# Patient Record
Sex: MALE | Race: WHITE | NOT HISPANIC OR LATINO | Employment: OTHER | URBAN - METROPOLITAN AREA
[De-identification: names, ages, dates, MRNs, and addresses within clinical notes are randomized per-mention and may not be internally consistent; named-entity substitution may affect disease eponyms.]

---

## 2022-11-07 ENCOUNTER — APPOINTMENT (OUTPATIENT)
Dept: GENERAL RADIOLOGY | Facility: HOSPITAL | Age: 82
End: 2022-11-07

## 2022-11-07 ENCOUNTER — HOSPITAL ENCOUNTER (EMERGENCY)
Facility: HOSPITAL | Age: 82
Discharge: HOME | End: 2022-11-07
Attending: STUDENT IN AN ORGANIZED HEALTH CARE EDUCATION/TRAINING PROGRAM | Admitting: STUDENT IN AN ORGANIZED HEALTH CARE EDUCATION/TRAINING PROGRAM

## 2022-11-07 ENCOUNTER — APPOINTMENT (OUTPATIENT)
Dept: CT IMAGING | Facility: HOSPITAL | Age: 82
End: 2022-11-07

## 2022-11-07 VITALS
DIASTOLIC BLOOD PRESSURE: 73 MMHG | HEART RATE: 74 BPM | RESPIRATION RATE: 18 BRPM | WEIGHT: 192.02 LBS | SYSTOLIC BLOOD PRESSURE: 146 MMHG | BODY MASS INDEX: 27.49 KG/M2 | TEMPERATURE: 98 F | OXYGEN SATURATION: 96 % | HEIGHT: 70 IN

## 2022-11-07 DIAGNOSIS — S62.605A CLOSED NONDISPLACED FRACTURE OF PHALANX OF LEFT RING FINGER, UNSPECIFIED PHALANX, INITIAL ENCOUNTER: ICD-10-CM

## 2022-11-07 DIAGNOSIS — S09.90XA INJURY OF HEAD, INITIAL ENCOUNTER: ICD-10-CM

## 2022-11-07 DIAGNOSIS — S63.259A DISLOCATION OF FINGER, INITIAL ENCOUNTER: ICD-10-CM
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
## 2022-11-07 PROCEDURE — 0 LIDOCAINE 1 % SOLUTION: Performed by: STUDENT IN AN ORGANIZED HEALTH CARE EDUCATION/TRAINING PROGRAM

## 2022-11-07 PROCEDURE — 70450 CT HEAD/BRAIN W/O DYE: CPT

## 2022-11-07 PROCEDURE — 73140 X-RAY EXAM OF FINGER(S): CPT

## 2022-11-07 PROCEDURE — 99282 EMERGENCY DEPT VISIT SF MDM: CPT

## 2022-11-07 RX ORDER — LIDOCAINE HYDROCHLORIDE 10 MG/ML
5 INJECTION, SOLUTION INFILTRATION; PERINEURAL ONCE
Status: COMPLETED | OUTPATIENT
Start: 2022-11-07 | End: 2022-11-07

## 2022-11-07 RX ADMIN — LIDOCAINE HYDROCHLORIDE 5 ML: 10 INJECTION, SOLUTION INFILTRATION; PERINEURAL at 17:12

## 2022-11-07 NOTE — ED PROVIDER NOTES
"Time: 4:12 PM EST    Chief Complaint: fall, head injury  History provided by: pt  History is limited by: N/A     History of Present Illness:  Patient is a 81 y.o. year old male who presents to the emergency department with fall and head injury. Pt states that about 4 hours ago he was going up concrete steps when he fell forward and hit his head in steps. Pt denies LOC. Pt endorses taking Plavix and is unsure about his last tetanus shot.      History provided by:  Patient   used: No        Similar Symptoms Previously: no  Recently seen: no      Patient Care Team  Primary Care Provider: Gonzalo Nugent MD    Past Medical History:     No Known Allergies  No past medical history on file.  No past surgical history on file.  No family history on file.    Home Medications:  Prior to Admission medications    Not on File        Social History:          Review of Systems:  Review of Systems   Constitutional: Negative for chills and fever.   HENT: Negative for congestion, rhinorrhea and sore throat.    Eyes: Negative for pain and visual disturbance.   Respiratory: Negative for apnea, cough, chest tightness and shortness of breath.    Cardiovascular: Negative for chest pain and palpitations.   Gastrointestinal: Negative for abdominal pain, diarrhea, nausea and vomiting.   Genitourinary: Negative for difficulty urinating and dysuria.   Musculoskeletal: Positive for arthralgias. Negative for joint swelling and myalgias.   Skin: Negative for color change.   Neurological: Negative for seizures, syncope and headaches.   Psychiatric/Behavioral: Negative.    All other systems reviewed and are negative.       Physical Exam:  /73   Pulse 74   Temp 98 °F (36.7 °C)   Resp 18   Ht 177.8 cm (70\")   Wt 87.1 kg (192 lb 0.3 oz)   SpO2 96%   BMI 27.55 kg/m²     Physical Exam  Vitals and nursing note reviewed.   Constitutional:       General: He is not in acute distress.     Appearance: Normal appearance. He is " not toxic-appearing.   HENT:      Head: Normocephalic. Abrasion (left forehead and no active bleeding) present.      Jaw: There is normal jaw occlusion.   Eyes:      General: Lids are normal.      Extraocular Movements: Extraocular movements intact.      Conjunctiva/sclera: Conjunctivae normal.      Pupils: Pupils are equal, round, and reactive to light.   Cardiovascular:      Rate and Rhythm: Normal rate and regular rhythm.      Pulses: Normal pulses.      Heart sounds: Normal heart sounds.   Pulmonary:      Effort: Pulmonary effort is normal. No respiratory distress.      Breath sounds: Normal breath sounds. No wheezing or rhonchi.   Abdominal:      General: Abdomen is flat.      Palpations: Abdomen is soft.      Tenderness: There is no abdominal tenderness. There is no guarding or rebound.   Musculoskeletal:         General: Normal range of motion.      Cervical back: Normal range of motion and neck supple.      Right lower leg: No edema.      Left lower leg: No edema.      Comments: Left finger is deformed and bruised and still warm  No pain in any extremities    Skin:     General: Skin is warm and dry.   Neurological:      Mental Status: He is alert and oriented to person, place, and time. Mental status is at baseline.   Psychiatric:         Mood and Affect: Mood normal.                Medications in the Emergency Department:  Medications   lidocaine (XYLOCAINE) 1 % injection 5 mL (5 mL Injection Given by Other 11/7/22 1712)        Labs  Lab Results (last 24 hours)     ** No results found for the last 24 hours. **           Imaging:  CT Head Without Contrast    Result Date: 11/7/2022  PROCEDURE: CT HEAD WO CONTRAST  COMPARISON:  None INDICATIONS: Facial trauma, blunt,fall,frontal bone swelling  PROTOCOL:   Standard imaging protocol performed    RADIATION:   DLP: 1082.2mGy*cm   MA and/or KV was adjusted to minimize radiation dose.     TECHNIQUE: After obtaining the patient's consent, CT images were obtained  without non-ionic intravenous contrast material.  FINDINGS:  No focal brain lesion, mass or intracranial hemorrhage is seen.  The ventricles are normal in size and configuration given the diffuse age-appropriate cerebral and cerebellar atrophy.  There is prominent left forehead soft tissue swelling.  Gas is noted in the soft tissues consistent with a laceration.  No fractures identified.        1. Left forehead soft tissue swelling/laceration 2. No calvarial fracture or intracranial hemorrhage     Enio Herron M.D.       Electronically Signed and Approved By: Enio Herron M.D. on 11/07/2022 at 15:48             XR Finger 2+ View Left    Result Date: 11/7/2022  PROCEDURE: XR FINGER 2+ VW LEFT  COMPARISON: [facility], , XR FINGER 2+ VW LEFT, 11/07/2022, 15:46.  INDICATIONS: dislocation 4th digit reduced  FINDINGS:  The 4th PIP joint has been successfully reduced.  There is a small chip fracture measuring 0.2 cm x 0.1 cm off the ulnar aspect of the distal para-articular surface of the 4th proximal phalanx.  Fourth digit soft tissue swelling is noted.  No other fracture is identified.        1. Successful reduction of the 4th PIP joint. 2. 0.2 cm x 0.1 cm chip fracture off the distal/ulnar para-articular surface of the 4th proximal phalanx      Enio Herron M.D.       Electronically Signed and Approved By: Enio Herron M.D. on 11/07/2022 at 18:37             XR Finger 2+ View Left    Result Date: 11/7/2022  PROCEDURE: XR FINGER 2+ VW LEFT  COMPARISON: None  INDICATIONS: LEFT 4TH DIGIT PAIN AFTER FALL TODAY  FINDINGS:  There is dorsal dislocation of the 4th PIP joint.  There is moderate apex radial angulation.  No definite fracture is seen.  Osseous structures otherwise appear unremarkable.  Severe atherosclerotic calcification is noted in the distal forearm/wrist.  Moderate osteoarthritic changes of the 1st carpal/metacarpal joint are noted.        1. Dorsal dislocation of the 4th PIP joint       Enio Herron M.D.       Electronically Signed and Approved By: Enio Herron M.D. on 11/07/2022 at 16:17               Procedures:  FX Dislocation    Date/Time: 11/7/2022 6:47 PM  Performed by: Tana Cazares MD  Authorized by: Tana Cazares MD     Consent:     Consent obtained:  Verbal    Consent given by:  Patient    Risks discussed:  Pain  Universal protocol:     Patient identity confirmed:  Verbally with patient  Injury:     Injury location:  Finger    Finger injury location:  L ring finger    MCP joint involved: no      IP joint involved: yes    Pre-procedure details:     Distal perfusion: distal pulses strong    Procedure details:     Immobilization:  Splint  Post-procedure details:     Distal neurologic exam:  Normal    Distal perfusion: distal pulses strong      Procedure completion:  Tolerated    Fracture management: I provided definitive fracture management    Comments:      The fourth digit of the left hand was reduced.  5 cc of 2% lidocaine was inserted at the base of the fourth digit using chlorhexidine.  This created an appropriate block and the finger was reduced.        Progress                            Medical Decision Making:  MDM  Number of Diagnoses or Management Options  Closed nondisplaced fracture of phalanx of left ring finger, unspecified phalanx, initial encounter  Dislocation of finger, initial encounter  Fall, initial encounter  Injury of head, initial encounter  Diagnosis management comments: CT of the head was normal with no intracranial event.  The fourth digit of the left hand was dislocated.  I did a finger block and reduced it.  There seems to be a chip fracture in the proximal phalanx.  Patient has improved range of that finger.  He was placed in a splint and given follow-up with orthopedics and return precautions.       Amount and/or Complexity of Data Reviewed  Clinical lab tests: reviewed  Tests in the radiology section of CPT®: reviewed  Tests in the medicine section of  CPT®: reviewed  Independent visualization of images, tracings, or specimens: yes         Final diagnoses:   Fall, initial encounter   Injury of head, initial encounter   Dislocation of finger, initial encounter   Closed nondisplaced fracture of phalanx of left ring finger, unspecified phalanx, initial encounter        Disposition:  ED Disposition     ED Disposition   Discharge    Condition   Stable    Comment   --             This medical record created using voice recognition software.        Documentation assistance provided by Rosa Crouch acting as scribe for Tana Cazares MD. Information recorded by the scribe was done at my direction and has been verified and validated by me.          Rosa Crouch  11/07/22 7530       Tana Cazares MD  11/07/22 6220

## 2022-11-07 NOTE — DISCHARGE INSTRUCTIONS
If you have confusion, vomiting, or other concerning symptoms, return to the emergency department.  Please follow-up with the orthopedic doctors.  You have a small break in that joint space after it was dislocated.

## 2023-04-19 ENCOUNTER — APPOINTMENT (OUTPATIENT)
Dept: CARDIOLOGY | Facility: HOSPITAL | Age: 83
End: 2023-04-19
Payer: MEDICARE

## 2023-04-19 ENCOUNTER — HOSPITAL ENCOUNTER (INPATIENT)
Facility: HOSPITAL | Age: 83
LOS: 7 days | Discharge: HOME | End: 2023-04-26
Attending: EMERGENCY MEDICINE | Admitting: FAMILY MEDICINE
Payer: MEDICARE

## 2023-04-19 ENCOUNTER — APPOINTMENT (OUTPATIENT)
Dept: CT IMAGING | Facility: HOSPITAL | Age: 83
End: 2023-04-19
Payer: MEDICARE

## 2023-04-19 ENCOUNTER — APPOINTMENT (OUTPATIENT)
Dept: GENERAL RADIOLOGY | Facility: HOSPITAL | Age: 83
End: 2023-04-19
Payer: MEDICARE

## 2023-04-19 DIAGNOSIS — Z78.9 DECREASED ACTIVITIES OF DAILY LIVING (ADL): ICD-10-CM

## 2023-04-19 DIAGNOSIS — A41.9 SEPTIC SHOCK: ICD-10-CM

## 2023-04-19 DIAGNOSIS — R26.2 DIFFICULTY IN WALKING: ICD-10-CM

## 2023-04-19 DIAGNOSIS — J18.9 PNEUMONIA OF BOTH LOWER LOBES DUE TO INFECTIOUS ORGANISM: ICD-10-CM

## 2023-04-19 DIAGNOSIS — R09.02 HYPOXIA: ICD-10-CM

## 2023-04-19 DIAGNOSIS — R65.21 SEPTIC SHOCK: ICD-10-CM

## 2023-04-19 DIAGNOSIS — E13.10 DIABETIC KETOACIDOSIS WITHOUT COMA ASSOCIATED WITH OTHER SPECIFIED DIABETES MELLITUS: ICD-10-CM

## 2023-04-19 DIAGNOSIS — J96.01 ACUTE RESPIRATORY FAILURE WITH HYPOXIA: ICD-10-CM

## 2023-04-19 DIAGNOSIS — I48.91 ATRIAL FIBRILLATION WITH RAPID VENTRICULAR RESPONSE: Primary | ICD-10-CM

## 2023-04-19 DIAGNOSIS — R13.12 DYSPHAGIA, OROPHARYNGEAL: ICD-10-CM

## 2023-04-19 LAB
ACETONE BLD QL: ABNORMAL
ALBUMIN SERPL-MCNC: 3.3 G/DL (ref 3.5–5.2)
ALBUMIN/GLOB SERPL: 0.9 G/DL
ALP SERPL-CCNC: 68 U/L (ref 39–117)
ALT SERPL W P-5'-P-CCNC: 11 U/L (ref 1–41)
ANION GAP SERPL CALCULATED.3IONS-SCNC: 10.2 MMOL/L (ref 5–15)
ANION GAP SERPL CALCULATED.3IONS-SCNC: 15.1 MMOL/L (ref 5–15)
ANION GAP SERPL CALCULATED.3IONS-SCNC: 16.6 MMOL/L (ref 5–15)
ANION GAP SERPL CALCULATED.3IONS-SCNC: 23.6 MMOL/L (ref 5–15)
ARTERIAL PATENCY WRIST A: POSITIVE
AST SERPL-CCNC: 10 U/L (ref 1–40)
BACTERIA UR QL AUTO: ABNORMAL /HPF
BACTERIA UR QL AUTO: ABNORMAL /HPF
BASE EXCESS BLDA CALC-SCNC: -11.5 MMOL/L (ref -2–2)
BASOPHILS # BLD AUTO: 0.03 10*3/MM3 (ref 0–0.2)
BASOPHILS # BLD AUTO: 0.04 10*3/MM3 (ref 0–0.2)
BASOPHILS # BLD AUTO: 0.05 10*3/MM3 (ref 0–0.2)
BASOPHILS NFR BLD AUTO: 0.2 % (ref 0–1.5)
BASOPHILS NFR BLD AUTO: 0.2 % (ref 0–1.5)
BASOPHILS NFR BLD AUTO: 0.3 % (ref 0–1.5)
BDY SITE: ABNORMAL
BILIRUB SERPL-MCNC: 1.3 MG/DL (ref 0–1.2)
BILIRUB UR QL STRIP: NEGATIVE
BILIRUB UR QL STRIP: NEGATIVE
BUN SERPL-MCNC: 30 MG/DL (ref 8–23)
BUN SERPL-MCNC: 34 MG/DL (ref 8–23)
BUN SERPL-MCNC: 35 MG/DL (ref 8–23)
BUN SERPL-MCNC: 39 MG/DL (ref 8–23)
BUN/CREAT SERPL: 26.9 (ref 7–25)
BUN/CREAT SERPL: 28.9 (ref 7–25)
BUN/CREAT SERPL: 30.6 (ref 7–25)
BUN/CREAT SERPL: 31.9 (ref 7–25)
CA-I BLDA-SCNC: 1.34 MMOL/L (ref 1.13–1.32)
CALCIUM SPEC-SCNC: 8.7 MG/DL (ref 8.6–10.5)
CALCIUM SPEC-SCNC: 8.9 MG/DL (ref 8.6–10.5)
CALCIUM SPEC-SCNC: 9 MG/DL (ref 8.6–10.5)
CALCIUM SPEC-SCNC: 9.7 MG/DL (ref 8.6–10.5)
CHLORIDE BLDA-SCNC: 105 MMOL/L (ref 98–106)
CHLORIDE SERPL-SCNC: 100 MMOL/L (ref 98–107)
CHLORIDE SERPL-SCNC: 111 MMOL/L (ref 98–107)
CHLORIDE SERPL-SCNC: 113 MMOL/L (ref 98–107)
CHLORIDE SERPL-SCNC: 117 MMOL/L (ref 98–107)
CLARITY UR: ABNORMAL
CLARITY UR: CLEAR
CO2 SERPL-SCNC: 13.4 MMOL/L (ref 22–29)
CO2 SERPL-SCNC: 14.9 MMOL/L (ref 22–29)
CO2 SERPL-SCNC: 15.4 MMOL/L (ref 22–29)
CO2 SERPL-SCNC: 16.8 MMOL/L (ref 22–29)
COHGB MFR BLD: 1.2 % (ref 0–1.5)
COLOR UR: YELLOW
COLOR UR: YELLOW
CREAT SERPL-MCNC: 0.94 MG/DL (ref 0.76–1.27)
CREAT SERPL-MCNC: 1.11 MG/DL (ref 0.76–1.27)
CREAT SERPL-MCNC: 1.21 MG/DL (ref 0.76–1.27)
CREAT SERPL-MCNC: 1.45 MG/DL (ref 0.76–1.27)
D-LACTATE SERPL-SCNC: 2 MMOL/L (ref 0.5–2)
D-LACTATE SERPL-SCNC: 3.1 MMOL/L (ref 0.5–2)
D-LACTATE SERPL-SCNC: 3.3 MMOL/L (ref 0.5–2)
D-LACTATE SERPL-SCNC: 5.1 MMOL/L (ref 0.5–2)
DEPRECATED RDW RBC AUTO: 47.9 FL (ref 37–54)
DEPRECATED RDW RBC AUTO: 48.5 FL (ref 37–54)
DEPRECATED RDW RBC AUTO: 48.9 FL (ref 37–54)
EGFRCR SERPLBLD CKD-EPI 2021: 48.4 ML/MIN/1.73
EGFRCR SERPLBLD CKD-EPI 2021: 60.2 ML/MIN/1.73
EGFRCR SERPLBLD CKD-EPI 2021: 66.7 ML/MIN/1.73
EGFRCR SERPLBLD CKD-EPI 2021: 81.4 ML/MIN/1.73
EOSINOPHIL # BLD AUTO: 0.01 10*3/MM3 (ref 0–0.4)
EOSINOPHIL # BLD AUTO: 0.12 10*3/MM3 (ref 0–0.4)
EOSINOPHIL # BLD AUTO: 0.14 10*3/MM3 (ref 0–0.4)
EOSINOPHIL NFR BLD AUTO: 0.1 % (ref 0.3–6.2)
EOSINOPHIL NFR BLD AUTO: 0.7 % (ref 0.3–6.2)
EOSINOPHIL NFR BLD AUTO: 0.8 % (ref 0.3–6.2)
ERYTHROCYTE [DISTWIDTH] IN BLOOD BY AUTOMATED COUNT: 13.8 % (ref 12.3–15.4)
ERYTHROCYTE [DISTWIDTH] IN BLOOD BY AUTOMATED COUNT: 13.8 % (ref 12.3–15.4)
ERYTHROCYTE [DISTWIDTH] IN BLOOD BY AUTOMATED COUNT: 14.1 % (ref 12.3–15.4)
FHHB: 7.4 % (ref 0–5)
GAS FLOW AIRWAY: 15 LPM
GEN 5 2HR TROPONIN T REFLEX: 91 NG/L
GLOBULIN UR ELPH-MCNC: 3.6 GM/DL
GLUCOSE BLDA-MCNC: 594 MG/DL (ref 70–99)
GLUCOSE BLDC GLUCOMTR-MCNC: 129 MG/DL (ref 70–99)
GLUCOSE BLDC GLUCOMTR-MCNC: 133 MG/DL (ref 70–99)
GLUCOSE BLDC GLUCOMTR-MCNC: 134 MG/DL (ref 70–99)
GLUCOSE BLDC GLUCOMTR-MCNC: 140 MG/DL (ref 70–99)
GLUCOSE BLDC GLUCOMTR-MCNC: 169 MG/DL (ref 70–99)
GLUCOSE BLDC GLUCOMTR-MCNC: 188 MG/DL (ref 70–99)
GLUCOSE BLDC GLUCOMTR-MCNC: 199 MG/DL (ref 70–99)
GLUCOSE BLDC GLUCOMTR-MCNC: 200 MG/DL (ref 70–99)
GLUCOSE BLDC GLUCOMTR-MCNC: 221 MG/DL (ref 70–99)
GLUCOSE BLDC GLUCOMTR-MCNC: 256 MG/DL (ref 70–99)
GLUCOSE BLDC GLUCOMTR-MCNC: 282 MG/DL (ref 70–99)
GLUCOSE BLDC GLUCOMTR-MCNC: 308 MG/DL (ref 70–99)
GLUCOSE BLDC GLUCOMTR-MCNC: 390 MG/DL (ref 70–99)
GLUCOSE BLDC GLUCOMTR-MCNC: 464 MG/DL (ref 70–99)
GLUCOSE BLDC GLUCOMTR-MCNC: 547 MG/DL (ref 70–99)
GLUCOSE SERPL-MCNC: 144 MG/DL (ref 65–99)
GLUCOSE SERPL-MCNC: 280 MG/DL (ref 65–99)
GLUCOSE SERPL-MCNC: 351 MG/DL (ref 65–99)
GLUCOSE SERPL-MCNC: 606 MG/DL (ref 65–99)
GLUCOSE UR STRIP-MCNC: ABNORMAL MG/DL
GLUCOSE UR STRIP-MCNC: ABNORMAL MG/DL
HBA1C MFR BLD: 8.9 % (ref 4.8–5.6)
HCO3 BLDA-SCNC: 13.1 MMOL/L (ref 22–26)
HCT VFR BLD AUTO: 30.4 % (ref 37.5–51)
HCT VFR BLD AUTO: 38.4 % (ref 37.5–51)
HCT VFR BLD AUTO: 42.7 % (ref 37.5–51)
HGB BLD-MCNC: 10.6 G/DL (ref 13–17.7)
HGB BLD-MCNC: 12.9 G/DL (ref 13–17.7)
HGB BLD-MCNC: 14.2 G/DL (ref 13–17.7)
HGB BLDA-MCNC: 14.7 G/DL (ref 13.8–16.4)
HGB UR QL STRIP.AUTO: ABNORMAL
HGB UR QL STRIP.AUTO: ABNORMAL
HOLD SPECIMEN: NORMAL
HOLD SPECIMEN: NORMAL
HYALINE CASTS UR QL AUTO: ABNORMAL /LPF
HYALINE CASTS UR QL AUTO: ABNORMAL /LPF
IMM GRANULOCYTES # BLD AUTO: 0.04 10*3/MM3 (ref 0–0.05)
IMM GRANULOCYTES # BLD AUTO: 0.04 10*3/MM3 (ref 0–0.05)
IMM GRANULOCYTES # BLD AUTO: 0.07 10*3/MM3 (ref 0–0.05)
IMM GRANULOCYTES NFR BLD AUTO: 0.2 % (ref 0–0.5)
IMM GRANULOCYTES NFR BLD AUTO: 0.2 % (ref 0–0.5)
IMM GRANULOCYTES NFR BLD AUTO: 0.4 % (ref 0–0.5)
INHALED O2 CONCENTRATION: 100 %
INR PPP: 1.19 (ref 0.86–1.15)
KETONES UR QL STRIP: ABNORMAL
KETONES UR QL STRIP: NEGATIVE
L PNEUMO1 AG UR QL IA: NEGATIVE
LACTATE BLDA-SCNC: 5.25 MMOL/L (ref 0.5–2)
LEUKOCYTE ESTERASE UR QL STRIP.AUTO: NEGATIVE
LEUKOCYTE ESTERASE UR QL STRIP.AUTO: NEGATIVE
LYMPHOCYTES # BLD AUTO: 0.41 10*3/MM3 (ref 0.7–3.1)
LYMPHOCYTES # BLD AUTO: 0.56 10*3/MM3 (ref 0.7–3.1)
LYMPHOCYTES # BLD AUTO: 1.01 10*3/MM3 (ref 0.7–3.1)
LYMPHOCYTES NFR BLD AUTO: 2.3 % (ref 19.6–45.3)
LYMPHOCYTES NFR BLD AUTO: 3.5 % (ref 19.6–45.3)
LYMPHOCYTES NFR BLD AUTO: 6 % (ref 19.6–45.3)
MAGNESIUM SERPL-MCNC: 1.9 MG/DL (ref 1.6–2.4)
MAGNESIUM SERPL-MCNC: 2.1 MG/DL (ref 1.6–2.4)
MAGNESIUM SERPL-MCNC: 2.4 MG/DL (ref 1.6–2.4)
MCH RBC QN AUTO: 31.7 PG (ref 26.6–33)
MCH RBC QN AUTO: 31.9 PG (ref 26.6–33)
MCH RBC QN AUTO: 32.2 PG (ref 26.6–33)
MCHC RBC AUTO-ENTMCNC: 33.3 G/DL (ref 31.5–35.7)
MCHC RBC AUTO-ENTMCNC: 33.6 G/DL (ref 31.5–35.7)
MCHC RBC AUTO-ENTMCNC: 34.9 G/DL (ref 31.5–35.7)
MCV RBC AUTO: 92.4 FL (ref 79–97)
MCV RBC AUTO: 95 FL (ref 79–97)
MCV RBC AUTO: 95.3 FL (ref 79–97)
METHGB BLD QL: 0 % (ref 0–1.5)
MODALITY: ABNORMAL
MONOCYTES # BLD AUTO: 0.5 10*3/MM3 (ref 0.1–0.9)
MONOCYTES # BLD AUTO: 0.56 10*3/MM3 (ref 0.1–0.9)
MONOCYTES # BLD AUTO: 0.99 10*3/MM3 (ref 0.1–0.9)
MONOCYTES NFR BLD AUTO: 3.1 % (ref 5–12)
MONOCYTES NFR BLD AUTO: 3.3 % (ref 5–12)
MONOCYTES NFR BLD AUTO: 5.5 % (ref 5–12)
MRSA DNA SPEC QL NAA+PROBE: NORMAL
NEUTROPHILS NFR BLD AUTO: 14.91 10*3/MM3 (ref 1.7–7)
NEUTROPHILS NFR BLD AUTO: 14.94 10*3/MM3 (ref 1.7–7)
NEUTROPHILS NFR BLD AUTO: 16.24 10*3/MM3 (ref 1.7–7)
NEUTROPHILS NFR BLD AUTO: 89.2 % (ref 42.7–76)
NEUTROPHILS NFR BLD AUTO: 91.1 % (ref 42.7–76)
NEUTROPHILS NFR BLD AUTO: 92.9 % (ref 42.7–76)
NITRITE UR QL STRIP: NEGATIVE
NITRITE UR QL STRIP: NEGATIVE
NRBC BLD AUTO-RTO: 0 /100 WBC (ref 0–0.2)
NRBC BLD AUTO-RTO: 0 /100 WBC (ref 0–0.2)
NRBC BLD AUTO-RTO: 0.1 /100 WBC (ref 0–0.2)
NT-PROBNP SERPL-MCNC: 2283 PG/ML (ref 0–1800)
OSMOLALITY SERPL: 328 MOSM/KG (ref 280–301)
OXYHGB MFR BLDV: 91.4 % (ref 94–99)
PCO2 BLDA: 26.7 MM HG (ref 35–45)
PH BLDA: 7.31 PH UNITS (ref 7.35–7.45)
PH UR STRIP.AUTO: <=5 [PH] (ref 5–8)
PH UR STRIP.AUTO: <=5 [PH] (ref 5–8)
PHOSPHATE SERPL-MCNC: 0.9 MG/DL (ref 2.5–4.5)
PHOSPHATE SERPL-MCNC: 1.4 MG/DL (ref 2.5–4.5)
PHOSPHATE SERPL-MCNC: 1.5 MG/DL (ref 2.5–4.5)
PHOSPHATE SERPL-MCNC: 2 MG/DL (ref 2.5–4.5)
PHOSPHATE SERPL-MCNC: 2.1 MG/DL (ref 2.5–4.5)
PLATELET # BLD AUTO: 215 10*3/MM3 (ref 140–450)
PLATELET # BLD AUTO: 299 10*3/MM3 (ref 140–450)
PLATELET # BLD AUTO: 330 10*3/MM3 (ref 140–450)
PMV BLD AUTO: 10.2 FL (ref 6–12)
PMV BLD AUTO: 10.2 FL (ref 6–12)
PMV BLD AUTO: 10.6 FL (ref 6–12)
PO2 BLD: 73 MM[HG] (ref 0–500)
PO2 BLDA: 72.9 MM HG (ref 80–100)
POTASSIUM BLDA-SCNC: 3.29 MMOL/L (ref 3.5–5)
POTASSIUM SERPL-SCNC: 2.9 MMOL/L (ref 3.5–5.2)
POTASSIUM SERPL-SCNC: 3 MMOL/L (ref 3.5–5.2)
POTASSIUM SERPL-SCNC: 3.2 MMOL/L (ref 3.5–5.2)
POTASSIUM SERPL-SCNC: 4.5 MMOL/L (ref 3.5–5.2)
PROCALCITONIN SERPL-MCNC: 10.64 NG/ML (ref 0–0.25)
PROT SERPL-MCNC: 6.9 G/DL (ref 6–8.5)
PROT UR QL STRIP: ABNORMAL
PROT UR QL STRIP: ABNORMAL
PROTHROMBIN TIME: 15.1 SECONDS (ref 11.8–14.9)
RBC # BLD AUTO: 3.29 10*6/MM3 (ref 4.14–5.8)
RBC # BLD AUTO: 4.04 10*6/MM3 (ref 4.14–5.8)
RBC # BLD AUTO: 4.48 10*6/MM3 (ref 4.14–5.8)
RBC # UR STRIP: ABNORMAL /HPF
RBC # UR STRIP: ABNORMAL /HPF
REF LAB TEST METHOD: ABNORMAL
REF LAB TEST METHOD: ABNORMAL
S PNEUM AG SPEC QL LA: POSITIVE
SAO2 % BLDCOA: 92.5 % (ref 95–99)
SODIUM BLDA-SCNC: 139.3 MMOL/L (ref 136–146)
SODIUM SERPL-SCNC: 137 MMOL/L (ref 136–145)
SODIUM SERPL-SCNC: 143 MMOL/L (ref 136–145)
SODIUM SERPL-SCNC: 143 MMOL/L (ref 136–145)
SODIUM SERPL-SCNC: 144 MMOL/L (ref 136–145)
SP GR UR STRIP: >1.03 (ref 1–1.03)
SP GR UR STRIP: >=1.03 (ref 1–1.03)
SQUAMOUS #/AREA URNS HPF: ABNORMAL /HPF
SQUAMOUS #/AREA URNS HPF: ABNORMAL /HPF
T3FREE SERPL-MCNC: 1.91 PG/ML (ref 2–4.4)
T4 FREE SERPL-MCNC: 2.15 NG/DL (ref 0.93–1.7)
T4 FREE SERPL-MCNC: 2.38 NG/DL (ref 0.93–1.7)
TRANS CELLS #/AREA URNS HPF: ABNORMAL /HPF
TROPONIN T DELTA: 2 NG/L
TROPONIN T SERPL HS-MCNC: 89 NG/L
TROPONIN T SERPL HS-MCNC: 99 NG/L
TSH SERPL DL<=0.05 MIU/L-ACNC: 0.04 UIU/ML (ref 0.27–4.2)
URATE CRY URNS QL MICRO: ABNORMAL /HPF
UROBILINOGEN UR QL STRIP: ABNORMAL
UROBILINOGEN UR QL STRIP: ABNORMAL
VANCOMYCIN SERPL-MCNC: 11.8 MCG/ML (ref 5–40)
WBC # UR STRIP: ABNORMAL /HPF
WBC # UR STRIP: ABNORMAL /HPF
WBC NRBC COR # BLD: 16.05 10*3/MM3 (ref 3.4–10.8)
WBC NRBC COR # BLD: 16.77 10*3/MM3 (ref 3.4–10.8)
WBC NRBC COR # BLD: 17.84 10*3/MM3 (ref 3.4–10.8)
WHOLE BLOOD HOLD COAG: NORMAL
WHOLE BLOOD HOLD SPECIMEN: NORMAL

## 2023-04-19 PROCEDURE — 94799 UNLISTED PULMONARY SVC/PX: CPT

## 2023-04-19 PROCEDURE — 84484 ASSAY OF TROPONIN QUANT: CPT | Performed by: INTERNAL MEDICINE

## 2023-04-19 PROCEDURE — 84100 ASSAY OF PHOSPHORUS: CPT | Performed by: EMERGENCY MEDICINE

## 2023-04-19 PROCEDURE — 87641 MR-STAPH DNA AMP PROBE: CPT | Performed by: INTERNAL MEDICINE

## 2023-04-19 PROCEDURE — 25010000002 CEFEPIME PER 500 MG: Performed by: INTERNAL MEDICINE

## 2023-04-19 PROCEDURE — 83735 ASSAY OF MAGNESIUM: CPT | Performed by: INTERNAL MEDICINE

## 2023-04-19 PROCEDURE — 83930 ASSAY OF BLOOD OSMOLALITY: CPT | Performed by: EMERGENCY MEDICINE

## 2023-04-19 PROCEDURE — 5A2204Z RESTORATION OF CARDIAC RHYTHM, SINGLE: ICD-10-PCS | Performed by: EMERGENCY MEDICINE

## 2023-04-19 PROCEDURE — 02HV33Z INSERTION OF INFUSION DEVICE INTO SUPERIOR VENA CAVA, PERCUTANEOUS APPROACH: ICD-10-PCS | Performed by: EMERGENCY MEDICINE

## 2023-04-19 PROCEDURE — 25010000002 FENTANYL CITRATE (PF) 50 MCG/ML SOLUTION

## 2023-04-19 PROCEDURE — 85025 COMPLETE CBC W/AUTO DIFF WBC: CPT | Performed by: INTERNAL MEDICINE

## 2023-04-19 PROCEDURE — 25010000002 VANCOMYCIN 5 G RECONSTITUTED SOLUTION: Performed by: INTERNAL MEDICINE

## 2023-04-19 PROCEDURE — 87899 AGENT NOS ASSAY W/OPTIC: CPT | Performed by: INTERNAL MEDICINE

## 2023-04-19 PROCEDURE — 25010000002 LORAZEPAM PER 2 MG: Performed by: NURSE PRACTITIONER

## 2023-04-19 PROCEDURE — 82805 BLOOD GASES W/O2 SATURATION: CPT | Performed by: EMERGENCY MEDICINE

## 2023-04-19 PROCEDURE — 84100 ASSAY OF PHOSPHORUS: CPT | Performed by: INTERNAL MEDICINE

## 2023-04-19 PROCEDURE — C1751 CATH, INF, PER/CENT/MIDLINE: HCPCS

## 2023-04-19 PROCEDURE — 87449 NOS EACH ORGANISM AG IA: CPT | Performed by: INTERNAL MEDICINE

## 2023-04-19 PROCEDURE — 25010000002 AMIODARONE IN DEXTROSE 5% 150-4.21 MG/100ML-% SOLUTION: Performed by: INTERNAL MEDICINE

## 2023-04-19 PROCEDURE — 94660 CPAP INITIATION&MGMT: CPT

## 2023-04-19 PROCEDURE — 36600 WITHDRAWAL OF ARTERIAL BLOOD: CPT | Performed by: EMERGENCY MEDICINE

## 2023-04-19 PROCEDURE — 83050 HGB METHEMOGLOBIN QUAN: CPT | Performed by: EMERGENCY MEDICINE

## 2023-04-19 PROCEDURE — 99291 CRITICAL CARE FIRST HOUR: CPT | Performed by: INTERNAL MEDICINE

## 2023-04-19 PROCEDURE — 81001 URINALYSIS AUTO W/SCOPE: CPT | Performed by: INTERNAL MEDICINE

## 2023-04-19 PROCEDURE — 83036 HEMOGLOBIN GLYCOSYLATED A1C: CPT | Performed by: EMERGENCY MEDICINE

## 2023-04-19 PROCEDURE — 82962 GLUCOSE BLOOD TEST: CPT

## 2023-04-19 PROCEDURE — 99291 CRITICAL CARE FIRST HOUR: CPT

## 2023-04-19 PROCEDURE — 83735 ASSAY OF MAGNESIUM: CPT | Performed by: EMERGENCY MEDICINE

## 2023-04-19 PROCEDURE — 84443 ASSAY THYROID STIM HORMONE: CPT | Performed by: INTERNAL MEDICINE

## 2023-04-19 PROCEDURE — 94761 N-INVAS EAR/PLS OXIMETRY MLT: CPT

## 2023-04-19 PROCEDURE — 82009 KETONE BODYS QUAL: CPT | Performed by: EMERGENCY MEDICINE

## 2023-04-19 PROCEDURE — 80202 ASSAY OF VANCOMYCIN: CPT | Performed by: INTERNAL MEDICINE

## 2023-04-19 PROCEDURE — 25010000002 AMIODARONE IN DEXTROSE 5% 150-4.21 MG/100ML-% SOLUTION: Performed by: EMERGENCY MEDICINE

## 2023-04-19 PROCEDURE — 85025 COMPLETE CBC W/AUTO DIFF WBC: CPT | Performed by: EMERGENCY MEDICINE

## 2023-04-19 PROCEDURE — 84145 PROCALCITONIN (PCT): CPT | Performed by: INTERNAL MEDICINE

## 2023-04-19 PROCEDURE — 92960 CARDIOVERSION ELECTRIC EXT: CPT

## 2023-04-19 PROCEDURE — 80053 COMPREHEN METABOLIC PANEL: CPT | Performed by: EMERGENCY MEDICINE

## 2023-04-19 PROCEDURE — 93306 TTE W/DOPPLER COMPLETE: CPT

## 2023-04-19 PROCEDURE — 81001 URINALYSIS AUTO W/SCOPE: CPT | Performed by: EMERGENCY MEDICINE

## 2023-04-19 PROCEDURE — 84484 ASSAY OF TROPONIN QUANT: CPT | Performed by: EMERGENCY MEDICINE

## 2023-04-19 PROCEDURE — 99292 CRITICAL CARE ADDL 30 MIN: CPT

## 2023-04-19 PROCEDURE — 84481 FREE ASSAY (FT-3): CPT | Performed by: INTERNAL MEDICINE

## 2023-04-19 PROCEDURE — 94640 AIRWAY INHALATION TREATMENT: CPT

## 2023-04-19 PROCEDURE — 87040 BLOOD CULTURE FOR BACTERIA: CPT | Performed by: EMERGENCY MEDICINE

## 2023-04-19 PROCEDURE — 25010000002 ADENOSINE PER 6 MG

## 2023-04-19 PROCEDURE — 71045 X-RAY EXAM CHEST 1 VIEW: CPT

## 2023-04-19 PROCEDURE — 93306 TTE W/DOPPLER COMPLETE: CPT | Performed by: INTERNAL MEDICINE

## 2023-04-19 PROCEDURE — 99222 1ST HOSP IP/OBS MODERATE 55: CPT | Performed by: INTERNAL MEDICINE

## 2023-04-19 PROCEDURE — 25010000002 PIPERACILLIN SOD-TAZOBACTAM PER 1 G: Performed by: EMERGENCY MEDICINE

## 2023-04-19 PROCEDURE — 83880 ASSAY OF NATRIURETIC PEPTIDE: CPT | Performed by: EMERGENCY MEDICINE

## 2023-04-19 PROCEDURE — 0 POTASSIUM CHLORIDE 10 MEQ/100ML SOLUTION: Performed by: EMERGENCY MEDICINE

## 2023-04-19 PROCEDURE — 84439 ASSAY OF FREE THYROXINE: CPT | Performed by: INTERNAL MEDICINE

## 2023-04-19 PROCEDURE — 93005 ELECTROCARDIOGRAM TRACING: CPT | Performed by: EMERGENCY MEDICINE

## 2023-04-19 PROCEDURE — 25010000002 AMIODARONE IN DEXTROSE 5% 360-4.14 MG/200ML-% SOLUTION: Performed by: INTERNAL MEDICINE

## 2023-04-19 PROCEDURE — 36415 COLL VENOUS BLD VENIPUNCTURE: CPT

## 2023-04-19 PROCEDURE — 82375 ASSAY CARBOXYHB QUANT: CPT | Performed by: EMERGENCY MEDICINE

## 2023-04-19 PROCEDURE — 71260 CT THORAX DX C+: CPT

## 2023-04-19 PROCEDURE — 83605 ASSAY OF LACTIC ACID: CPT | Performed by: EMERGENCY MEDICINE

## 2023-04-19 PROCEDURE — 25510000001 IOPAMIDOL PER 1 ML: Performed by: EMERGENCY MEDICINE

## 2023-04-19 PROCEDURE — 85610 PROTHROMBIN TIME: CPT | Performed by: INTERNAL MEDICINE

## 2023-04-19 PROCEDURE — 0 POTASSIUM CHLORIDE 10 MEQ/100ML SOLUTION: Performed by: INTERNAL MEDICINE

## 2023-04-19 RX ORDER — MONTELUKAST SODIUM 10 MG/1
10 TABLET ORAL DAILY
COMMUNITY
Start: 2023-03-21

## 2023-04-19 RX ORDER — ONDANSETRON 4 MG/1
4 TABLET, FILM COATED ORAL EVERY 6 HOURS PRN
Status: DISCONTINUED | OUTPATIENT
Start: 2023-04-19 | End: 2023-04-26 | Stop reason: HOSPADM

## 2023-04-19 RX ORDER — ATENOLOL 25 MG/1
25 TABLET ORAL DAILY
COMMUNITY
Start: 2023-04-07 | End: 2023-04-26 | Stop reason: HOSPADM

## 2023-04-19 RX ORDER — SODIUM CHLORIDE AND POTASSIUM CHLORIDE 150; 450 MG/100ML; MG/100ML
250 INJECTION, SOLUTION INTRAVENOUS CONTINUOUS PRN
Status: DISCONTINUED | OUTPATIENT
Start: 2023-04-19 | End: 2023-04-21

## 2023-04-19 RX ORDER — TIZANIDINE 2 MG/1
2 TABLET ORAL EVERY 6 HOURS PRN
COMMUNITY
Start: 2023-03-29

## 2023-04-19 RX ORDER — FENTANYL CITRATE 50 UG/ML
100 INJECTION, SOLUTION INTRAMUSCULAR; INTRAVENOUS ONCE
Status: COMPLETED | OUTPATIENT
Start: 2023-04-19 | End: 2023-04-19

## 2023-04-19 RX ORDER — SODIUM CHLORIDE 450 MG/100ML
250 INJECTION, SOLUTION INTRAVENOUS CONTINUOUS PRN
Status: DISCONTINUED | OUTPATIENT
Start: 2023-04-19 | End: 2023-04-21

## 2023-04-19 RX ORDER — FENTANYL/ROPIVACAINE/NS/PF 2-625MCG/1
15 PLASTIC BAG, INJECTION (ML) EPIDURAL EVERY 4 HOURS
Status: COMPLETED | OUTPATIENT
Start: 2023-04-19 | End: 2023-04-19

## 2023-04-19 RX ORDER — SODIUM CHLORIDE 9 MG/ML
250 INJECTION, SOLUTION INTRAVENOUS CONTINUOUS PRN
Status: DISCONTINUED | OUTPATIENT
Start: 2023-04-19 | End: 2023-04-21

## 2023-04-19 RX ORDER — NICOTINE POLACRILEX 4 MG
15 LOZENGE BUCCAL
Status: DISCONTINUED | OUTPATIENT
Start: 2023-04-19 | End: 2023-04-21

## 2023-04-19 RX ORDER — ADENOSINE 3 MG/ML
12 INJECTION, SOLUTION INTRAVENOUS ONCE
Status: COMPLETED | OUTPATIENT
Start: 2023-04-19 | End: 2023-04-19

## 2023-04-19 RX ORDER — DILTIAZEM HCL IN NACL,ISO-OSM 125 MG/125
5-20 PLASTIC BAG, INJECTION (ML) INTRAVENOUS
Status: DISCONTINUED | OUTPATIENT
Start: 2023-04-19 | End: 2023-04-21

## 2023-04-19 RX ORDER — ACETAMINOPHEN 10 MG/ML
1000 INJECTION, SOLUTION INTRAVENOUS ONCE
Status: COMPLETED | OUTPATIENT
Start: 2023-04-20 | End: 2023-04-20

## 2023-04-19 RX ORDER — MULTIPLE VITAMINS W/ MINERALS TAB 9MG-400MCG
1 TAB ORAL DAILY
COMMUNITY

## 2023-04-19 RX ORDER — ADENOSINE 3 MG/ML
INJECTION, SOLUTION INTRAVENOUS
Status: COMPLETED
Start: 2023-04-19 | End: 2023-04-19

## 2023-04-19 RX ORDER — DEXTROSE, SODIUM CHLORIDE, AND POTASSIUM CHLORIDE 5; .45; .3 G/100ML; G/100ML; G/100ML
150 INJECTION INTRAVENOUS CONTINUOUS PRN
Status: DISCONTINUED | OUTPATIENT
Start: 2023-04-19 | End: 2023-04-21

## 2023-04-19 RX ORDER — POTASSIUM CHLORIDE 7.45 MG/ML
10 INJECTION INTRAVENOUS CONTINUOUS PRN
Status: DISCONTINUED | OUTPATIENT
Start: 2023-04-19 | End: 2023-04-21

## 2023-04-19 RX ORDER — LORAZEPAM 2 MG/ML
1 INJECTION INTRAMUSCULAR ONCE
Status: COMPLETED | OUTPATIENT
Start: 2023-04-19 | End: 2023-04-19

## 2023-04-19 RX ORDER — CEPHALEXIN 500 MG/1
500 CAPSULE ORAL 2 TIMES DAILY
COMMUNITY
End: 2023-04-19

## 2023-04-19 RX ORDER — DEXTROSE MONOHYDRATE 25 G/50ML
10-50 INJECTION, SOLUTION INTRAVENOUS
Status: DISCONTINUED | OUTPATIENT
Start: 2023-04-19 | End: 2023-04-21

## 2023-04-19 RX ORDER — SODIUM CHLORIDE AND POTASSIUM CHLORIDE 300; 900 MG/100ML; MG/100ML
250 INJECTION, SOLUTION INTRAVENOUS CONTINUOUS PRN
Status: DISCONTINUED | OUTPATIENT
Start: 2023-04-19 | End: 2023-04-21

## 2023-04-19 RX ORDER — SODIUM CHLORIDE 0.9 % (FLUSH) 0.9 %
10 SYRINGE (ML) INJECTION EVERY 12 HOURS SCHEDULED
Status: DISCONTINUED | OUTPATIENT
Start: 2023-04-19 | End: 2023-04-26 | Stop reason: HOSPADM

## 2023-04-19 RX ORDER — ADENOSINE 3 MG/ML
6 INJECTION, SOLUTION INTRAVENOUS ONCE
Status: COMPLETED | OUTPATIENT
Start: 2023-04-19 | End: 2023-04-19

## 2023-04-19 RX ORDER — DILTIAZEM HYDROCHLORIDE 5 MG/ML
20 INJECTION INTRAVENOUS ONCE
Status: COMPLETED | OUTPATIENT
Start: 2023-04-19 | End: 2023-04-19

## 2023-04-19 RX ORDER — PIOGLITAZONEHYDROCHLORIDE 30 MG/1
30 TABLET ORAL DAILY
COMMUNITY
Start: 2023-03-29

## 2023-04-19 RX ORDER — SODIUM CHLORIDE 9 MG/ML
40 INJECTION, SOLUTION INTRAVENOUS AS NEEDED
Status: DISCONTINUED | OUTPATIENT
Start: 2023-04-19 | End: 2023-04-26 | Stop reason: HOSPADM

## 2023-04-19 RX ORDER — CLOPIDOGREL BISULFATE 75 MG/1
75 TABLET ORAL DAILY
COMMUNITY
Start: 2023-03-21

## 2023-04-19 RX ORDER — TAMSULOSIN HYDROCHLORIDE 0.4 MG/1
0.4 CAPSULE ORAL DAILY
COMMUNITY
Start: 2023-03-21

## 2023-04-19 RX ORDER — NALOXONE HYDROCHLORIDE 1 MG/ML
INJECTION INTRAMUSCULAR; INTRAVENOUS; SUBCUTANEOUS
Status: DISPENSED
Start: 2023-04-19 | End: 2023-04-19

## 2023-04-19 RX ORDER — SODIUM CHLORIDE 0.9 % (FLUSH) 0.9 %
10 SYRINGE (ML) INJECTION AS NEEDED
Status: DISCONTINUED | OUTPATIENT
Start: 2023-04-19 | End: 2023-04-26 | Stop reason: HOSPADM

## 2023-04-19 RX ORDER — DEXTROSE, SODIUM CHLORIDE, AND POTASSIUM CHLORIDE 5; .45; .15 G/100ML; G/100ML; G/100ML
150 INJECTION INTRAVENOUS CONTINUOUS PRN
Status: DISCONTINUED | OUTPATIENT
Start: 2023-04-19 | End: 2023-04-21

## 2023-04-19 RX ORDER — IPRATROPIUM BROMIDE AND ALBUTEROL SULFATE 2.5; .5 MG/3ML; MG/3ML
3 SOLUTION RESPIRATORY (INHALATION)
Status: DISCONTINUED | OUTPATIENT
Start: 2023-04-19 | End: 2023-04-20

## 2023-04-19 RX ORDER — ASCORBIC ACID 250 MG
250 TABLET ORAL DAILY
COMMUNITY

## 2023-04-19 RX ORDER — OXYCODONE HYDROCHLORIDE AND ACETAMINOPHEN 5; 325 MG/1; MG/1
1 TABLET ORAL EVERY 4 HOURS PRN
COMMUNITY
Start: 2023-04-03

## 2023-04-19 RX ORDER — ONDANSETRON 4 MG/1
4 TABLET, FILM COATED ORAL EVERY 4 HOURS PRN
COMMUNITY
Start: 2023-04-10

## 2023-04-19 RX ORDER — FENTANYL CITRATE 50 UG/ML
INJECTION, SOLUTION INTRAMUSCULAR; INTRAVENOUS
Status: COMPLETED
Start: 2023-04-19 | End: 2023-04-19

## 2023-04-19 RX ORDER — POTASSIUM CHLORIDE, DEXTROSE MONOHYDRATE AND SODIUM CHLORIDE 300; 5; 900 MG/100ML; G/100ML; MG/100ML
150 INJECTION, SOLUTION INTRAVENOUS CONTINUOUS PRN
Status: DISCONTINUED | OUTPATIENT
Start: 2023-04-19 | End: 2023-04-21

## 2023-04-19 RX ORDER — DEXTROSE, SODIUM CHLORIDE, AND POTASSIUM CHLORIDE 5; .9; .15 G/100ML; G/100ML; G/100ML
150 INJECTION INTRAVENOUS CONTINUOUS PRN
Status: DISCONTINUED | OUTPATIENT
Start: 2023-04-19 | End: 2023-04-21

## 2023-04-19 RX ORDER — DEXTROSE AND SODIUM CHLORIDE 5; .45 G/100ML; G/100ML
150 INJECTION, SOLUTION INTRAVENOUS CONTINUOUS PRN
Status: DISCONTINUED | OUTPATIENT
Start: 2023-04-19 | End: 2023-04-21

## 2023-04-19 RX ORDER — IBUPROFEN 600 MG/1
1 TABLET ORAL
Status: DISCONTINUED | OUTPATIENT
Start: 2023-04-19 | End: 2023-04-21

## 2023-04-19 RX ORDER — DEXTROSE AND SODIUM CHLORIDE 5; .9 G/100ML; G/100ML
150 INJECTION, SOLUTION INTRAVENOUS CONTINUOUS PRN
Status: DISCONTINUED | OUTPATIENT
Start: 2023-04-19 | End: 2023-04-21

## 2023-04-19 RX ORDER — ONDANSETRON 2 MG/ML
4 INJECTION INTRAMUSCULAR; INTRAVENOUS EVERY 6 HOURS PRN
Status: DISCONTINUED | OUTPATIENT
Start: 2023-04-19 | End: 2023-04-26 | Stop reason: HOSPADM

## 2023-04-19 RX ORDER — GLIPIZIDE 10 MG/1
10 TABLET ORAL 2 TIMES DAILY
COMMUNITY
Start: 2023-03-29

## 2023-04-19 RX ORDER — AMOXICILLIN 250 MG
2 CAPSULE ORAL DAILY
COMMUNITY
End: 2023-04-19

## 2023-04-19 RX ORDER — SODIUM CHLORIDE AND POTASSIUM CHLORIDE 150; 900 MG/100ML; MG/100ML
250 INJECTION, SOLUTION INTRAVENOUS CONTINUOUS PRN
Status: DISCONTINUED | OUTPATIENT
Start: 2023-04-19 | End: 2023-04-21

## 2023-04-19 RX ADMIN — POTASSIUM CHLORIDE, DEXTROSE MONOHYDRATE AND SODIUM CHLORIDE 150 ML/HR: 300; 5; 450 INJECTION, SOLUTION INTRAVENOUS at 16:59

## 2023-04-19 RX ADMIN — FENTANYL CITRATE 100 MCG: 50 INJECTION, SOLUTION INTRAMUSCULAR; INTRAVENOUS at 08:46

## 2023-04-19 RX ADMIN — POTASSIUM CHLORIDE 10 MEQ: 7.46 INJECTION, SOLUTION INTRAVENOUS at 14:40

## 2023-04-19 RX ADMIN — INSULIN HUMAN 4 UNITS/HR: 1 INJECTION, SOLUTION INTRAVENOUS at 09:38

## 2023-04-19 RX ADMIN — ADENOSINE 6 MG: 3 INJECTION INTRAVENOUS at 08:13

## 2023-04-19 RX ADMIN — DILTIAZEM HYDROCHLORIDE 5 MG/HR: 5 INJECTION INTRAVENOUS at 15:26

## 2023-04-19 RX ADMIN — DILTIAZEM HYDROCHLORIDE 20 MG: 5 INJECTION INTRAVENOUS at 10:41

## 2023-04-19 RX ADMIN — SODIUM CHLORIDE 1000 ML/HR: 9 INJECTION, SOLUTION INTRAVENOUS at 08:37

## 2023-04-19 RX ADMIN — POTASSIUM CHLORIDE 10 MEQ: 7.46 INJECTION, SOLUTION INTRAVENOUS at 12:15

## 2023-04-19 RX ADMIN — AMIODARONE HYDROCHLORIDE 150 MG: 1.5 INJECTION, SOLUTION INTRAVENOUS at 10:08

## 2023-04-19 RX ADMIN — Medication 10 ML: at 11:15

## 2023-04-19 RX ADMIN — CEFEPIME HYDROCHLORIDE 2 G: 2 INJECTION, POWDER, FOR SOLUTION INTRAVENOUS at 15:25

## 2023-04-19 RX ADMIN — POTASSIUM PHOSPHATE, MONOBASIC AND POTASSIUM PHOSPHATE, DIBASIC 15 MMOL: 224; 236 INJECTION, SOLUTION, CONCENTRATE INTRAVENOUS at 16:10

## 2023-04-19 RX ADMIN — VANCOMYCIN HYDROCHLORIDE 1500 MG: 5 INJECTION, POWDER, LYOPHILIZED, FOR SOLUTION INTRAVENOUS at 16:10

## 2023-04-19 RX ADMIN — PIPERACILLIN SODIUM AND TAZOBACTAM SODIUM 3.38 G: 3; .375 INJECTION, POWDER, LYOPHILIZED, FOR SOLUTION INTRAVENOUS at 07:00

## 2023-04-19 RX ADMIN — AMIODARONE HYDROCHLORIDE 1 MG/MIN: 1.8 INJECTION, SOLUTION INTRAVENOUS at 10:34

## 2023-04-19 RX ADMIN — SODIUM CHLORIDE 1000 ML: 9 INJECTION, SOLUTION INTRAVENOUS at 07:46

## 2023-04-19 RX ADMIN — Medication 10 ML: at 09:00

## 2023-04-19 RX ADMIN — AMIODARONE HYDROCHLORIDE 150 MG: 1.5 INJECTION, SOLUTION INTRAVENOUS at 08:56

## 2023-04-19 RX ADMIN — CEFEPIME 2 G: 2 INJECTION, POWDER, FOR SOLUTION INTRAVENOUS at 22:52

## 2023-04-19 RX ADMIN — LORAZEPAM 1 MG: 2 INJECTION INTRAMUSCULAR; INTRAVENOUS at 08:27

## 2023-04-19 RX ADMIN — IPRATROPIUM BROMIDE AND ALBUTEROL SULFATE 3 ML: 2.5; .5 SOLUTION RESPIRATORY (INHALATION) at 18:56

## 2023-04-19 RX ADMIN — Medication 10 ML: at 20:33

## 2023-04-19 RX ADMIN — IOPAMIDOL 100 ML: 755 INJECTION, SOLUTION INTRAVENOUS at 07:35

## 2023-04-19 RX ADMIN — SODIUM CHLORIDE 1000 ML: 9 INJECTION, SOLUTION INTRAVENOUS at 10:11

## 2023-04-19 RX ADMIN — POTASSIUM PHOSPHATE, MONOBASIC AND POTASSIUM PHOSPHATE, DIBASIC 15 MMOL: 224; 236 INJECTION, SOLUTION, CONCENTRATE INTRAVENOUS at 20:33

## 2023-04-19 RX ADMIN — POTASSIUM CHLORIDE 10 MEQ: 7.46 INJECTION, SOLUTION INTRAVENOUS at 12:16

## 2023-04-19 RX ADMIN — ADENOSINE 12 MG: 3 INJECTION, SOLUTION INTRAVENOUS at 08:22

## 2023-04-19 RX ADMIN — ADENOSINE 12 MG: 3 INJECTION INTRAVENOUS at 08:22

## 2023-04-19 RX ADMIN — POTASSIUM CHLORIDE 10 MEQ: 7.46 INJECTION, SOLUTION INTRAVENOUS at 09:17

## 2023-04-19 RX ADMIN — Medication 10 ML: at 20:31

## 2023-04-19 RX ADMIN — POTASSIUM PHOSPHATE, MONOBASIC AND POTASSIUM PHOSPHATE, DIBASIC 15 MMOL: 224; 236 INJECTION, SOLUTION, CONCENTRATE INTRAVENOUS at 13:01

## 2023-04-19 RX ADMIN — POTASSIUM CHLORIDE 10 MEQ: 7.46 INJECTION, SOLUTION INTRAVENOUS at 14:35

## 2023-04-19 RX ADMIN — POTASSIUM CHLORIDE, DEXTROSE MONOHYDRATE AND SODIUM CHLORIDE 150 ML/HR: 150; 5; 450 INJECTION, SOLUTION INTRAVENOUS at 20:31

## 2023-04-19 RX ADMIN — SODIUM CHLORIDE 500 ML: 9 INJECTION, SOLUTION INTRAVENOUS at 06:36

## 2023-04-19 RX ADMIN — ADENOSINE 6 MG: 3 INJECTION, SOLUTION INTRAVENOUS at 08:13

## 2023-04-19 NOTE — PROGRESS NOTES
"  [facility]   Cardiology Consult Note    Patient Name: Cristi Maloney  : 1940  MRN: [id number]  Primary Care Physician:  Gonzalo Nugent MD  Referring Physician: No ref. provider found  Date of admission: 2023    Subjective   Subjective     Reason for Consult/ Chief Complaint: Weakness, unresponsiveness.    HPI:  Cristi Maloney is a 82 y.o. male with history of coronary artery disease status post RCA stent back in  and then apparently another stent approximately 2 years ago.  He also has diabetes, hypertension and hyperlipidemia.  He has a long smoking history before quitting 4 years ago.  He had a surgery on his left shoulder back on April 10.  They states since Saturday he has had a \"stomach virus.\"  They do note some nausea and vomiting.  No diarrhea but some constipation and was put on a stool softener.  Today he needed help getting up to the restroom as he was very weak.  They were helping him also get back to the couch and when he laid down he became less responsive.  They states that he would only look up at the adwoa and would not respond to their questions.  Some of the other family members came over and called EMS.    Review of Systems   All systems were reviewed and negative except for: Decreased responsiveness, nausea and vomiting.    Personal History     No past medical history on file.     Past medical history significant for coronary artery disease status post stent to the PL branch of the RCA back in  and apparent another stent 2 years ago, diabetes, chronic low back pain, hypertension, hyperlipidemia      Family History: family history is not on file. Otherwise pertinent FHx was reviewed and not pertinent to current issue.    Social History:      Home Medications:  ascorbic acid, atenolol, clopidogrel, glipizide, metFORMIN, montelukast, multivitamin with minerals, ondansetron, oxyCODONE-acetaminophen, pioglitazone, tamsulosin, and tiZANidine    Allergies:  No Known " Allergies    Objective    Objective     Vitals:   Temp:  [100 °F (37.8 °C)] 100 °F (37.8 °C)  Heart Rate:  [126-192] 169  Resp:  [22-34] 22  BP: (107-161)/() 130/60  Flow (L/min):  [15-60] 60      Physical Exam:   Constitutional: Awake, alert, No acute distress    Eyes: PERRLA, sclerae anicteric, no conjunctival injection   HENT: NCAT, mucous membranes moist   Neck: Supple, no thyromegaly, no lymphadenopathy, trachea midline   Respiratory: Clear to auscultation bilaterally, nonlabored respirations    Cardiovascular: RRR, no murmurs, rubs, or gallops, palpable pedal pulses bilaterally   Gastrointestinal: Positive bowel sounds, soft, nontender, nondistended   Musculoskeletal: No bilateral ankle edema, no clubbing or cyanosis to extremities   Psychiatric: Appropriate affect, cooperative   Neurologic: Oriented x 3, strength symmetric in all extremities, Cranial Nerves grossly intact to confrontation, speech clear   Skin: No rashes     Result Review    Result Review:  I have personally reviewed the results from the time of this admission to 4/19/2023 10:30 EDT and agree with these findings:  [x]  Laboratory  []  Microbiology  [x]  Radiology  [x]  EKG/Telemetry   [x]  Cardiology/Vascular   []  Pathology  [x]  Old records  []  Other:  Most notable findings include:     CMP        6/23/2022    10:32 4/19/2023    06:30 4/19/2023    06:33   CMP   Glucose  606   594     Glucose 162          BUN 16      39      Creatinine 0.92      1.45      EGFR  48.4      Sodium 138      137   139.3     Potassium 4.2      3.2      Chloride 101      100      Calcium 9.8      9.7      Total Protein 7.4      6.9      Albumin 4.5      3.3      Globulin 2.9      3.6      Total Bilirubin 0.9      1.3      Alkaline Phosphatase 52      68      AST (SGOT) 27      10      ALT (SGPT) 21      11      Albumin/Globulin Ratio  0.9      BUN/Creatinine Ratio 17.4      26.9      Anion Gap  23.6          This result is from an external source.      CBC         11/10/2022    13:34 3/22/2023    11:12 4/19/2023    06:30 4/19/2023    10:10   CBC   WBC 7.32      6.77      17.84   16.05     RBC 4.39      4.41      4.48   4.04     Hemoglobin 13.8      13.7      14.2   12.9     Hematocrit 43.2      43.8      42.7   38.4     MCV 98.4      99.3      95.3   95.0     MCH 31.4      31.1      31.7   31.9     MCHC 31.9      31.3      33.3   33.6     RDW 13.3      13.9      13.8   13.8     Platelets 208      192      330   299         This result is from an external source.      Lab Results   Component Value Date    TROPONINT 99 (C) 04/19/2023         Assessment & Plan   Assessment / Plan     Brief Patient Summary:  Cristi Maloney is a 82 y.o. male who has a history of coronary artery disease status post stent to the PL branch of the RCA back in 2014 and another stent approximately 2 years ago to unknown vessel.  He also has a history of diabetes, hypertension and hyperlipidemia.  He smoked all the way up until about 4 years ago.  They state he has had a stomach type of virus since Saturday with nausea and vomiting.  When EMS picked him up he was very tachycardic.  Apparently they shocked him with 50 and 100 J with no change.  Once he got to the ED he was shocked with up to 200 J multiple times with no change.  He was also given adenosine 6 and 12 mg with no change.    Active Hospital Problems:  Active Hospital Problems    Diagnosis    • **Sepsis        Assessment:  1.  Multifocal atrial tachycardia  2.  Possible DKA  3.  Chest x-ray and CAT scan with possible aspiration pneumonia.    Plan:   1.  Will rebolus Cardizem.  He is currently on an amiodarone drip.  If the Cardizem helps slow him down then we are going to use the short acting p.o. Cardizem and titrate up.  It can be very difficult to control this heart rate with other problems going on at this time and the patient.  2.  Patient does not require anticoagulation at this time from a cardiac standpoint.  3.  Chest x-ray with  bibasilar infiltrates also seen on CT of the chest concerning for aspiration pneumonia.  There is no obvious pulmonary embolism.  Emphysematous changes were noted along with the left hilar lymphadenopathy.  4.  Patient had Cardiolite stress test and echo back in 2020 that showed normal ejection fraction and no ischemia.    Electronically signed by Sachin Garcia MD, 04/19/23, 10:30 AM EDT.

## 2023-04-19 NOTE — ED PROVIDER NOTES
Time: 6:23 AM EDT  Date of encounter:  2023  Independent Historian/Clinical History and Information was obtained by:   EMS  Chief Complaint: rapid heart rate, hypotension, hyperglycemia    History is limited by: N/A, Confusion    History of Present Illness:  Patient is a 82 y.o. year old male who presents to the emergency department for evaluation of rapid heart rate, hypotension, hyperglycemia. EMS states, upon arrival patient was extremely confused with . EMS states patient went unresponsive and was given synchronized cardioversion with minimal affect. EMS states gave patient Cardizem IV push with minimal affect on heart rate. Patient has hx of HTN, CAD, CHF. unresponsive off and on with blood sugar of 550.         History provided by:  EMS personnel and patient   used: No        Patient Care Team  Primary Care Provider: Gonzalo Nugent MD    Past Medical History:     No Known Allergies  Past Medical History:   Diagnosis Date   • CHF (congestive heart failure)    • Elevated cholesterol    • Hypertension      Past Surgical History:   Procedure Laterality Date   • JOINT REPLACEMENT       History reviewed. No pertinent family history.    Home Medications:  Prior to Admission medications    Not on File        Social History:   Social History     Tobacco Use   • Smoking status: Former     Packs/day: 1.00     Years: 15.00     Pack years: 15.00     Types: Cigarettes     Quit date: 4/10/2019     Years since quittin.0     Passive exposure: Current   • Smokeless tobacco: Never   Vaping Use   • Vaping Use: Former   • Devices: Disposable   Substance Use Topics   • Alcohol use: Never   • Drug use: Never         Review of Systems:  Review of Systems   Constitutional: Negative for chills and fever.   HENT: Negative for congestion, rhinorrhea and sore throat.    Eyes: Negative for photophobia.   Respiratory: Negative for apnea, cough, chest tightness and shortness of breath.   "  Cardiovascular: Positive for palpitations. Negative for chest pain.   Gastrointestinal: Negative for abdominal pain, diarrhea, nausea and vomiting.   Endocrine: Negative.    Genitourinary: Negative for difficulty urinating and dysuria.   Musculoskeletal: Negative for back pain, joint swelling and myalgias.   Skin: Negative for color change and wound.   Allergic/Immunologic: Negative.    Neurological: Negative for seizures and headaches.   Psychiatric/Behavioral: Negative.    All other systems reviewed and are negative.       Physical Exam:  /65   Pulse 113   Temp 97.8 °F (36.6 °C) (Axillary)   Resp 22   Ht 177.8 cm (70\")   Wt 80.4 kg (177 lb 4 oz)   SpO2 95%   BMI 25.43 kg/m²     Physical Exam  Vitals and nursing note reviewed.   Constitutional:       General: He is awake.      Appearance: Normal appearance.   HENT:      Head: Normocephalic and atraumatic.      Nose: Nose normal.      Mouth/Throat:      Mouth: Mucous membranes are moist.   Eyes:      Extraocular Movements: Extraocular movements intact.      Pupils: Pupils are equal, round, and reactive to light.   Cardiovascular:      Rate and Rhythm: Regular rhythm. Tachycardia present.      Heart sounds: Normal heart sounds.   Pulmonary:      Effort: Pulmonary effort is normal. Tachypnea present. No respiratory distress.      Breath sounds: Examination of the right-upper field reveals rhonchi. Examination of the left-upper field reveals rhonchi. Examination of the right-middle field reveals rhonchi. Examination of the left-middle field reveals rhonchi. Examination of the right-lower field reveals rhonchi. Examination of the left-lower field reveals rhonchi. Rhonchi present. No wheezing or rales.   Abdominal:      General: Bowel sounds are normal.      Palpations: Abdomen is soft.      Tenderness: There is no abdominal tenderness. There is no guarding or rebound.      Comments: No rigidity   Musculoskeletal:         General: No tenderness. Normal " range of motion.      Cervical back: Normal range of motion and neck supple.   Skin:     General: Skin is warm and dry.      Coloration: Skin is not jaundiced.      Comments: Healing surgical incision left shoulder with no surrounding sing of infection.   Neurological:      General: No focal deficit present.      Mental Status: He is alert and oriented to person, place, and time. Mental status is at baseline.      Sensory: Sensation is intact.      Motor: Motor function is intact.      Coordination: Coordination is intact.   Psychiatric:         Attention and Perception: Attention and perception normal.         Mood and Affect: Mood and affect normal.         Speech: Speech normal.         Behavior: Behavior normal.         Judgment: Judgment normal.                  Procedures:  Chemical Cardioversion    Date/Time: 4/19/2023 8:25 AM  Performed by: Lázaro Hugo MD  Authorized by: Lázaro Hugo MD     Consent:     Consent given by:  Patient  Pre-procedure details:     Cardioversion basis:  Emergent    Rhythm:  Atrial fibrillation  Attempt one:     Cardioversion medication:  Adenosine 6mg (6mg and then 12 mg)      Medication outcome:  No change in rhythm  Attempt two:     Cardioversion medication:  Adenosine 12mg      Shock outcome:  No change in rhythm (Change to slow rhythm for approximately 10 seconds only)   Post-procedure details:     Patient status:  Awake    Patient tolerance of procedure:  Tolerated well, no immediate complications  Electrical Cardioversion    Date/Time: 4/19/2023 8:34 AM  Performed by: Lázaro Hugo MD  Authorized by: Lázaro Hugo MD     Consent:     Consent obtained:  Emergent situation    Risks, benefits, and alternatives were discussed: yes    Pre-procedure details:     Cardioversion basis:  Emergent    Rhythm:  Atrial fibrillation  Attempt one:     Cardioversion mode:  Synchronous    Waveform:  Biphasic    Shock (Joules):  200    Shock outcome:  No change in rhythm  Attempt two:      Cardioversion mode:  Synchronous    Waveform:  Biphasic    Shock (Joules):  200    Shock outcome:  No change in rhythm  Post-procedure details:     Patient status:  Awake    Procedure completion:  Tolerated well, no immediate complications  Central Line At Bedside    Date/Time: 4/19/2023 9:00 AM  Performed by: Lázaro Hugo MD  Authorized by: Lázaro Hugo MD     Consent:     Consent obtained:  Emergent situation    Consent given by:  Patient  Pre-procedure details:     Indication(s): central venous access and insufficient peripheral access      Hand hygiene: Hand hygiene performed prior to insertion      Sterile barrier technique: All elements of maximal sterile technique followed      Skin preparation:  Povidone-iodine    Skin preparation agent: Skin preparation agent completely dried prior to procedure    Sedation:     Sedation type:  None  Anesthesia:     Anesthesia method:  None  Procedure details:     Location:  R femoral    Patient position:  Supine    Procedural supplies:  Triple lumen    Catheter size:  7 Fr    Ultrasound guidance: yes      Ultrasound guidance timing: real time      Sterile ultrasound techniques: Sterile gel and sterile probe covers were used      Number of attempts:  1    Successful placement: yes    Post-procedure details:     Post-procedure:  Dressing applied and line sutured    Assessment:  Blood return through all ports    Procedure completion:  Tolerated well, no immediate complications          Medical Decision Making:      Comorbidities that affect care:    Diabetes    External Notes reviewed:    Previous Clinic Note: Orthopedic surgery office visit for left total shoulder arthroplasty      The following orders were placed and all results were independently analyzed by me:  Orders Placed This Encounter   Procedures   • Chemical Cardioversion   • Electrical Cardioversion   • Insert Central Line At Bedside   • Blood Culture - Blood,   • Blood Culture - Blood,   • S. Pneumo Ag  Urine or CSF - Urine, Urine, Clean Catch   • Legionella Antigen, Urine - Urine, Urine, Clean Catch   • MRSA Screen, PCR (Inpatient) - Swab, Nares   • XR Chest 1 View   • CT Chest With Contrast Diagnostic   • [place] Draw   • Comprehensive Metabolic Panel   • Magnesium   • Single High Sensitivity Troponin T   • CBC Auto Differential   • Lactic Acid, Plasma   • ABG with Co-Ox and Electrolytes   • Acetone   • BNP   • Urinalysis With Culture If Indicated - Urine, Clean Catch   • STAT Lactic Acid, Reflex   • Phosphorus   • Magnesium   • Osmolality, Serum   • Hemoglobin A1c   • Basic Metabolic Panel   • Magnesium   • Phosphorus   • CBC Auto Differential   • Comprehensive Metabolic Panel   • Procalcitonin   • Urinalysis With Culture If Indicated - Indwelling Urethral Catheter   • TSH Rfx On Abnormal To Free T4   • Protime-INR   • High Sensitivity Troponin T   • Urinalysis, Microscopic Only - Urine, Clean Catch   • STAT Lactic Acid, Reflex   • T4, Free   • High Sensitivity Troponin T 2Hr   • Urinalysis, Microscopic Only - Urine, Clean Catch   • STAT Lactic Acid, Reflex   • Vancomycin, Random   • NPO Diet NPO Type: Strict NPO   • Undress & Gown   • Straight Cath   • Vital Signs   • Strict Intake & Output   • Daily Weights   • Continuous Pulse Oximetry   • Formula for Corrected Serum Sodium: Corrected Na= (measured Sodium + [1.6 x ((Glucose - 100)/100)]   • Prior to initiating the Glucommander™, ensure all prior insulin orders are discontinued.   • PRIOR to START of Intravenous Insulin Infusion, Notify Provider if K+ is Less Than 3.3, for Extra Potassium Orders, if Indicated. Do Not Start Insulin Drip if K+ Less Than 3.3.   • Use a Dedicated Line for Insulin Infusion (If Possible).  May Use a Carrier Fluid of NS at KVO Rate if Insulin Rate is Insufficient to Maintain IV Patency.  Prime IV Line With Insulin Infusion   • Once DKA meets resolution criteria, call provider for transition orders from intravenous to SQ insulin and  IV fluids.   • Do not discontinue insulin infusion for 2 hours after first dose of long-acting subcutaneous insulin.   • If insulin infusion is discontinued, Glucommander must also be discontinued. If the insulin infusion is re-ordered, you must discontinue previous settings in Glucommander and start new.   • If patient is being fed a diet or bolus tube feeds, utilize the start meal feature / meal bolus feature in Glucommander.   • Notify Provider   • RN to Release PRN POC Glucose orders as per Glucommander   • RN to Order STAT Glucose for BG less than 10 mg/dl or greater than 600 mg/dl   • If Insulin Infusion is Paused - Follow Glucommander Instructions   • Vital Signs   • Intake & Output   • Weigh Patient   • Oral Care   • Saline Lock & Maintain IV Access   • Place Sequential Compression Device   • Maintain Sequential Compression Device   • Place Venous Foot Pump   • Maintain Venous Foot Pump   • Cardiac Monitoring   • Activity - Strict Bed Rest   • Insert Indwelling Urinary Catheter   • Assess Need for Indwelling Urinary Catheter - Follow Removal Protocol   • Urinary Catheter Care   • Inpatient Diabetes Educator Consult   • Cardiology (on-call MD unless specified)   • Pulmonology (on-call MD unless specified)   • Inpatient Hospitalist Consult   • Oxygen Therapy- Nasal Cannula; Titrate for SPO2: 90% - 95%   • NIPPV (CPAP or BIPAP)   • POC Glucose Once   • POC Glucose PRN   • POC Glucose Once   • POC Glucose Once   • POC Glucose Once   • POC Glucose Once   • ECG 12 Lead ED Triage Standing Order; Dysrhythmia   • Adult Transthoracic Echo Complete W/ Cont if Necessary Per Protocol   • Insert Peripheral IV   • Insert Peripheral IV x2   • Insert Peripheral IV   • Inpatient Admission   • CBC & Differential   • Green Top (Gel)   • Lavender Top   • Gold Top - SST   • Light Blue Top   • CBC & Differential   • CBC & Differential   • CBC & Differential       Medications Given in the Emergency Department:  Medications   sodium  chloride 0.9 % flush 10 mL (has no administration in time range)   sodium chloride 0.9 % flush 10 mL (has no administration in time range)   sodium chloride 0.9 % flush 10 mL (10 mL Intravenous Given 4/19/23 0900)   sodium chloride 0.9 % infusion 40 mL (has no administration in time range)   dextrose (GLUTOSE) oral gel 15 g (has no administration in time range)   dextrose (D50W) (25 g/50 mL) IV injection 10-50 mL (has no administration in time range)   Glucagon (GLUCAGEN) injection 1 mg (has no administration in time range)   sodium chloride 0.9 % bolus (1,000 mL/hr Intravenous Not Given 4/19/23 1112)   sodium chloride 0.9 % infusion (has no administration in time range)   sodium chloride 0.9 % with KCl 20 mEq/L infusion (has no administration in time range)   sodium chloride 0.9 % with KCl 40 mEq/L infusion (has no administration in time range)   dextrose 5 % and sodium chloride 0.9 % infusion (has no administration in time range)   dextrose 5 % and sodium chloride 0.9 % with KCl 20 mEq/L infusion (has no administration in time range)   dextrose 5 % and sodium chloride 0.9 % with KCl 40 mEq/L infusion (has no administration in time range)   sodium chloride 0.45 % infusion (has no administration in time range)   sodium chloride 0.45 % with KCl 20 mEq/L infusion (has no administration in time range)   sodium chloride 0.45 % 1,000 mL with potassium chloride 40 mEq infusion (has no administration in time range)   dextrose 5 % and sodium chloride 0.45 % infusion (has no administration in time range)   dextrose 5 % and sodium chloride 0.45 % with KCl 20 mEq/L infusion (has no administration in time range)   dextrose 5 % and sodium chloride 0.45 % with KCl 40 mEq/L infusion (has no administration in time range)   insulin regular 1 unit/mL in 0.9% sodium chloride (Glucommander) (3.3 Units/hr Intravenous Currently Infusing 4/19/23 1112)   potassium chloride 10 mEq in 100 mL IVPB (10 mEq Intravenous New Bag 4/19/23 1216)    Naloxone HCl (NARCAN) 2 MG/2ML injection  - ADS Override Pull (has no administration in time range)   sodium chloride 0.9 % flush 10 mL (has no administration in time range)   sodium chloride 0.9 % flush 10 mL (10 mL Intravenous Given 4/19/23 1115)   sodium chloride 0.9 % infusion 40 mL (has no administration in time range)   ondansetron (ZOFRAN) tablet 4 mg (has no administration in time range)     Or   ondansetron (ZOFRAN) injection 4 mg (has no administration in time range)   amiodarone in dextrose 5% (NEXTERONE) loading dose 150mg/100mL (150 mg Intravenous New Bag 4/19/23 1008)     Followed by   amiodarone 360 mg in 200 mL D5W infusion (1 mg/min Intravenous New Bag 4/19/23 1034)     Followed by   amiodarone 360 mg in 200 mL D5W infusion (has no administration in time range)   potassium phosphate 15 mmol in 0.9% normal saline 250 mL IV (15 mmol Intravenous Given 4/19/23 1301)   dilTIAZem (CARDIZEM) tablet 15 mg (has no administration in time range)   Pharmacy to dose vancomycin (has no administration in time range)   Pharmacy to Dose Cefepime (has no administration in time range)   cefepime (MAXIPIME) 2 g/100 mL 0.9% NS (mbp) (has no administration in time range)   cefepime (MAXIPIME) 2 g/100 mL 0.9% NS (mbp) (has no administration in time range)   vancomycin 1500 mg/250 mL 0.9% NS IVPB ([facility]) (has no administration in time range)   vancomycin 1250 mg/250 mL 0.9% NS IVPB ([facility]) (has no administration in time range)   sodium chloride 0.9 % bolus 500 mL (0 mL Intravenous Stopped 4/19/23 0653)   piperacillin-tazobactam (ZOSYN) 3.375 g/100 mL 0.9% NS IVPB (mbp) (0 g Intravenous Stopped 4/19/23 0858)   sodium chloride 0.9 % bolus 1,000 mL (0 mL Intravenous Stopped 4/19/23 0858)   iopamidol (ISOVUE-370) 76 % injection 100 mL (100 mL Intravenous Given 4/19/23 0735)   adenosine (ADENOCARD) injection 6 mg (6 mg Intravenous Given 4/19/23 5351)   LORazepam (ATIVAN) injection 1 mg (1 mg Intravenous Given 4/19/23 4602)    adenosine (ADENOCARD) injection 12 mg (12 mg Intravenous Given 4/19/23 0822)   amiodarone in dextrose 5% (NEXTERONE) loading dose 150mg/100mL (0 mg Intravenous Stopped 4/19/23 0941)   fentaNYL citrate (PF) (SUBLIMAZE) injection 100 mcg (100 mcg Intravenous Given 4/19/23 0846)   sodium chloride 0.9 % bolus 1,000 mL (1,000 mL Intravenous New Bag 4/19/23 1011)   dilTIAZem (CARDIZEM) injection 20 mg (20 mg Intravenous Given 4/19/23 1041)        ED Course:         Labs:    Lab Results (last 24 hours)     Procedure Component Value Units Date/Time    POC Glucose Once [[id number]]  (Abnormal) Collected: 04/19/23 0625    Specimen: Blood Updated: 04/19/23 0712     Glucose 547 mg/dL      Comment: Serial Number: [id number]:  [id number]       CBC & Differential [[id number]]  (Abnormal) Collected: 04/19/23 0630    Specimen: Blood Updated: 04/19/23 0638    Narrative:      The following orders were created for panel order CBC & Differential.  Procedure                               Abnormality         Status                     ---------                               -----------         ------                     CBC Auto Differential[[id number]]        Abnormal            Final result                 Please view results for these tests on the individual orders.    Comprehensive Metabolic Panel [[id number]]  (Abnormal) Collected: 04/19/23 0630    Specimen: Blood Updated: 04/19/23 0708     Glucose 606 mg/dL      BUN 39 mg/dL      Creatinine 1.45 mg/dL      Sodium 137 mmol/L      Potassium 3.2 mmol/L      Chloride 100 mmol/L      CO2 13.4 mmol/L      Calcium 9.7 mg/dL      Total Protein 6.9 g/dL      Albumin 3.3 g/dL      ALT (SGPT) 11 U/L      AST (SGOT) 10 U/L      Alkaline Phosphatase 68 U/L      Total Bilirubin 1.3 mg/dL      Globulin 3.6 gm/dL      A/G Ratio 0.9 g/dL      BUN/Creatinine Ratio 26.9     Anion Gap 23.6 mmol/L      eGFR 48.4 mL/min/1.73     Narrative:      GFR Normal >60  Chronic Kidney Disease <60  Kidney  Failure <15    The GFR formula is only valid for adults with stable renal function between ages 18 and 70.    Magnesium [[id number]]  (Normal) Collected: 04/19/23 0630    Specimen: Blood Updated: 04/19/23 0708     Magnesium 2.4 mg/dL     Single High Sensitivity Troponin T [[id number]]  (Abnormal) Collected: 04/19/23 0630    Specimen: Blood Updated: 04/19/23 0708     HS Troponin T 99 ng/L     Narrative:      High Sensitive Troponin T Reference Range:  <10.0 ng/L- Negative Female for AMI  <15.0 ng/L- Negative Male for AMI  >=10 - Abnormal Female indicating possible myocardial injury.  >=15 - Abnormal Male indicating possible myocardial injury.   Clinicians would have to utilize clinical acumen, EKG, Troponin, and serial changes to determine if it is an Acute Myocardial Infarction or myocardial injury due to an underlying chronic condition.         CBC Auto Differential [[id number]]  (Abnormal) Collected: 04/19/23 0630    Specimen: Blood Updated: 04/19/23 0638     WBC 17.84 10*3/mm3      RBC 4.48 10*6/mm3      Hemoglobin 14.2 g/dL      Hematocrit 42.7 %      MCV 95.3 fL      MCH 31.7 pg      MCHC 33.3 g/dL      RDW 13.8 %      RDW-SD 48.5 fl      MPV 10.2 fL      Platelets 330 10*3/mm3      Neutrophil % 91.1 %      Lymphocyte % 2.3 %      Monocyte % 5.5 %      Eosinophil % 0.7 %      Basophil % 0.2 %      Immature Grans % 0.2 %      Neutrophils, Absolute 16.24 10*3/mm3      Lymphocytes, Absolute 0.41 10*3/mm3      Monocytes, Absolute 0.99 10*3/mm3      Eosinophils, Absolute 0.12 10*3/mm3      Basophils, Absolute 0.04 10*3/mm3      Immature Grans, Absolute 0.04 10*3/mm3      nRBC 0.1 /100 WBC     Blood Culture - Blood, Arm, Right [[id number]] Collected: 04/19/23 0630    Specimen: Blood from Arm, Right Updated: 04/19/23 0634    Blood Culture - Blood, Arm, Left [[id number]] Collected: 04/19/23 0630    Specimen: Blood from Arm, Left Updated: 04/19/23 0633    Acetone [[id number]]  (Abnormal) Collected: 04/19/23 9030    Specimen:  Blood Updated: 04/19/23 0648     Acetone Small    BNP [[id number]]  (Abnormal) Collected: 04/19/23 0630    Specimen: Blood Updated: 04/19/23 0706     proBNP 2,283.0 pg/mL     Narrative:      Among patients with dyspnea, NT-proBNP is highly sensitive for the detection of acute congestive heart failure. In addition NT-proBNP of <300 pg/ml effectively rules out acute congestive heart failure with 99% negative predictive value.    Results may be falsely decreased if patient taking Biotin.      Phosphorus [[id number]]  (Abnormal) Collected: 04/19/23 0630    Specimen: Blood Updated: 04/19/23 0800     Phosphorus 1.5 mg/dL     Osmolality, Serum [[id number]]  (Abnormal) Collected: 04/19/23 0630    Specimen: Blood Updated: 04/19/23 0757     Osmolality 328 mOsm/kg     Hemoglobin A1c [[id number]] Collected: 04/19/23 0630    Specimen: Blood Updated: 04/19/23 0735    Protime-INR [[id number]]  (Abnormal) Collected: 04/19/23 0630    Specimen: Blood Updated: 04/19/23 1128     Protime 15.1 Seconds      INR 1.19    Narrative:      Suggested Therapeutic Ranges For Oral Anticoagulant Therapy:  Level of Therapy                      INR Target Range  Standard Dose                            2.0-3.0  High Dose                                2.5-3.5  Patients not receiving anticoagulant  Therapy Normal Range                     0.86-1.15    ABG with Co-Ox and Electrolytes [[id number]]  (Abnormal) Collected: 04/19/23 0633    Specimen: Arterial Blood from Arm, Right Updated: 04/19/23 0637     pH, Arterial 7.307 pH units      pCO2, Arterial 26.7 mm Hg      pO2, Arterial 72.9 mm Hg      HCO3, Arterial 13.1 mmol/L      Base Excess, Arterial -11.5 mmol/L      O2 Saturation, Arterial 92.5 %      Hemoglobin, Blood Gas 14.7 g/dL      Carboxyhemoglobin 1.2 %      Methemoglobin 0.00 %      Oxyhemoglobin 91.4 %      FHHB 7.4 %      Mario's Test Positive     Site Arterial: right radial     Modality Mask - Nonbreather     FIO2 100 %      Flow Rate 15 lpm       Sodium, Arterial 139.3 mmol/L      Potassium, Arterial 3.29 mmol/L      Ionized Calcium, Arterial 1.34 mmol/L      Chloride, Arterial 105 mmol/L      Glucose, Arterial 594 mg/dL      Lactate, Arterial 5.25 mmol/L      PO2/FIO2 73    Lactic Acid, Plasma [[id number]]  (Abnormal) Collected: 04/19/23 0652    Specimen: Blood Updated: 04/19/23 0724     Lactate 5.1 mmol/L     POC Glucose Once [[id number]]  (Abnormal) Collected: 04/19/23 0930    Specimen: Blood Updated: 04/19/23 0941     Glucose 464 mg/dL      Comment: Serial Number: [id number]:  [id number]       Urinalysis With Culture If Indicated - Urine, Clean Catch [[id number]]  (Abnormal) Collected: 04/19/23 1010    Specimen: Urine, Clean Catch Updated: 04/19/23 1034     Color, UA Yellow     Appearance, UA Clear     pH, UA <=5.0     Specific Gravity, UA >=1.030     Glucose, UA >=1000 mg/dL (3+)     Ketones, UA 15 mg/dL (1+)     Bilirubin, UA Negative     Blood, UA Trace     Protein, UA 30 mg/dL (1+)     Leuk Esterase, UA Negative     Nitrite, UA Negative     Urobilinogen, UA 0.2 E.U./dL    Narrative:      In absence of clinical symptoms, the presence of pyuria, bacteria, and/or nitrites on the urinalysis result does not correlate with infection.    STAT Lactic Acid, Reflex [[id number]]  (Abnormal) Collected: 04/19/23 1010    Specimen: Blood Updated: 04/19/23 1053     Lactate 3.3 mmol/L     CBC & Differential [[id number]]  (Abnormal) Collected: 04/19/23 1010    Specimen: Blood Updated: 04/19/23 1021    Narrative:      The following orders were created for panel order CBC & Differential.  Procedure                               Abnormality         Status                     ---------                               -----------         ------                     CBC Auto Differential[[id number]]        Abnormal            Final result                 Please view results for these tests on the individual orders.    CBC Auto Differential [[id number]]  (Abnormal) Collected:  04/19/23 1010    Specimen: Blood Updated: 04/19/23 1021     WBC 16.05 10*3/mm3      RBC 4.04 10*6/mm3      Hemoglobin 12.9 g/dL      Hematocrit 38.4 %      MCV 95.0 fL      MCH 31.9 pg      MCHC 33.6 g/dL      RDW 13.8 %      RDW-SD 48.9 fl      MPV 10.6 fL      Platelets 299 10*3/mm3      Neutrophil % 92.9 %      Lymphocyte % 3.5 %      Monocyte % 3.1 %      Eosinophil % 0.1 %      Basophil % 0.2 %      Immature Grans % 0.2 %      Neutrophils, Absolute 14.91 10*3/mm3      Lymphocytes, Absolute 0.56 10*3/mm3      Monocytes, Absolute 0.50 10*3/mm3      Eosinophils, Absolute 0.01 10*3/mm3      Basophils, Absolute 0.03 10*3/mm3      Immature Grans, Absolute 0.04 10*3/mm3      nRBC 0.0 /100 WBC     Urinalysis, Microscopic Only - Urine, Clean Catch [[id number]]  (Abnormal) Collected: 04/19/23 1010    Specimen: Urine, Clean Catch Updated: 04/19/23 1034     RBC, UA 0-2 /HPF      WBC, UA 3-5 /HPF      Comment: Urine culture not indicated.        Bacteria, UA None Seen /HPF      Squamous Epithelial Cells, UA 0-2 /HPF      Transitional Epithelial Cells, UA 0-2 /HPF      Hyaline Casts, UA None Seen /LPF      Methodology Manual Light Microscopy    POC Glucose Once [[id number]]  (Abnormal) Collected: 04/19/23 1110    Specimen: Blood Updated: 04/19/23 1111     Glucose 390 mg/dL      Comment: Serial Number: [id number]:  [id number]       Magnesium [[id number]]  (Normal) Collected: 04/19/23 1114    Specimen: Blood Updated: 04/19/23 1153     Magnesium 2.1 mg/dL     Basic Metabolic Panel [[id number]]  (Abnormal) Collected: 04/19/23 1114    Specimen: Blood Updated: 04/19/23 1153     Glucose 351 mg/dL      BUN 35 mg/dL      Creatinine 1.21 mg/dL      Sodium 143 mmol/L      Potassium 2.9 mmol/L      Chloride 111 mmol/L      CO2 15.4 mmol/L      Calcium 8.7 mg/dL      BUN/Creatinine Ratio 28.9     Anion Gap 16.6 mmol/L      eGFR 60.2 mL/min/1.73     Narrative:      GFR Normal >60  Chronic Kidney Disease <60  Kidney Failure <15    The  "GFR formula is only valid for adults with stable renal function between ages 18 and 70.    Phosphorus [[id number]]  (Abnormal) Collected: 04/19/23 1114    Specimen: Blood Updated: 04/19/23 1208     Phosphorus 0.9 mg/dL      Comment: Verified by repeat analysis.       High Sensitivity Troponin T [[id number]]  (Abnormal) Collected: 04/19/23 1114    Specimen: Blood Updated: 04/19/23 1216     HS Troponin T 89 ng/L      Comment: Verified by repeat analysis.       Narrative:      High Sensitive Troponin T Reference Range:  <10.0 ng/L- Negative Female for AMI  <15.0 ng/L- Negative Male for AMI  >=10 - Abnormal Female indicating possible myocardial injury.  >=15 - Abnormal Male indicating possible myocardial injury.   Clinicians would have to utilize clinical acumen, EKG, Troponin, and serial changes to determine if it is an Acute Myocardial Infarction or myocardial injury due to an underlying chronic condition.         Procalcitonin [[id number]]  (Abnormal) Collected: 04/19/23 1114    Specimen: Blood Updated: 04/19/23 1156     Procalcitonin 10.64 ng/mL     Narrative:      As a Marker for Sepsis (Non-Neonates):    1. <0.5 ng/mL represents a low risk of severe sepsis and/or septic shock.  2. >2 ng/mL represents a high risk of severe sepsis and/or septic shock.    As a Marker for Lower Respiratory Tract Infections that require antibiotic therapy:    PCT on Admission    Antibiotic Therapy       6-12 Hrs later    >0.5                Strongly Recommended  >0.25 - <0.5        Recommended  0.1 - 0.25          Discouraged              Remeasure/reassess PCT  <0.1                Strongly Discouraged     Remeasure/reassess PCT    As 28 day mortality risk marker: \"Change in Procalcitonin Result\" (>80% or <=80%) if Day 0 (or Day 1) and Day 4 values are available. Refer to http://www.[employer]idemakerzs-pct-calculator.com    Change in PCT <=80%  A decrease of PCT levels below or equal to 80% defines a positive change in PCT test result representing a " higher risk for 28-day all-cause mortality of patients diagnosed with severe sepsis for septic shock.    Change in PCT >80%  A decrease of PCT levels of more than 80% defines a negative change in PCT result representing a lower risk for 28-day all-cause mortality of patients diagnosed with severe sepsis or septic shock.    This test is Prognostic not Diagnostic, if elevated correlate with clinical findings before administering antibiotic treatment.        TSH Rfx On Abnormal To Free T4 [[id number]]  (Abnormal) Collected: 04/19/23 1114    Specimen: Blood Updated: 04/19/23 1156     TSH 0.036 uIU/mL     T4, Free [[id number]]  (Abnormal) Collected: 04/19/23 1114    Specimen: Blood Updated: 04/19/23 1222     Free T4 2.15 ng/dL     Narrative:      Results may be falsely increased if patient taking Biotin.      POC Glucose Once [[id number]]  (Abnormal) Collected: 04/19/23 1217    Specimen: Blood Updated: 04/19/23 1218     Glucose 308 mg/dL      Comment: Serial Number: [id number]:  [id number]       Urinalysis With Culture If Indicated - Indwelling Urethral Catheter [[id number]]  (Abnormal) Collected: 04/19/23 1258    Specimen: Urine from Indwelling Urethral Catheter Updated: 04/19/23 1333     Color, UA Yellow     Appearance, UA Cloudy     pH, UA <=5.0     Specific Gravity, UA >1.030     Glucose, UA >=1000 mg/dL (3+)     Ketones, UA Negative     Bilirubin, UA Negative     Blood, UA Small (1+)     Protein, UA 30 mg/dL (1+)     Leuk Esterase, UA Negative     Nitrite, UA Negative     Urobilinogen, UA 0.2 E.U./dL    Narrative:      In absence of clinical symptoms, the presence of pyuria, bacteria, and/or nitrites on the urinalysis result does not correlate with infection.    Urinalysis, Microscopic Only - Indwelling Urethral Catheter [[id number]]  (Abnormal) Collected: 04/19/23 1258    Specimen: Urine from Indwelling Urethral Catheter Updated: 04/19/23 1333     RBC, UA None Seen /HPF      WBC, UA 0-2 /HPF      Comment: Urine  culture not indicated.        Bacteria, UA None Seen /HPF      Squamous Epithelial Cells, UA None Seen /HPF      Hyaline Casts, UA None Seen /LPF      Uric Acid Crystals, UA Moderate/2+ /HPF      Methodology Manual Light Microscopy    Basic Metabolic Panel [[id number]]  (Abnormal) Collected: 04/19/23 1300    Specimen: Blood Updated: 04/19/23 1334     Glucose 280 mg/dL      BUN 34 mg/dL      Creatinine 1.11 mg/dL      Sodium 143 mmol/L      Potassium 3.0 mmol/L      Chloride 113 mmol/L      CO2 14.9 mmol/L      Calcium 9.0 mg/dL      BUN/Creatinine Ratio 30.6     Anion Gap 15.1 mmol/L      eGFR 66.7 mL/min/1.73     Narrative:      GFR Normal >60  Chronic Kidney Disease <60  Kidney Failure <15    The GFR formula is only valid for adults with stable renal function between ages 18 and 70.    STAT Lactic Acid, Reflex [[id number]]  (Abnormal) Collected: 04/19/23 1300    Specimen: Blood Updated: 04/19/23 1343     Lactate 3.1 mmol/L     S. Pneumo Ag Urine or CSF - Urine, Urine, Clean Catch [[id number]]  (Abnormal) Collected: 04/19/23 1300    Specimen: Urine, Clean Catch Updated: 04/19/23 1351     Strep Pneumo Ag Positive    Legionella Antigen, Urine - Urine, Urine, Clean Catch [[id number]]  (Normal) Collected: 04/19/23 1300    Specimen: Urine, Clean Catch Updated: 04/19/23 1350     LEGIONELLA ANTIGEN, URINE Negative    High Sensitivity Troponin T 2Hr [[id number]]  (Abnormal) Collected: 04/19/23 1300    Specimen: Blood Updated: 04/19/23 1344     HS Troponin T 91 ng/L      Troponin T Delta 2 ng/L     Narrative:      High Sensitive Troponin T Reference Range:  <10.0 ng/L- Negative Female for AMI  <15.0 ng/L- Negative Male for AMI  >=10 - Abnormal Female indicating possible myocardial injury.  >=15 - Abnormal Male indicating possible myocardial injury.   Clinicians would have to utilize clinical acumen, EKG, Troponin, and serial changes to determine if it is an Acute Myocardial Infarction or myocardial injury due to an underlying  chronic condition.         POC Glucose Once [[id number]]  (Abnormal) Collected: 04/19/23 1322    Specimen: Blood Updated: 04/19/23 1323     Glucose 282 mg/dL      Comment: Serial Number: [id number]:  [id number]              Imaging:    CT Chest With Contrast Diagnostic    Result Date: 4/19/2023  PROCEDURE: CT CHEST W CONTRAST DIAGNOSTIC  COMPARISON:  None INDICATIONS: shortness of breath  TECHNIQUE: After obtaining the patient's consent, CT images were obtained with non-ionic intravenous contrast material.   PROTOCOL:   Pulmonary embolism imaging protocol performed    RADIATION:   DLP: 525.7mGy*cm   Automated exposure control was utilized to minimize radiation dose. CONTRAST: 100cc Isovue 370 I.V.  FINDINGS:  There is advanced emphysematous lung disease.  There is airspace disease dependently in both lower lobes.  No nodules or masses are identified.  There is atherosclerotic disease in the aorta and coronary arteries.  There is no mediastinal lymphadenopathy.  There are enlarged left hilar nodes measuring 2.3 by 1.3 cm.  There are borderline enlarged right hilar nodes.  Densely calcified nodes are present in the left hilum.  No acute findings are present in the upper abdomen.  Degenerative changes are present in the dorsal spine without definite bone destruction.  There are postoperative changes of left shoulder arthroplasty.  The aorta is of normal caliber.  There is significant motion degradation in the lung bases.  There are no obvious filling defects within the main or proximal pulmonary arteries.        1. Significant motion artifact is present in the bases.  No central pulmonary emboli are identified.  2. Extensive consolidation in both lower lobes raising the question of aspiration. 3. Advanced emphysematous lung disease 4. Extensive coronary artery atherosclerotic calcifications. 5. Left hilar lymphadenopathy with a 2.3 cm node.  There are densely calcified nodes in the left hilum as well.  Smaller  nodes are present within the right hilum.  There is no mediastinal lymphadenopathy. 6. Postop changes of recent left shoulder arthroplasty.     Jordan Marrero MD       Electronically Signed and Approved By: Jordan Marrero MD on 4/19/2023 at 7:46             XR Chest 1 View    Result Date: 4/19/2023  PROCEDURE: XR CHEST 1 VW  COMPARISON: [facility], CR, CHEST AP/PA 1 VIEW, 12/03/2013, 16:00.  INDICATIONS: Dysrhythmia  FINDINGS:  Infiltrates are noted in the lung bases.  The cardiac silhouette and mediastinum are stable.  Aortic atherosclerosis is noted.  No acute osseous abnormalities are observed.        1. Bibasilar infiltrates are noted.  The findings may relate to bibasilar pneumonia.  Changes of CHF and pulmonary edema could also have this appearance.       ANAYA FELDMAN MD       Electronically Signed and Approved By: ANAYA FELDMAN MD on 4/19/2023 at 6:46                 Differential Diagnosis and Discussion:    Dyspnea: Differential diagnosis includes but is not limited to metabolic acidosis, neurological disorders, psychogenic, asthma, pneumothorax, upper airway obstruction, COPD, pneumonia, noncardiogenic pulmonary edema, interstitial lung disease, anemia, congestive heart failure, and pulmonary embolism  Metabolic: Differential diagnosis includes but is not limited to hypertension, hyperglycemia, hyperkalemia, hypocalcemia, metabolic acidosis, hypokalemia, hypoglycemia, malnutrition, hypothyroidism, hyperthyroidism, and adrenal insufficiency.     All labs were reviewed and interpreted by me.  All X-rays impressions were independently interpreted by me.  EKG was interpreted by me.  CT scan radiology impression was interpreted by me.    MDM  Number of Diagnoses or Management Options  Atrial fibrillation with rapid ventricular response  Diabetic ketoacidosis without coma associated with other specified diabetes mellitus  Hypoxia  Pneumonia of both lower lobes due to infectious organism  Septic  shock  Diagnosis management comments: In summary this is an 82-year-old male who presents emerged department by EMS from home for evaluation.  He presents extremely tachycardic, hypoxic with altered mental status.  Found to be hyperglycemic, early DKA.  Chest x-ray reveals dense bilateral lower lobe pneumonia.  CT scan of the chest with IV contrast is negative for pulmonary embolus.  CBC independently reviewed by me and shows no critical abnormalities except for leukocytosis.  Initial lactate greater than 5.  Patient received multiple attempts at reduction in his heart rate.  EMS administered 50 J followed by 100 J for synchronized cardioversion without effect.  They also administered 20 mg of Cardizem IV push without effect.  In the emergency department patient underwent multiple attempts at cardioversion both chemically and electrically without success.  Ultimately patient did receive amiodarone with some improvement of his heart rate although not significant.  Cardiology was consulted and has seen and evaluated the patient in the emergency department and made additional recommendations.  Patient was placed on BiPAP settings of 12/6 and pulmonology was consulted for further management of his acute hypoxic respiratory failure due to bilateral pneumonia.  Patient was also placed on IV fluids and an insulin drip.  Potassium repletion initiated.  Patient case has been discussed with the hospitalist team who will admit to the hospital for further evaluation and continuation of treatment.     Sepsis criteria was met in the emergency department and the Sepsis protocol (including antibiotic administration) was initiated.      SIRS criteria considered:   1.  Temperature > 100.4 or <98.6    2.  Heart Rate > 90    3.  Respiratory Rate > 22    4.  WBC > 12K or <4K.             Severe Sepsis:     Respiratory: Mechanical Ventilation or Bipap  Hypotension: SBP > 90 or MAP < 65  Renal: Creatinine > 2  Metabolic: Lactic Acid >  2  Hematologic: Platelets < 100K or INR > 1.5  Hepatic: BILI  >  2  CNS: Sudden AMS     Septic Shock:     Severe Sepsis + Persistent hypotension or Lactic Acid > 4     Normal saline bolus, Antibiotics, and final disposition was based on these definitions.        Sepsis was recognized at 645    Antibiotics were ordered.     30 cc/kg bolus was indicated.     The patient was ordered 2.5 L of fluids.    Total Critical Care time of 100 minutes. Total critical care time documented does not include time spent on separately billed procedures for services of nurses or physician assistants. I personally saw and examined the patient. I have reviewed all diagnostic interpretations and treatment plans as written. I was present for the key portions of any procedures performed and the inclusive time noted in any critical care statement. Critical care time includes patient management by me, time spent at the patients bedside,  time to review lab and imaging results, discussing patient care, documentation in the medical record, and time spent with family or caregiver.        Patient Care Considerations:    CT HEAD: I considered ordering a noncontrast CT of the head, however No focal neurodeficits      Consultants/Shared Management Plan:    Hospitalist: I have discussed the case with Dr. Davis who agrees to accept the patient for admission.  Consultant: I have discussed the case with Dr. Meier who agrees to consult on the patient.  Consultant: I have discussed the case with Dr. Garcia who states Amiodarone bolus and he will see patient in the emergency department.    Social Determinants of Health:    Patient has presented with family members who are responsible, reliable and will ensure follow up care.      Disposition and Care Coordination:    Admit:   Through independent evaluation of the patient's history, physical, and imperical data, the patient meets criteria for observation/admission to the hospital.        Final diagnoses:    Atrial fibrillation with rapid ventricular response   Septic shock   Pneumonia of both lower lobes due to infectious organism   Hypoxia   Diabetic ketoacidosis without coma associated with other specified diabetes mellitus   Acute respiratory failure with hypoxia        ED Disposition     ED Disposition   Decision to Admit    Condition   --    Comment   Level of Care: Critical Care [6]   Diagnosis: Sepsis [7112087]   Isolate for COVID?: No [0]   Certification: I Certify That Inpatient Hospital Services Are Medically Necessary For Greater Than 2 Midnights               This medical record created using voice recognition software.        Documentation assistance provided by Caroline Tang acting as scribe for Jordan Davis MD. Information recorded by the scribe was done at my direction and has been verified and validated by me.          Caroline Tang  04/19/23 0626       Caroline Tang  04/19/23 0645       Caroline Tang  04/19/23 0826       Caroline Tang  04/19/23 0926       Lázaro Hugo MD  04/19/23 5301

## 2023-04-19 NOTE — CONSULTS
[facility]   Consult Note    Patient Name: Cristi Maloney  : 1940  MRN: [id number]  Primary Care Physician:  Gonzalo Nugent MD  Referring Physician: No ref. provider found  Date of admission: 2023    Consults  Subjective   Subjective     Reason for Consult/ Chief Complaint: Critical care management    History of Present Illness  Cristi Maloney is a 82 y.o. male critically ill in the ICU  Recently had shoulder surgery on 4/10/2023  Found to be altered and ill-appearing today  Had refractory atrial fibrillation and hypoxia in the ER  Has multifocal pneumonia  Received several rounds of cardioversion    Review of Systems   Unable to obtain due to mental status  Personal History     Past Medical History:   Diagnosis Date   • CHF (congestive heart failure)    • Elevated cholesterol    • Hypertension        Past Surgical History:   Procedure Laterality Date   • JOINT REPLACEMENT         Family History: family history is not on file. Otherwise pertinent FHx was reviewed and not pertinent to current issue.    Social History:  reports that he quit smoking about 4 years ago. His smoking use included cigarettes. He has a 15.00 pack-year smoking history. He has been exposed to tobacco smoke. He has never used smokeless tobacco. He reports that he does not drink alcohol and does not use drugs.    Home Medications:   ascorbic acid, atenolol, clopidogrel, glipizide, metFORMIN, montelukast, multivitamin with minerals, ondansetron, oxyCODONE-acetaminophen, pioglitazone, tamsulosin, and tiZANidine    Allergies:  No Known Allergies    Objective    Objective     Vitals:  Temp:  [97.8 °F (36.6 °C)-100 °F (37.8 °C)] 97.8 °F (36.6 °C)  Heart Rate:  [113-192] 113  Resp:  [22-34] 22  BP: ()/() 125/65  Flow (L/min):  [15-60] 60    Physical Exam  Acutely ill male on Airvo  He is somnolent  Scattered crackles  Tachycardic and regular  Abdomen is soft  Result Review    Result Review:  I have personally reviewed the  results from the time of this admission to 4/19/2023 14:37 EDT and agree with these findings:  [x]  Laboratory  [x]  Microbiology  [x]  Radiology  [x]  EKG/Telemetry   [x]  Cardiology/Vascular   []  Pathology  [x]  Old records  []  Other:    Most notable findings include: CT scan of the chest showing multifocal pneumonia    Assessment & Plan   Assessment / Plan     Brief Patient Summary:  Cristi Maloney is a 82 y.o. male who critically ill in the intensive care unit with respiratory failure due to multifocal pneumonia, sepsis and rapid A-fib with DKA    Active Hospital Problems:  Active Hospital Problems    Diagnosis    • **Sepsis    Rapid atrial fibrillation with RVR  Multifocal pneumonia concern for pneumococcal pneumonia  Acute hypoxic respiratory failure  Diabetic ketoacidosis  Acute kidney injury likely prerenal  Anion gap metabolic acidosis  Primary hyperhyroidism/thyrotoxicosis  Hypophosphatemia  Hypokalemia  Altered mental status likely metabolic encephalopathy from sepsis  COPD with centrilobular emphysema  Plan:   We will start patient on Airvo  Continue broad-spectrum antibiotic  Once results of blood cultures have returned we can de-escalate to ceftriaxone and Zithromax  Given the possibility of thyrotoxicosis we will discontinue amiodarone  Diltiazem for control of A-fib  With as needed doses of metoprolol  We will check T3  We will also repeat T4 level of T4 persistently elevated and T3 elevated we will start patient on PTU and then transition to methimazole  Discontinue amiodarone at this time given the iodine containing medication can worsen thyrotoxicosis  Insulin drip for the patient is diabetic ketoacidosis  Hold off on methimazole now after repeating thyroid function  Replace magnesium with IV mag  Replace potassium and phosphorus with IV phosphorus  Monitor urine renal function given improving JUNIOR    Patient is critically ill in ICU with  sepsis, rapid atrial fibrillation, multifocal pneumonia,  acute hypoxic respiratory failure, acute kidney injury, thyrotoxicosis  I spent 44 minutes of critical care time excluding any procedure notes, spent in review, analysis, obtaining history and physical, formulating care plan, and I led multi-disciplinary critical care rounds with bedside nurse, respiratory therapist, clinical pharmacist and other allied services. I have discussed the case with primary service and other consultants as well.        Electronically signed by Maurice Meier DO, 04/19/23, 2:37 PM EDT.

## 2023-04-20 LAB
ALBUMIN SERPL-MCNC: 2.4 G/DL (ref 3.5–5.2)
ALBUMIN/GLOB SERPL: 0.9 G/DL
ALP SERPL-CCNC: 46 U/L (ref 39–117)
ALT SERPL W P-5'-P-CCNC: 10 U/L (ref 1–41)
ANION GAP SERPL CALCULATED.3IONS-SCNC: 6.9 MMOL/L (ref 5–15)
ANION GAP SERPL CALCULATED.3IONS-SCNC: 8.3 MMOL/L (ref 5–15)
ANION GAP SERPL CALCULATED.3IONS-SCNC: 8.6 MMOL/L (ref 5–15)
ANION GAP SERPL CALCULATED.3IONS-SCNC: 8.8 MMOL/L (ref 5–15)
ANION GAP SERPL CALCULATED.3IONS-SCNC: 9.1 MMOL/L (ref 5–15)
ANION GAP SERPL CALCULATED.3IONS-SCNC: 9.1 MMOL/L (ref 5–15)
ANION GAP SERPL CALCULATED.3IONS-SCNC: 9.4 MMOL/L (ref 5–15)
AORTIC DIMENSIONLESS INDEX: 0.54 (DI)
AST SERPL-CCNC: 19 U/L (ref 1–40)
BH CV ECHO MEAS - AO MAX PG: 21 MMHG
BH CV ECHO MEAS - AO MEAN PG: 13 MMHG
BH CV ECHO MEAS - AO ROOT DIAM: 3 CM
BH CV ECHO MEAS - AO V2 MAX: 227 CM/SEC
BH CV ECHO MEAS - AO V2 VTI: 37.9 CM
BH CV ECHO MEAS - AVA(I,D): 1.7 CM2
BH CV ECHO MEAS - EDV(MOD-SP2): 71 ML
BH CV ECHO MEAS - EDV(MOD-SP4): 67 ML
BH CV ECHO MEAS - EF(MOD-BP): 43 %
BH CV ECHO MEAS - ESV(MOD-SP2): 35 ML
BH CV ECHO MEAS - ESV(MOD-SP4): 44 ML
BH CV ECHO MEAS - IVSD: 0.9 CM
BH CV ECHO MEAS - LA DIMENSION: 3.2 CM
BH CV ECHO MEAS - LAT PEAK E' VEL: 5.6 CM/SEC
BH CV ECHO MEAS - LV MAX PG: 6 MMHG
BH CV ECHO MEAS - LV MEAN PG: 3 MMHG
BH CV ECHO MEAS - LV V1 MAX: 124 CM/SEC
BH CV ECHO MEAS - LV V1 VTI: 20.3 CM
BH CV ECHO MEAS - LVIDD: 5.1 CM
BH CV ECHO MEAS - LVIDS: 3.7 CM
BH CV ECHO MEAS - LVOT DIAM: 2 CM
BH CV ECHO MEAS - LVPWD: 0.9 CM
BH CV ECHO MEAS - MED PEAK E' VEL: 6.5 CM/SEC
BH CV ECHO MEAS - MV A MAX VEL: 107 CM/SEC
BH CV ECHO MEAS - MV DEC TIME: 164 MSEC
BH CV ECHO MEAS - MV E MAX VEL: 93 CM/SEC
BH CV ECHO MEAS - MV E/A: 0.9
BH CV ECHO MEASUREMENTS AVERAGE E/E' RATIO: 15.37
BILIRUB SERPL-MCNC: 0.7 MG/DL (ref 0–1.2)
BUN SERPL-MCNC: 18 MG/DL (ref 8–23)
BUN SERPL-MCNC: 18 MG/DL (ref 8–23)
BUN SERPL-MCNC: 21 MG/DL (ref 8–23)
BUN SERPL-MCNC: 24 MG/DL (ref 8–23)
BUN SERPL-MCNC: 27 MG/DL (ref 8–23)
BUN SERPL-MCNC: 28 MG/DL (ref 8–23)
BUN SERPL-MCNC: 28 MG/DL (ref 8–23)
BUN/CREAT SERPL: 29 (ref 7–25)
BUN/CREAT SERPL: 29.5 (ref 7–25)
BUN/CREAT SERPL: 33.8 (ref 7–25)
BUN/CREAT SERPL: 33.9 (ref 7–25)
BUN/CREAT SERPL: 34.1 (ref 7–25)
BUN/CREAT SERPL: 34.1 (ref 7–25)
BUN/CREAT SERPL: 34.8 (ref 7–25)
CALCIUM SPEC-SCNC: 8.2 MG/DL (ref 8.6–10.5)
CALCIUM SPEC-SCNC: 8.4 MG/DL (ref 8.6–10.5)
CALCIUM SPEC-SCNC: 8.5 MG/DL (ref 8.6–10.5)
CHLORIDE SERPL-SCNC: 114 MMOL/L (ref 98–107)
CHLORIDE SERPL-SCNC: 116 MMOL/L (ref 98–107)
CHLORIDE SERPL-SCNC: 116 MMOL/L (ref 98–107)
CHLORIDE SERPL-SCNC: 117 MMOL/L (ref 98–107)
CHLORIDE SERPL-SCNC: 118 MMOL/L (ref 98–107)
CO2 SERPL-SCNC: 15.2 MMOL/L (ref 22–29)
CO2 SERPL-SCNC: 16.6 MMOL/L (ref 22–29)
CO2 SERPL-SCNC: 16.9 MMOL/L (ref 22–29)
CO2 SERPL-SCNC: 16.9 MMOL/L (ref 22–29)
CO2 SERPL-SCNC: 17.1 MMOL/L (ref 22–29)
CO2 SERPL-SCNC: 17.4 MMOL/L (ref 22–29)
CO2 SERPL-SCNC: 18.7 MMOL/L (ref 22–29)
CREAT SERPL-MCNC: 0.61 MG/DL (ref 0.76–1.27)
CREAT SERPL-MCNC: 0.62 MG/DL (ref 0.76–1.27)
CREAT SERPL-MCNC: 0.62 MG/DL (ref 0.76–1.27)
CREAT SERPL-MCNC: 0.69 MG/DL (ref 0.76–1.27)
CREAT SERPL-MCNC: 0.8 MG/DL (ref 0.76–1.27)
CREAT SERPL-MCNC: 0.82 MG/DL (ref 0.76–1.27)
CREAT SERPL-MCNC: 0.82 MG/DL (ref 0.76–1.27)
EGFRCR SERPLBLD CKD-EPI 2021: 88.3 ML/MIN/1.73
EGFRCR SERPLBLD CKD-EPI 2021: 88.3 ML/MIN/1.73
EGFRCR SERPLBLD CKD-EPI 2021: 88.9 ML/MIN/1.73
EGFRCR SERPLBLD CKD-EPI 2021: 93 ML/MIN/1.73
EGFRCR SERPLBLD CKD-EPI 2021: 96 ML/MIN/1.73
EGFRCR SERPLBLD CKD-EPI 2021: 96 ML/MIN/1.73
EGFRCR SERPLBLD CKD-EPI 2021: 96.5 ML/MIN/1.73
GLOBULIN UR ELPH-MCNC: 2.6 GM/DL
GLUCOSE BLDC GLUCOMTR-MCNC: 117 MG/DL (ref 70–99)
GLUCOSE BLDC GLUCOMTR-MCNC: 118 MG/DL (ref 70–99)
GLUCOSE BLDC GLUCOMTR-MCNC: 124 MG/DL (ref 70–99)
GLUCOSE BLDC GLUCOMTR-MCNC: 125 MG/DL (ref 70–99)
GLUCOSE BLDC GLUCOMTR-MCNC: 126 MG/DL (ref 70–99)
GLUCOSE BLDC GLUCOMTR-MCNC: 130 MG/DL (ref 70–99)
GLUCOSE BLDC GLUCOMTR-MCNC: 132 MG/DL (ref 70–99)
GLUCOSE BLDC GLUCOMTR-MCNC: 132 MG/DL (ref 70–99)
GLUCOSE BLDC GLUCOMTR-MCNC: 134 MG/DL (ref 70–99)
GLUCOSE BLDC GLUCOMTR-MCNC: 140 MG/DL (ref 70–99)
GLUCOSE BLDC GLUCOMTR-MCNC: 142 MG/DL (ref 70–99)
GLUCOSE BLDC GLUCOMTR-MCNC: 146 MG/DL (ref 70–99)
GLUCOSE BLDC GLUCOMTR-MCNC: 147 MG/DL (ref 70–99)
GLUCOSE BLDC GLUCOMTR-MCNC: 158 MG/DL (ref 70–99)
GLUCOSE BLDC GLUCOMTR-MCNC: 159 MG/DL (ref 70–99)
GLUCOSE BLDC GLUCOMTR-MCNC: 161 MG/DL (ref 70–99)
GLUCOSE BLDC GLUCOMTR-MCNC: 170 MG/DL (ref 70–99)
GLUCOSE BLDC GLUCOMTR-MCNC: 174 MG/DL (ref 70–99)
GLUCOSE SERPL-MCNC: 127 MG/DL (ref 65–99)
GLUCOSE SERPL-MCNC: 135 MG/DL (ref 65–99)
GLUCOSE SERPL-MCNC: 135 MG/DL (ref 65–99)
GLUCOSE SERPL-MCNC: 164 MG/DL (ref 65–99)
GLUCOSE SERPL-MCNC: 182 MG/DL (ref 65–99)
GLUCOSE SERPL-MCNC: 196 MG/DL (ref 65–99)
GLUCOSE SERPL-MCNC: 196 MG/DL (ref 65–99)
IVRT: 85 MSEC
LEFT ATRIUM VOLUME INDEX: 22 ML/M2
MAGNESIUM SERPL-MCNC: 1.8 MG/DL (ref 1.6–2.4)
MAGNESIUM SERPL-MCNC: 1.9 MG/DL (ref 1.6–2.4)
MAGNESIUM SERPL-MCNC: 1.9 MG/DL (ref 1.6–2.4)
MAGNESIUM SERPL-MCNC: 2.2 MG/DL (ref 1.6–2.4)
MAGNESIUM SERPL-MCNC: 2.3 MG/DL (ref 1.6–2.4)
MAGNESIUM SERPL-MCNC: 2.4 MG/DL (ref 1.6–2.4)
MAXIMAL PREDICTED HEART RATE: 139 BPM
PHOSPHATE SERPL-MCNC: 1.3 MG/DL (ref 2.5–4.5)
PHOSPHATE SERPL-MCNC: 1.5 MG/DL (ref 2.5–4.5)
PHOSPHATE SERPL-MCNC: 1.8 MG/DL (ref 2.5–4.5)
PHOSPHATE SERPL-MCNC: 2.2 MG/DL (ref 2.5–4.5)
PHOSPHATE SERPL-MCNC: 2.3 MG/DL (ref 2.5–4.5)
PHOSPHATE SERPL-MCNC: 2.6 MG/DL (ref 2.5–4.5)
POTASSIUM SERPL-SCNC: 3.9 MMOL/L (ref 3.5–5.2)
POTASSIUM SERPL-SCNC: 4 MMOL/L (ref 3.5–5.2)
POTASSIUM SERPL-SCNC: 4.1 MMOL/L (ref 3.5–5.2)
POTASSIUM SERPL-SCNC: 4.2 MMOL/L (ref 3.5–5.2)
POTASSIUM SERPL-SCNC: 4.3 MMOL/L (ref 3.5–5.2)
POTASSIUM SERPL-SCNC: 4.6 MMOL/L (ref 3.5–5.2)
POTASSIUM SERPL-SCNC: 4.6 MMOL/L (ref 3.5–5.2)
PROT SERPL-MCNC: 5 G/DL (ref 6–8.5)
SODIUM SERPL-SCNC: 140 MMOL/L (ref 136–145)
SODIUM SERPL-SCNC: 141 MMOL/L (ref 136–145)
SODIUM SERPL-SCNC: 141 MMOL/L (ref 136–145)
SODIUM SERPL-SCNC: 142 MMOL/L (ref 136–145)
SODIUM SERPL-SCNC: 144 MMOL/L (ref 136–145)
STRESS TARGET HR: 118 BPM

## 2023-04-20 PROCEDURE — 80048 BASIC METABOLIC PNL TOTAL CA: CPT | Performed by: INTERNAL MEDICINE

## 2023-04-20 PROCEDURE — 25010000002 CEFTRIAXONE PER 250 MG: Performed by: INTERNAL MEDICINE

## 2023-04-20 PROCEDURE — 0 MAGNESIUM SULFATE 4 GM/100ML SOLUTION: Performed by: PHYSICIAN ASSISTANT

## 2023-04-20 PROCEDURE — 94664 DEMO&/EVAL PT USE INHALER: CPT

## 2023-04-20 PROCEDURE — 80076 HEPATIC FUNCTION PANEL: CPT | Performed by: FAMILY MEDICINE

## 2023-04-20 PROCEDURE — 92610 EVALUATE SWALLOWING FUNCTION: CPT

## 2023-04-20 PROCEDURE — 99291 CRITICAL CARE FIRST HOUR: CPT | Performed by: INTERNAL MEDICINE

## 2023-04-20 PROCEDURE — 84100 ASSAY OF PHOSPHORUS: CPT | Performed by: INTERNAL MEDICINE

## 2023-04-20 PROCEDURE — 0 ACETAMINOPHEN 10 MG/ML SOLUTION: Performed by: PHYSICIAN ASSISTANT

## 2023-04-20 PROCEDURE — 94799 UNLISTED PULMONARY SVC/PX: CPT

## 2023-04-20 PROCEDURE — 0 ACETAMINOPHEN 10 MG/ML SOLUTION: Performed by: STUDENT IN AN ORGANIZED HEALTH CARE EDUCATION/TRAINING PROGRAM

## 2023-04-20 PROCEDURE — 83735 ASSAY OF MAGNESIUM: CPT | Performed by: INTERNAL MEDICINE

## 2023-04-20 PROCEDURE — 25010000002 ENOXAPARIN PER 10 MG: Performed by: INTERNAL MEDICINE

## 2023-04-20 PROCEDURE — 99024 POSTOP FOLLOW-UP VISIT: CPT | Performed by: INTERNAL MEDICINE

## 2023-04-20 PROCEDURE — 82962 GLUCOSE BLOOD TEST: CPT

## 2023-04-20 PROCEDURE — 25010000002 CEFEPIME PER 500 MG: Performed by: INTERNAL MEDICINE

## 2023-04-20 PROCEDURE — 25010000002 AZITHROMYCIN PER 500 MG: Performed by: INTERNAL MEDICINE

## 2023-04-20 PROCEDURE — 94761 N-INVAS EAR/PLS OXIMETRY MLT: CPT

## 2023-04-20 PROCEDURE — 99233 SBSQ HOSP IP/OBS HIGH 50: CPT | Performed by: FAMILY MEDICINE

## 2023-04-20 PROCEDURE — 84439 ASSAY OF FREE THYROXINE: CPT | Performed by: PHYSICIAN ASSISTANT

## 2023-04-20 RX ORDER — LEVALBUTEROL INHALATION SOLUTION 1.25 MG/3ML
1.25 SOLUTION RESPIRATORY (INHALATION)
Status: DISCONTINUED | OUTPATIENT
Start: 2023-04-20 | End: 2023-04-26 | Stop reason: HOSPADM

## 2023-04-20 RX ORDER — SODIUM PHOSPHATE IN D5W 15MMOL/250
15 PLASTIC BAG, INJECTION (ML) INTRAVENOUS
Status: COMPLETED | OUTPATIENT
Start: 2023-04-20 | End: 2023-04-20

## 2023-04-20 RX ORDER — SODIUM CHLORIDE FOR INHALATION 3 %
4 VIAL, NEBULIZER (ML) INHALATION
Status: DISCONTINUED | OUTPATIENT
Start: 2023-04-20 | End: 2023-04-24 | Stop reason: SDUPTHER

## 2023-04-20 RX ORDER — FENTANYL/ROPIVACAINE/NS/PF 2-625MCG/1
15 PLASTIC BAG, INJECTION (ML) EPIDURAL ONCE
Status: COMPLETED | OUTPATIENT
Start: 2023-04-20 | End: 2023-04-20

## 2023-04-20 RX ORDER — MAGNESIUM SULFATE HEPTAHYDRATE 40 MG/ML
4 INJECTION, SOLUTION INTRAVENOUS ONCE
Status: DISCONTINUED | OUTPATIENT
Start: 2023-04-20 | End: 2023-04-20

## 2023-04-20 RX ORDER — CEFTRIAXONE SODIUM 1 G/50ML
1 INJECTION, SOLUTION INTRAVENOUS EVERY 24 HOURS
Status: COMPLETED | OUTPATIENT
Start: 2023-04-20 | End: 2023-04-26

## 2023-04-20 RX ORDER — ACETAMINOPHEN 10 MG/ML
1000 INJECTION, SOLUTION INTRAVENOUS ONCE
Status: COMPLETED | OUTPATIENT
Start: 2023-04-20 | End: 2023-04-20

## 2023-04-20 RX ORDER — ENOXAPARIN SODIUM 100 MG/ML
40 INJECTION SUBCUTANEOUS EVERY 24 HOURS
Status: DISCONTINUED | OUTPATIENT
Start: 2023-04-20 | End: 2023-04-26 | Stop reason: HOSPADM

## 2023-04-20 RX ORDER — METHIMAZOLE 5 MG/1
5 TABLET ORAL EVERY 8 HOURS SCHEDULED
Status: DISCONTINUED | OUTPATIENT
Start: 2023-04-20 | End: 2023-04-22

## 2023-04-20 RX ORDER — TAMSULOSIN HYDROCHLORIDE 0.4 MG/1
0.4 CAPSULE ORAL DAILY
Status: DISCONTINUED | OUTPATIENT
Start: 2023-04-20 | End: 2023-04-26 | Stop reason: HOSPADM

## 2023-04-20 RX ORDER — CLOPIDOGREL BISULFATE 75 MG/1
75 TABLET ORAL DAILY
Status: DISCONTINUED | OUTPATIENT
Start: 2023-04-20 | End: 2023-04-26 | Stop reason: HOSPADM

## 2023-04-20 RX ORDER — MONTELUKAST SODIUM 10 MG/1
10 TABLET ORAL NIGHTLY
Status: DISCONTINUED | OUTPATIENT
Start: 2023-04-20 | End: 2023-04-26 | Stop reason: HOSPADM

## 2023-04-20 RX ORDER — MONTELUKAST SODIUM 10 MG/1
10 TABLET ORAL DAILY
Status: DISCONTINUED | OUTPATIENT
Start: 2023-04-20 | End: 2023-04-20

## 2023-04-20 RX ORDER — MAGNESIUM SULFATE HEPTAHYDRATE 40 MG/ML
4 INJECTION, SOLUTION INTRAVENOUS ONCE
Status: COMPLETED | OUTPATIENT
Start: 2023-04-20 | End: 2023-04-20

## 2023-04-20 RX ORDER — SODIUM PHOSPHATE IN D5W 15MMOL/250
15 PLASTIC BAG, INJECTION (ML) INTRAVENOUS ONCE
Status: COMPLETED | OUTPATIENT
Start: 2023-04-20 | End: 2023-04-20

## 2023-04-20 RX ADMIN — ACETAMINOPHEN 1000 MG: 10 INJECTION INTRAVENOUS at 16:21

## 2023-04-20 RX ADMIN — ENOXAPARIN SODIUM 40 MG: 100 INJECTION SUBCUTANEOUS at 10:35

## 2023-04-20 RX ADMIN — MONTELUKAST 10 MG: 10 TABLET, FILM COATED ORAL at 21:53

## 2023-04-20 RX ADMIN — POTASSIUM CHLORIDE, DEXTROSE MONOHYDRATE AND SODIUM CHLORIDE 150 ML/HR: 150; 5; 450 INJECTION, SOLUTION INTRAVENOUS at 17:47

## 2023-04-20 RX ADMIN — METHIMAZOLE 5 MG: 5 TABLET ORAL at 21:53

## 2023-04-20 RX ADMIN — Medication 10 ML: at 08:05

## 2023-04-20 RX ADMIN — POTASSIUM CHLORIDE, DEXTROSE MONOHYDRATE AND SODIUM CHLORIDE 150 ML/HR: 150; 5; 450 INJECTION, SOLUTION INTRAVENOUS at 10:35

## 2023-04-20 RX ADMIN — IPRATROPIUM BROMIDE AND ALBUTEROL SULFATE 3 ML: 2.5; .5 SOLUTION RESPIRATORY (INHALATION) at 06:25

## 2023-04-20 RX ADMIN — Medication 10 ML: at 21:53

## 2023-04-20 RX ADMIN — TAMSULOSIN HYDROCHLORIDE 0.4 MG: 0.4 CAPSULE ORAL at 17:47

## 2023-04-20 RX ADMIN — DILTIAZEM HYDROCHLORIDE 20 MG/HR: 5 INJECTION INTRAVENOUS at 15:29

## 2023-04-20 RX ADMIN — SODIUM PHOSPHATE, MONOBASIC, MONOHYDRATE 15 MMOL: 276; 142 INJECTION, SOLUTION INTRAVENOUS at 19:49

## 2023-04-20 RX ADMIN — METOPROLOL TARTRATE 5 MG: 5 INJECTION INTRAVENOUS at 08:51

## 2023-04-20 RX ADMIN — CEFEPIME 2 G: 2 INJECTION, POWDER, FOR SOLUTION INTRAVENOUS at 06:32

## 2023-04-20 RX ADMIN — LEVALBUTEROL HYDROCHLORIDE 1.25 MG: 1.25 SOLUTION RESPIRATORY (INHALATION) at 19:25

## 2023-04-20 RX ADMIN — AZITHROMYCIN DIHYDRATE 500 MG: 500 INJECTION, POWDER, LYOPHILIZED, FOR SOLUTION INTRAVENOUS at 10:35

## 2023-04-20 RX ADMIN — DILTIAZEM HYDROCHLORIDE 30 MG: 30 TABLET, FILM COATED ORAL at 23:49

## 2023-04-20 RX ADMIN — Medication 15 MMOL: at 09:25

## 2023-04-20 RX ADMIN — ACETAMINOPHEN 1000 MG: 10 INJECTION INTRAVENOUS at 00:08

## 2023-04-20 RX ADMIN — SODIUM PHOSPHATE, MONOBASIC, MONOHYDRATE 15 MMOL: 276; 142 INJECTION, SOLUTION INTRAVENOUS at 16:21

## 2023-04-20 RX ADMIN — CLOPIDOGREL BISULFATE 75 MG: 75 TABLET ORAL at 10:35

## 2023-04-20 RX ADMIN — CEFTRIAXONE SODIUM 1 G: 1 INJECTION, SOLUTION INTRAVENOUS at 10:35

## 2023-04-20 RX ADMIN — POTASSIUM CHLORIDE, DEXTROSE MONOHYDRATE AND SODIUM CHLORIDE 150 ML/HR: 150; 5; 450 INJECTION, SOLUTION INTRAVENOUS at 03:20

## 2023-04-20 RX ADMIN — Medication 10 ML: at 21:54

## 2023-04-20 RX ADMIN — DILTIAZEM HYDROCHLORIDE 15 MG/HR: 5 INJECTION INTRAVENOUS at 09:15

## 2023-04-20 RX ADMIN — Medication 4 ML: at 06:25

## 2023-04-20 RX ADMIN — POTASSIUM PHOSPHATE, MONOBASIC AND POTASSIUM PHOSPHATE, DIBASIC 15 MMOL: 224; 236 INJECTION, SOLUTION, CONCENTRATE INTRAVENOUS at 09:09

## 2023-04-20 RX ADMIN — INSULIN HUMAN 2.3 UNITS/HR: 1 INJECTION, SOLUTION INTRAVENOUS at 07:51

## 2023-04-20 RX ADMIN — Medication 4 ML: at 19:25

## 2023-04-20 RX ADMIN — LEVALBUTEROL HYDROCHLORIDE 1.25 MG: 1.25 SOLUTION RESPIRATORY (INHALATION) at 13:00

## 2023-04-20 RX ADMIN — METHIMAZOLE 5 MG: 5 TABLET ORAL at 15:28

## 2023-04-20 RX ADMIN — MAGNESIUM SULFATE HEPTAHYDRATE 4 G: 4 INJECTION, SOLUTION INTRAVENOUS at 09:08

## 2023-04-20 RX ADMIN — DILTIAZEM HYDROCHLORIDE 30 MG: 30 TABLET, FILM COATED ORAL at 17:47

## 2023-04-20 NOTE — PLAN OF CARE
Goal Outcome Evaluation:               Patient put on BiPAP 14/7 FIO2 70% around midnight and wore throughout the night.

## 2023-04-20 NOTE — PROGRESS NOTES
[facility]   Hospitalist Progress Note  Date: 2023  Patient Name: Cristi Maloney  : 1940  MRN: [id number]  Date of admission: 2023      Subjective   Subjective     Chief complaint: Altered mental status, shortness of breath    Summary:  82-year-old male with history of diabetes, ex-smoker, with recent left shoulder surgery, was found down by his wife, short of air, tachycardic, tachypneic, found to be in DKA as well as A-fib with RVR, bilateral pneumonia, placed on BiPAP, hypotension, admitted to the ICU, critical care consulted, cardiology consulted, failed several rounds of cardioversion, concern for thyrotoxicosis, primary hypothyroidism, started on PTU with transition to methimazole.  Strep pneumo antigen positive    Interval follow-up: Patient seen and examined, was wearing BiPAP, no acute distress, no acute major overnight events, FiO2 in the 40s, sats are in the 90s, still on Cardizem continuous infusion and continuous insulin infusion, he was thirsty.  His heart rate was still bouncing around from the 100s to 180s.  Still has a metabolic acidosis, creatinine 0.62, potassium 4.2, sodium 142, phosphorus low at 1.5, replacement phosphorus has been ordered.    Review of systems:  All systems reviewed and negative except for generalized fatigue, thirst, shortness of breath with exertion    Objective   Objective     Vitals:   Temp:  [98.7 °F (37.1 °C)-101 °F (38.3 °C)] 99.8 °F (37.7 °C)  Heart Rate:  [] 104  Resp:  [15-29] 20  BP: (110-162)/() 126/60  Flow (L/min):  [40-60] 40  Physical Exam    Constitutional: Alert, awake, oriented, answering questions with BiPAP mask on              Eyes: Pupils equal, sclerae anicteric              HENT: NCAT, mucous membranes dry              Neck: Supple, no thyromegaly              Respiratory: Air entry bilaterally, coarse breath sounds throughout, scattered rhonchi              Cardiovascular: Tachycardic rate, regular rhythm               Gastrointestinal: Positive bowel sounds, soft, nontender, nondistended              Musculoskeletal: left shoulder incision and is in immobilizer              Psychiatric: Pleasant mood and affect              Neurologic: Alert and awake and oriented, cranial nerves II through XII grossly intact to confrontation              Skin: No rashes    Result Review    Result Review:  I have personally reviewed the pertinent results from the past 24 hours to 4/20/2023 15:55 EDT and agree with these findings:  [x]  Laboratory   CBC        11/10/2022    13:34 3/22/2023    11:12 4/19/2023    06:30 4/19/2023    10:10 4/19/2023    23:31   CBC   WBC 7.32      6.77      17.84   16.05   16.77     RBC 4.39      4.41      4.48   4.04   3.29     Hemoglobin 13.8      13.7      14.2   12.9   10.6     Hematocrit 43.2      43.8      42.7   38.4   30.4     MCV 98.4      99.3      95.3   95.0   92.4     MCH 31.4      31.1      31.7   31.9   32.2     MCHC 31.9      31.3      33.3   33.6   34.9     RDW 13.3      13.9      13.8   13.8   14.1     Platelets 208      192      330   299   215         This result is from an external source.     BMP        6/23/2022    10:32 4/19/2023    06:30 4/19/2023    06:33 4/19/2023    11:14 4/19/2023    13:00   BMP   Glucose 162            BUN 16      39    35   34     Creatinine 0.92      1.45    1.21   1.11     Sodium 138      137   139.3   143   143     Potassium 4.2      3.2    2.9   3.0     Chloride 101      100    111   113     CO2 25      13.4    15.4   14.9     Calcium 9.8      9.7    8.7   9.0             4/19/2023    19:37 4/19/2023    23:31 4/20/2023    03:21 4/20/2023    07:52 4/20/2023    12:40   BMP   Glucose        BUN 30   28      28   27   24   21     Creatinine 0.94   0.82      0.82   0.80   0.69   0.62     Sodium 144   144      144   144   141   142     Potassium 4.5   4.6      4.6   4.3   4.0   4.2     Chloride 117   118      118   118   117   116     CO2 16.8   16.9      16.9   17.4   15.2    16.6     Calcium 8.9   8.4      8.4   8.4   8.5   8.5         This result is from an external source.     LIVER FUNCTION TESTS:      Lab 04/19/23  2331 04/19/23  0630   TOTAL PROTEIN 5.0* 6.9   ALBUMIN 2.4* 3.3*   GLOBULIN 2.6 3.6   ALT (SGPT) 10 11   AST (SGOT) 19 10   BILIRUBIN 0.7 1.3*   ALK PHOS 46 68       [x]  Microbiology   Blood Culture   Date Value Ref Range Status   04/19/2023 No growth at 24 hours  Preliminary   04/19/2023 No growth at 24 hours  Preliminary     No results found for: BCIDPCR, CXREFLEX, CSFCX, CULTURETIS  No results found for: CULTURES, HSVCX, URCX  No results found for: EYECULTURE, GCCX, HSVCULTURE, LABHSV  No results found for: LEGIONELLA, MRSACX, MUMPSCX, MYCOPLASCX  No results found for: NOCARDIACX, STOOLCX  No results found for: THROATCX, UNSTIMCULT, URINECX, CULTURE, VZVCULTUR  No results found for: VIRALCULTU, WOUNDCX    [x]  Radiology Adult Transthoracic Echo Complete W/ Cont if Necessary Per Protocol    Result Date: 4/20/2023  •  Technically difficult study. •  Left ventricular ejection fraction appears to be 51 - 55%.  No obvious regional wall motion abnormalities. •  Left ventricular diastolic function is consistent with (grade I) impaired relaxation and age. •  Mild aortic valve stenosis is present with max/mean pressure gradient 20/13 mmHg..     CT Chest With Contrast Diagnostic    Result Date: 4/19/2023  PROCEDURE: CT CHEST W CONTRAST DIAGNOSTIC  COMPARISON:  None INDICATIONS: shortness of breath  TECHNIQUE: After obtaining the patient's consent, CT images were obtained with non-ionic intravenous contrast material.   PROTOCOL:   Pulmonary embolism imaging protocol performed    RADIATION:   DLP: 525.7mGy*cm   Automated exposure control was utilized to minimize radiation dose. CONTRAST: 100cc Isovue 370 I.V.  FINDINGS:  There is advanced emphysematous lung disease.  There is airspace disease dependently in both lower lobes.  No nodules or masses are identified.  There is  atherosclerotic disease in the aorta and coronary arteries.  There is no mediastinal lymphadenopathy.  There are enlarged left hilar nodes measuring 2.3 by 1.3 cm.  There are borderline enlarged right hilar nodes.  Densely calcified nodes are present in the left hilum.  No acute findings are present in the upper abdomen.  Degenerative changes are present in the dorsal spine without definite bone destruction.  There are postoperative changes of left shoulder arthroplasty.  The aorta is of normal caliber.  There is significant motion degradation in the lung bases.  There are no obvious filling defects within the main or proximal pulmonary arteries.        1. Significant motion artifact is present in the bases.  No central pulmonary emboli are identified.  2. Extensive consolidation in both lower lobes raising the question of aspiration. 3. Advanced emphysematous lung disease 4. Extensive coronary artery atherosclerotic calcifications. 5. Left hilar lymphadenopathy with a 2.3 cm node.  There are densely calcified nodes in the left hilum as well.  Smaller nodes are present within the right hilum.  There is no mediastinal lymphadenopathy. 6. Postop changes of recent left shoulder arthroplasty.     Jordan Marrero MD       Electronically Signed and Approved By: Jordan Marrero MD on 4/19/2023 at 7:46             XR Chest 1 View    Result Date: 4/19/2023  PROCEDURE: XR CHEST 1 VW  COMPARISON: [facility], , CHEST AP/PA 1 VIEW, 12/03/2013, 16:00.  INDICATIONS: Dysrhythmia  FINDINGS:  Infiltrates are noted in the lung bases.  The cardiac silhouette and mediastinum are stable.  Aortic atherosclerosis is noted.  No acute osseous abnormalities are observed.        1. Bibasilar infiltrates are noted.  The findings may relate to bibasilar pneumonia.  Changes of CHF and pulmonary edema could also have this appearance.       ANAYA FELDMAN MD       Electronically Signed and Approved By: ANAYA FELDMAN MD on 4/19/2023  at 6:46             XR Shoulder 1 Vw LT    Result Date: 4/10/2023  RE[facility]ING YOUR TEST RESULTS [facility] IS NOT A SUBSTITUTE FOR DISCUSSING THOSE RESULTS WITH YOUR HEALTH CARE PROVIDER. PLEASE CONTACT YOUR PROVIDER VIA Virtual 3-D Display for Smartphones TO DISCUSS ANY QUESTIONS OR CONCERNS YOU MAY HAVE REGARDING THESE TEST RESULTS.  RADIOLOGY REPORT FACILITY:  [facility] UNIT/AGE/GENDER: JEAN-PIERREPERIOP  OP      AGE:82 Y          SEX:M PATIENT NAME/:  DAPHNE BUSBY E    1940 UNIT NUMBER:  [id number] ACCOUNT NUMBER:  [id number] ACCESSION NUMBER:  [id number] PORTABLE AP LEFT SHOULDER 1328 hours DATE: 04/10/2023 COMPARISON: 2023 INDICATION: Status post shoulder joint replacement. IMPRESSION: Patient is status post reverse total left shoulder arthroplasty. Prosthesis is in expected position and alignment in this single view. Negative for periprosthetic fracture. Surgical drain in place. Dictated by: Marlo Winters M.D. Images and Report reviewed and interpreted by: Marlo Winters M.D. <PS><Electronically signed by: Marlo Winters M.D.> 04/10/2023 1349 D: 04/10/2023 1348 T: 04/10/2023 1348    XR Shoulder Comp Min 2 Vw LT    Result Date: 3/23/2023  This result has an attachment that is not available. Imaging    interpreted in note for office visit      [x]  EKG/Telemetry   []  Cardiology/Vascular   []  Pathology  [x]  Old records  []  Other:    Assessment & Plan   Assessment / Plan     Assessment/Plan:    Assessment:  Atrial fibrillation rapid ventricular response  Multifocal atrial tachycardia  Multifocal pneumonia with concern for pneumococcal infection  Acute hypoxemic respiratory failure  Diabetic ketoacidosis  JUNIOR  Anion gap metabolic acidosis  Thyrotoxicosis/hyperthyroidism  Hypophosphatemia  Hypokalemia  Acute metabolic encephalopathy multifactorial likely related to sepsis  COPD  Dyslipidemia  Chronic diastolic CHF  Aortic valve stenosis    Plan:  Labs and imaging reviewed  Transition  BiPAP to heated high flow nasal cannula oxygen  Oral intake per tolerance  Continue Cardizem continuous infusion with intent to wean  Once he is able to tolerate oral intake with BiPAP mask off, start Cardizem 15 mg every 6 hours  Continue Plavix 75 mg daily  Cardiology recommendations appreciated  Discussed with critical care Dr. Meier, recommendations appreciated  Continue azithromycin to complete 3 days total  Continue ceftriaxone 1 g daily to complete 7 days total  Continue methimazole 5 mg every 8 hours  We will need TSH and free T4 reevaluated in the next few days  Replacement phosphorus  Continuous insulin infusion  Fluids per DKA protocol  Tamsulosin 0.5 mg daily  Continue telemetry monitoring  Echo reviewed  A.m. labs  Full code  DVT prophylaxis with Lovenox  Clinical course dictate further management of this critically ill patient in the intensive care unit  Discussed with nurse at the bedside  DVT prophylaxis:  Medical and mechanical DVT prophylaxis orders are present.    CODE STATUS:   Level Of Support Discussed With: Patient  Code Status (Patient has no pulse and is not breathing): CPR (Attempt to Resuscitate)  Medical Interventions (Patient has pulse or is breathing): Full Support  Release to patient: Routine Release        Electronically signed by Daphne Washington MD, 04/20/23, 3:55 PM EDT.    Portions of this documentation were transcribed electronically from a voice recognition software.  I confirm all data accurately represents the service(s) I performed at today's visit.

## 2023-04-20 NOTE — PLAN OF CARE
Goal Outcome Evaluation:      FUNCTIONAL ASSESSMENT INSTRUMENT: Patient currently scored a level 4 of 7 on Functional Communication Measures for swallowing indicating a 40-59% limitation in function.     ASSESSMENT/ PLAN OF CARE:  Pt presents with limitations, noted below, that impede his ability to swallow safely. The skills of a therapist will be required to safely and effectively implement the following treatment plan to restore maximal level of function.     PROBLEMS:  1.  Dysphagia               TREATMENT: Dysphagia therapy to ensure diet tolerance, advance to least restrictive diet and analyze for aspiration risk.     FREQUENCY/DURATION: Twice daily 5 times a week     REHAB POTENTIAL:  Pt has good rehab potential.  The following limitations may influence improvement/ length of tx medical status.     RECOMMENDATIONS:   1.   DIET: Limit intake to oral meds in applesauce and ice chips conservatively after good oral care.  Speech therapy will follow up and reassess in the a.m.  Hopeful patient will be able to advance diet in the a.m.     YES: Pt/responsible party agrees with plan of care and has been informed of all alternatives, risks and benefits.           EDUCATION  The patient has been educated in the following areas:   Dysphagia (Swallowing Impairment).

## 2023-04-20 NOTE — PROGRESS NOTES
Reason for consultation critical care management    Patient critically ill in the intensive care unit  Remains on continuous BiPAP  Has been tachycardic into 200s  Much more alert today  Still with difficulty swallowing        Vitals:    04/20/23 1000 04/20/23 1015 04/20/23 1030 04/20/23 1045   BP: 117/63 122/60 131/68 122/70   BP Location:       Patient Position:       Pulse: 101 102 106 107   Resp:       Temp:       TempSrc:       SpO2: 96% 96% 96% 95%   Weight:       Height:           Physical Exam  Acutely ill male on Bipap  He is somnolent  Scattered crackles  Tachycardic and regular with rate of > 200  Abdomen is soft      Brief Patient Summary:  Cristi Maloney is a 82 y.o. male who critically ill in the intensive care unit with respiratory failure due to multifocal pneumonia, sepsis and rapid A-fib with DKA     Active Hospital Problems:       Active Hospital Problems     Diagnosis     • **Sepsis     Rapid atrial fibrillation with RVR  Multifocal pneumonia concern for pneumococcal pneumonia  Acute hypoxic respiratory failure  Diabetic ketoacidosis  Acute kidney injury likely prerenal  Anion gap metabolic acidosis  Primary hyperhyroidism/thyrotoxicosis  Hypophosphatemia  Hypokalemia  Altered mental status likely metabolic encephalopathy from sepsis  COPD with centrilobular emphysema  Plan:   Transition off BiPAP to nasal cannula  Heart rate 200 the time my exam  Increased diltiazem drip back to 20  Patient has given a 5 mg IV bolus of metoprolol and then 10 mg of diltiazem with improvement heart rate  If patient is able to swallow today we will start Cardizem 15 every 6 hours cardiology recommendation however at this time speech is recommending n.p.o. still for at least another day  De-escalate antibiotics to ceftriaxone in Zithromax  Patient does have 2 lab values of elevated T4 in the setting of this refractory tachycardia and a low TSH we will start patient on methimazole 5 mg 3 times daily  At this time the  differential is primary hypothyroidism versus amiodarone induced thyrotoxicosis however this would be unlikely given that he just received amiodarone yesterday  Nonetheless we will repeat T4 in the morning  Continue insulin drip  Replace electrolytes with 4 g of IV magnesium  Replace phosphorus with IV runs       Patient is critically ill in ICU with  sepsis, rapid atrial fibrillation, multifocal pneumonia, acute hypoxic respiratory failure, acute kidney injury, thyrotoxicosis  I spent 40 minutes of critical care time excluding any procedure notes, spent in review, analysis, obtaining history and physical, formulating care plan, and I led multi-disciplinary critical care rounds with bedside nurse, respiratory therapist, clinical pharmacist and other allied services. I have discussed the case with primary service and other consultants as well.    Electronically signed by Maurice Meier DO, 04/20/23, 12:28 PM EDT.  '

## 2023-04-20 NOTE — PROGRESS NOTES
"[facility] Clinical Pharmacy Services: Vancomycin Pharmacokinetic Initial Consult Note    Cristi Maloney is a 82 y.o. male who is on day 1 of pharmacy to dose vancomycin for Sepsis.    Consult Information  Consulting Provider: Susan  Planned Duration of Therapy: 7 days  Was Patient Receiving Prior to Admission/Consult?: No  Loading Dose Ordered or Given: 1500 mg on 4/ at 1500  PK/PD Target: -600 mg/L.hr   Relevant ID History:  surgery past 90 days  Other Antimicrobials: Cefepime    Imaging Reviewed?: Yes    Microbiology Data  MRSA PCR performed: 23; Result: Pending  Culture/Source:    S.pneumo AG +     Vitals/Labs  Ht: 177.8 cm (70\"); Wt: 80.4 kg (177 lb 4 oz)  Temp (24hrs), Av.9 °F (37.2 °C), Min:97.8 °F (36.6 °C), Max:100 °F (37.8 °C)   Estimated Creatinine Clearance: 59.4 mL/min (by C-G formula based on SCr of 1.11 mg/dL).       Results from last 7 days   Lab Units 23  1300 23  1114 23  1010 23  0630   CREATININE mg/dL 1.11 1.21  --  1.45*   WBC 10*3/mm3  --   --  16.05* 17.84*     Assessment/Plan:    Random returned:  @ 7264 11.8  Current regimen 1250 mg IV q24 gives the following:  AUC24,ss: 377 mg/L.hr  PAUC*: 40 %  Ctrough,ss: 10.5 mg/L  Pconc*: 2 %  Tox.: 6 %  Will adjust Vancomycin Dose:  1500 mg IV every 24 hours  AUC24,ss: 448 mg/L.hr  PAUC*: 69 %  Ctrough,ss: 12.5 mg/L  Pconc*: 7 %  Tox.: 8 %  Next level as appropriate  Febrile this evening, MRSA PCR returned negative, will monitor  Patient has order for Basic Metabolic Panel x 3 days    Pharmacy will follow patient's kidney function and will adjust doses and obtain levels as necessary. Thank you for involving pharmacy in this patient's care. Please contact pharmacy with any questions or concerns.                           Courtney Santiago [facility]  Clinical Pharmacist      "

## 2023-04-20 NOTE — PROGRESS NOTES
Respiratory Therapist Broncho-Pulmonary Hygiene Progress Note      Patient Name:  Cristi Maloney  YOB: 1940    Cristi Maloney meets the qualification for Level 3 of the Bronco-Pulmonary Hygiene Protocol. This was based on my daily patient assessment and includes review of chest x-ray results, cough ability and quality, oxygenation, secretions or risk for secretion development and patient mobility.     Broncho-Pulmonary Hygiene Assessment:    Level of Movement: Actively changing positions without assistance  Alert/ oriented/ cooperative    Breath Sounds: Diminished and/or coarse rhonchi    Cough: Strong, effective and/or frequent    Chest X-Ray: Presence of atelectasis and/or consolidation    Sputum Productions: None or small amount of thin or watery secretions with effective cough    History and Physical: New onset of bronchitis or existing chronic pulmonary conditions.  **(not in an exacerbation)    SpO2 to Oxygen Need: greater than 90% on  greater than 6L, Vapotherm, oxygen mask greater than 40% or ventilator    Current SpO2 is: 98 on 70    Based on this information, I have completed the following interventions: MetaNeb       Electronically signed by Jennifer Sandoval RRT, 04/20/23, 12:59 AM EDT.

## 2023-04-20 NOTE — THERAPY EVALUATION
Acute Care - Speech Language Pathology   Swallow Initial Evaluation  Piyush     Patient Name: Cristi Maloney  : 1940  MRN: [id number]  Today's Date: 2023               Admit Date: 2023    Visit Dx:     ICD-10-CM ICD-9-CM   1. Atrial fibrillation with rapid ventricular response  I48.91 427.31   2. Septic shock  A41.9 038.9    R65.21 785.52     995.92   3. Pneumonia of both lower lobes due to infectious organism  J18.9 486   4. Hypoxia  R09.02 799.02   5. Diabetic ketoacidosis without coma associated with other specified diabetes mellitus  E13.10 250.12   6. Acute respiratory failure with hypoxia  J96.01 518.81   7. Dysphagia, oropharyngeal  R13.12 787.22     Patient Active Problem List   Diagnosis   • Sepsis     Past Medical History:   Diagnosis Date   • CHF (congestive heart failure)    • Elevated cholesterol    • Hypertension      Past Surgical History:   Procedure Laterality Date   • JOINT REPLACEMENT         PAIN SCALE: None noted    PRECAUTIONS/CONTRAINDICATIONS: None noted    SUSPECTED ABUSE/NEGLECT/EXPLOITATION: None noted    SOCIAL/PSYCHOLOGICAL NEEDS/BARRIERS: None noted    PAST SOCIAL HISTORY: Lives at home    PRIOR FUNCTION: Independent    PATIENT GOALS/EXPECTATIONS: Did not report    HISTORY: This patient is a very pleasant 82-year-old male admitted with the above diagnosis.  Patient reports 3 to 4-day history prior to admission with very limited intake due to not feeling well.  Patient status post recent shoulder surgery.  Patient's chest x-ray shows bilateral consolidations in the lower lobes.  Patient denies any prior history of dysphagia or clinical signs of aspiration.    CURRENT DIET LEVEL: N.p.o.    OBJECTIVE:    TEST ADMINISTERED: Clinical dysphagia evaluation    COGNITION/SAFETY AWARENESS: Alert    BEHAVIORAL OBSERVATIONS: Cooperative    ORAL MOTOR EXAM: Generalized oral motor weakness is noted.    VOICE QUALITY: Within functional limits    REFLEX EXAM: Deferred    POSTURE: 90  degrees upright    FEEDING/SWALLOWING FUNCTION: Assessed with ice chips, thin liquids, honey thick liquids and purée consistencies    CLINICAL OBSERVATIONS: Oral stage is characterized by good bolus preparation and control.  Timely oral transit.  Swallow completed with good laryngeal elevation per palpation.  Patient does present with some clinical signs of aspiration with thin liquids.  Some throat clearing with repeated trials of puréed consistencies and honey thick liquids.  Patient reported intermittent globus sensation after completion of swallow with purée.  Patient also coughing up secretions prior to p.o. trials and complaining of nausea intermittently.  Clinical signs of aspiration that were observed appear to be consistently correlated to swallowing.  Patient tolerated single ice chips without clinical signs of aspiration.    DYSPHAGIA CRITERIA: Risk of aspiration    FUNCTIONAL ASSESSMENT INSTRUMENT: Patient currently scored a level 4 of 7 on Functional Communication Measures for swallowing indicating a 40-59% limitation in function.    ASSESSMENT/ PLAN OF CARE:  Pt presents with limitations, noted below, that impede his ability to swallow safely. The skills of a therapist will be required to safely and effectively implement the following treatment plan to restore maximal level of function.    PROBLEMS:  1.  Dysphagia   LTG 1: (30 days) patient will increase ability to tolerate least restrictive diet improve functional communication measure for swallowing to a 5 of 7.   STG 1a: (14 days) patient will tolerate therapeutic trials of thin, nectar, honey thick liquids without clinical sign or symptom of aspiration with 8 of 10 trials.   STG 1b: (14 days) patient will tolerate therapeutic trials of purée and soft solids without clinical sign or symptom of aspiration with 8 of 10 trials.   STG 1c: (14 days) patient/family teaching regarding diet recommendations, safe swallow strategies and signs and symptoms of  aspiration.     TREATMENT: Dysphagia therapy to ensure diet tolerance, advance to least restrictive diet and analyze for aspiration risk.    FREQUENCY/DURATION: Twice daily 5 times a week    REHAB POTENTIAL:  Pt has good rehab potential.  The following limitations may influence improvement/ length of tx medical status.    RECOMMENDATIONS:   1.   DIET: Limit intake to oral meds in applesauce and ice chips conservatively after good oral care.  Speech therapy will follow up and reassess in the a.m.  Hopeful patient will be able to advance diet in the a.m.    YES: Pt/responsible party agrees with plan of care and has been informed of all alternatives, risks and benefits.    EDUCATION  The patient has been educated in the following areas:   Dysphagia (Swallowing Impairment).            Savannah Major, SLP  4/20/2023

## 2023-04-20 NOTE — PROGRESS NOTES
Patient with multifocal atrial tachycardia.  If blood pressure tolerates, would put him back on the diltiazem 15 mg p.o. every 6 hours.  It apparently was discontinued last night.  Then would slowly titrate up as blood pressure tolerates.  Before discharge he could be sent out on equivalent Cardizem CD daily.  He has an echocardiogram that shows normal ejection fraction and just mild aortic stenosis.  He does not require anticoagulation for multifocal atrial tachycardia.  Will follow from a distance.  Call with any questions.

## 2023-04-21 LAB
ALBUMIN SERPL-MCNC: 2.1 G/DL (ref 3.5–5.2)
ALP SERPL-CCNC: 52 U/L (ref 39–117)
ALT SERPL W P-5'-P-CCNC: 14 U/L (ref 1–41)
ANION GAP SERPL CALCULATED.3IONS-SCNC: 10 MMOL/L (ref 5–15)
ANION GAP SERPL CALCULATED.3IONS-SCNC: 12.9 MMOL/L (ref 5–15)
ANION GAP SERPL CALCULATED.3IONS-SCNC: 13.4 MMOL/L (ref 5–15)
ANION GAP SERPL CALCULATED.3IONS-SCNC: 7.6 MMOL/L (ref 5–15)
ANION GAP SERPL CALCULATED.3IONS-SCNC: 8.6 MMOL/L (ref 5–15)
ANION GAP SERPL CALCULATED.3IONS-SCNC: 9.3 MMOL/L (ref 5–15)
AST SERPL-CCNC: 25 U/L (ref 1–40)
BASOPHILS # BLD AUTO: 0.02 10*3/MM3 (ref 0–0.2)
BASOPHILS NFR BLD AUTO: 0.1 % (ref 0–1.5)
BILIRUB CONJ SERPL-MCNC: 0.3 MG/DL (ref 0–0.3)
BILIRUB INDIRECT SERPL-MCNC: 0.5 MG/DL
BILIRUB SERPL-MCNC: 0.8 MG/DL (ref 0–1.2)
BUN SERPL-MCNC: 13 MG/DL (ref 8–23)
BUN SERPL-MCNC: 14 MG/DL (ref 8–23)
BUN SERPL-MCNC: 14 MG/DL (ref 8–23)
BUN SERPL-MCNC: 15 MG/DL (ref 8–23)
BUN SERPL-MCNC: 15 MG/DL (ref 8–23)
BUN SERPL-MCNC: 16 MG/DL (ref 8–23)
BUN/CREAT SERPL: 23.6 (ref 7–25)
BUN/CREAT SERPL: 25.5 (ref 7–25)
BUN/CREAT SERPL: 26.7 (ref 7–25)
BUN/CREAT SERPL: 28 (ref 7–25)
BUN/CREAT SERPL: 28.3 (ref 7–25)
BUN/CREAT SERPL: 29.4 (ref 7–25)
CALCIUM SPEC-SCNC: 8.3 MG/DL (ref 8.6–10.5)
CALCIUM SPEC-SCNC: 8.4 MG/DL (ref 8.6–10.5)
CALCIUM SPEC-SCNC: 8.5 MG/DL (ref 8.6–10.5)
CALCIUM SPEC-SCNC: 8.5 MG/DL (ref 8.6–10.5)
CALCIUM SPEC-SCNC: 8.7 MG/DL (ref 8.6–10.5)
CALCIUM SPEC-SCNC: 8.7 MG/DL (ref 8.6–10.5)
CHLORIDE SERPL-SCNC: 103 MMOL/L (ref 98–107)
CHLORIDE SERPL-SCNC: 105 MMOL/L (ref 98–107)
CHLORIDE SERPL-SCNC: 105 MMOL/L (ref 98–107)
CHLORIDE SERPL-SCNC: 109 MMOL/L (ref 98–107)
CHLORIDE SERPL-SCNC: 111 MMOL/L (ref 98–107)
CHLORIDE SERPL-SCNC: 113 MMOL/L (ref 98–107)
CO2 SERPL-SCNC: 16.6 MMOL/L (ref 22–29)
CO2 SERPL-SCNC: 17.7 MMOL/L (ref 22–29)
CO2 SERPL-SCNC: 18 MMOL/L (ref 22–29)
CO2 SERPL-SCNC: 18.1 MMOL/L (ref 22–29)
CO2 SERPL-SCNC: 19.4 MMOL/L (ref 22–29)
CO2 SERPL-SCNC: 19.4 MMOL/L (ref 22–29)
CREAT SERPL-MCNC: 0.5 MG/DL (ref 0.76–1.27)
CREAT SERPL-MCNC: 0.51 MG/DL (ref 0.76–1.27)
CREAT SERPL-MCNC: 0.53 MG/DL (ref 0.76–1.27)
CREAT SERPL-MCNC: 0.55 MG/DL (ref 0.76–1.27)
CREAT SERPL-MCNC: 0.55 MG/DL (ref 0.76–1.27)
CREAT SERPL-MCNC: 0.6 MG/DL (ref 0.76–1.27)
DEPRECATED RDW RBC AUTO: 50.9 FL (ref 37–54)
EGFRCR SERPLBLD CKD-EPI 2021: 100.7 ML/MIN/1.73
EGFRCR SERPLBLD CKD-EPI 2021: 101.9 ML/MIN/1.73
EGFRCR SERPLBLD CKD-EPI 2021: 102.5 ML/MIN/1.73
EGFRCR SERPLBLD CKD-EPI 2021: 97 ML/MIN/1.73
EGFRCR SERPLBLD CKD-EPI 2021: 99.6 ML/MIN/1.73
EGFRCR SERPLBLD CKD-EPI 2021: 99.6 ML/MIN/1.73
EOSINOPHIL # BLD AUTO: 0.03 10*3/MM3 (ref 0–0.4)
EOSINOPHIL NFR BLD AUTO: 0.2 % (ref 0.3–6.2)
ERYTHROCYTE [DISTWIDTH] IN BLOOD BY AUTOMATED COUNT: 14.7 % (ref 12.3–15.4)
GLUCOSE BLDC GLUCOMTR-MCNC: 112 MG/DL (ref 70–99)
GLUCOSE BLDC GLUCOMTR-MCNC: 119 MG/DL (ref 70–99)
GLUCOSE BLDC GLUCOMTR-MCNC: 121 MG/DL (ref 70–99)
GLUCOSE BLDC GLUCOMTR-MCNC: 121 MG/DL (ref 70–99)
GLUCOSE BLDC GLUCOMTR-MCNC: 143 MG/DL (ref 70–99)
GLUCOSE BLDC GLUCOMTR-MCNC: 148 MG/DL (ref 70–99)
GLUCOSE BLDC GLUCOMTR-MCNC: 160 MG/DL (ref 70–99)
GLUCOSE BLDC GLUCOMTR-MCNC: 162 MG/DL (ref 70–99)
GLUCOSE BLDC GLUCOMTR-MCNC: 166 MG/DL (ref 70–99)
GLUCOSE BLDC GLUCOMTR-MCNC: 90 MG/DL (ref 70–99)
GLUCOSE SERPL-MCNC: 119 MG/DL (ref 65–99)
GLUCOSE SERPL-MCNC: 122 MG/DL (ref 65–99)
GLUCOSE SERPL-MCNC: 124 MG/DL (ref 65–99)
GLUCOSE SERPL-MCNC: 145 MG/DL (ref 65–99)
GLUCOSE SERPL-MCNC: 166 MG/DL (ref 65–99)
GLUCOSE SERPL-MCNC: 169 MG/DL (ref 65–99)
HCT VFR BLD AUTO: 27.2 % (ref 37.5–51)
HGB BLD-MCNC: 9.3 G/DL (ref 13–17.7)
IMM GRANULOCYTES # BLD AUTO: 0.2 10*3/MM3 (ref 0–0.05)
IMM GRANULOCYTES NFR BLD AUTO: 1.4 % (ref 0–0.5)
LYMPHOCYTES # BLD AUTO: 1.13 10*3/MM3 (ref 0.7–3.1)
LYMPHOCYTES NFR BLD AUTO: 7.9 % (ref 19.6–45.3)
MAGNESIUM SERPL-MCNC: 1.7 MG/DL (ref 1.6–2.4)
MAGNESIUM SERPL-MCNC: 1.9 MG/DL (ref 1.6–2.4)
MAGNESIUM SERPL-MCNC: 2 MG/DL (ref 1.6–2.4)
MAGNESIUM SERPL-MCNC: 2.2 MG/DL (ref 1.6–2.4)
MCH RBC QN AUTO: 32 PG (ref 26.6–33)
MCHC RBC AUTO-ENTMCNC: 34.2 G/DL (ref 31.5–35.7)
MCV RBC AUTO: 93.5 FL (ref 79–97)
MONOCYTES # BLD AUTO: 0.34 10*3/MM3 (ref 0.1–0.9)
MONOCYTES NFR BLD AUTO: 2.4 % (ref 5–12)
NEUTROPHILS NFR BLD AUTO: 12.57 10*3/MM3 (ref 1.7–7)
NEUTROPHILS NFR BLD AUTO: 88 % (ref 42.7–76)
NRBC BLD AUTO-RTO: 0 /100 WBC (ref 0–0.2)
PHOSPHATE SERPL-MCNC: 1.9 MG/DL (ref 2.5–4.5)
PHOSPHATE SERPL-MCNC: 2.2 MG/DL (ref 2.5–4.5)
PHOSPHATE SERPL-MCNC: 2.3 MG/DL (ref 2.5–4.5)
PHOSPHATE SERPL-MCNC: 2.4 MG/DL (ref 2.5–4.5)
PHOSPHATE SERPL-MCNC: 2.6 MG/DL (ref 2.5–4.5)
PHOSPHATE SERPL-MCNC: 2.8 MG/DL (ref 2.5–4.5)
PLATELET # BLD AUTO: 183 10*3/MM3 (ref 140–450)
PMV BLD AUTO: 10.3 FL (ref 6–12)
POTASSIUM SERPL-SCNC: 3.7 MMOL/L (ref 3.5–5.2)
POTASSIUM SERPL-SCNC: 3.9 MMOL/L (ref 3.5–5.2)
POTASSIUM SERPL-SCNC: 4 MMOL/L (ref 3.5–5.2)
POTASSIUM SERPL-SCNC: 4.2 MMOL/L (ref 3.5–5.2)
POTASSIUM SERPL-SCNC: 4.3 MMOL/L (ref 3.5–5.2)
POTASSIUM SERPL-SCNC: 4.6 MMOL/L (ref 3.5–5.2)
PROT SERPL-MCNC: 4.8 G/DL (ref 6–8.5)
RBC # BLD AUTO: 2.91 10*6/MM3 (ref 4.14–5.8)
SODIUM SERPL-SCNC: 132 MMOL/L (ref 136–145)
SODIUM SERPL-SCNC: 134 MMOL/L (ref 136–145)
SODIUM SERPL-SCNC: 135 MMOL/L (ref 136–145)
SODIUM SERPL-SCNC: 137 MMOL/L (ref 136–145)
SODIUM SERPL-SCNC: 139 MMOL/L (ref 136–145)
SODIUM SERPL-SCNC: 140 MMOL/L (ref 136–145)
T4 FREE SERPL-MCNC: 2 NG/DL (ref 0.93–1.7)
T4 FREE SERPL-MCNC: 2.06 NG/DL (ref 0.93–1.7)
WBC NRBC COR # BLD: 14.29 10*3/MM3 (ref 3.4–10.8)

## 2023-04-21 PROCEDURE — 84100 ASSAY OF PHOSPHORUS: CPT | Performed by: INTERNAL MEDICINE

## 2023-04-21 PROCEDURE — 83735 ASSAY OF MAGNESIUM: CPT | Performed by: INTERNAL MEDICINE

## 2023-04-21 PROCEDURE — 25010000002 AZITHROMYCIN PER 500 MG: Performed by: INTERNAL MEDICINE

## 2023-04-21 PROCEDURE — 94664 DEMO&/EVAL PT USE INHALER: CPT

## 2023-04-21 PROCEDURE — 25010000002 ENOXAPARIN PER 10 MG: Performed by: INTERNAL MEDICINE

## 2023-04-21 PROCEDURE — 25010000002 CEFTRIAXONE PER 250 MG: Performed by: INTERNAL MEDICINE

## 2023-04-21 PROCEDURE — 99291 CRITICAL CARE FIRST HOUR: CPT | Performed by: INTERNAL MEDICINE

## 2023-04-21 PROCEDURE — 84439 ASSAY OF FREE THYROXINE: CPT | Performed by: PHYSICIAN ASSISTANT

## 2023-04-21 PROCEDURE — 85025 COMPLETE CBC W/AUTO DIFF WBC: CPT | Performed by: FAMILY MEDICINE

## 2023-04-21 PROCEDURE — 63710000001 INSULIN DETEMIR PER 5 UNITS: Performed by: INTERNAL MEDICINE

## 2023-04-21 PROCEDURE — 94799 UNLISTED PULMONARY SVC/PX: CPT

## 2023-04-21 PROCEDURE — 92526 ORAL FUNCTION THERAPY: CPT

## 2023-04-21 PROCEDURE — 25010000002 MAGNESIUM SULFATE 2 GM/50ML SOLUTION: Performed by: INTERNAL MEDICINE

## 2023-04-21 PROCEDURE — 63710000001 INSULIN LISPRO (HUMAN) PER 5 UNITS: Performed by: INTERNAL MEDICINE

## 2023-04-21 PROCEDURE — 94761 N-INVAS EAR/PLS OXIMETRY MLT: CPT

## 2023-04-21 PROCEDURE — 82962 GLUCOSE BLOOD TEST: CPT

## 2023-04-21 PROCEDURE — 80048 BASIC METABOLIC PNL TOTAL CA: CPT | Performed by: INTERNAL MEDICINE

## 2023-04-21 PROCEDURE — 99233 SBSQ HOSP IP/OBS HIGH 50: CPT | Performed by: FAMILY MEDICINE

## 2023-04-21 RX ORDER — MAGNESIUM SULFATE HEPTAHYDRATE 40 MG/ML
2 INJECTION, SOLUTION INTRAVENOUS ONCE
Status: COMPLETED | OUTPATIENT
Start: 2023-04-21 | End: 2023-04-21

## 2023-04-21 RX ORDER — DEXTROSE MONOHYDRATE 25 G/50ML
25 INJECTION, SOLUTION INTRAVENOUS
Status: DISCONTINUED | OUTPATIENT
Start: 2023-04-21 | End: 2023-04-26 | Stop reason: HOSPADM

## 2023-04-21 RX ORDER — INSULIN LISPRO 100 [IU]/ML
2-9 INJECTION, SOLUTION INTRAVENOUS; SUBCUTANEOUS
Status: DISCONTINUED | OUTPATIENT
Start: 2023-04-21 | End: 2023-04-26 | Stop reason: HOSPADM

## 2023-04-21 RX ORDER — NICOTINE POLACRILEX 4 MG
15 LOZENGE BUCCAL
Status: DISCONTINUED | OUTPATIENT
Start: 2023-04-21 | End: 2023-04-26 | Stop reason: HOSPADM

## 2023-04-21 RX ADMIN — INSULIN DETEMIR 10 UNITS: 100 INJECTION, SOLUTION SUBCUTANEOUS at 20:55

## 2023-04-21 RX ADMIN — INSULIN HUMAN 4 UNITS/HR: 1 INJECTION, SOLUTION INTRAVENOUS at 10:41

## 2023-04-21 RX ADMIN — DILTIAZEM HYDROCHLORIDE 30 MG: 30 TABLET, FILM COATED ORAL at 18:11

## 2023-04-21 RX ADMIN — INSULIN DETEMIR 10 UNITS: 100 INJECTION, SOLUTION SUBCUTANEOUS at 09:45

## 2023-04-21 RX ADMIN — AZITHROMYCIN DIHYDRATE 500 MG: 500 INJECTION, POWDER, LYOPHILIZED, FOR SOLUTION INTRAVENOUS at 09:47

## 2023-04-21 RX ADMIN — Medication 10 ML: at 09:48

## 2023-04-21 RX ADMIN — CEFTRIAXONE SODIUM 1 G: 1 INJECTION, SOLUTION INTRAVENOUS at 09:47

## 2023-04-21 RX ADMIN — INSULIN LISPRO 2 UNITS: 100 INJECTION, SOLUTION INTRAVENOUS; SUBCUTANEOUS at 18:11

## 2023-04-21 RX ADMIN — MAGNESIUM SULFATE HEPTAHYDRATE 2 G: 40 INJECTION, SOLUTION INTRAVENOUS at 15:55

## 2023-04-21 RX ADMIN — LEVALBUTEROL HYDROCHLORIDE 1.25 MG: 1.25 SOLUTION RESPIRATORY (INHALATION) at 07:34

## 2023-04-21 RX ADMIN — DILTIAZEM HYDROCHLORIDE 30 MG: 30 TABLET, FILM COATED ORAL at 12:30

## 2023-04-21 RX ADMIN — LEVALBUTEROL HYDROCHLORIDE 1.25 MG: 1.25 SOLUTION RESPIRATORY (INHALATION) at 00:19

## 2023-04-21 RX ADMIN — Medication 4 ML: at 07:34

## 2023-04-21 RX ADMIN — Medication 4 ML: at 18:34

## 2023-04-21 RX ADMIN — METHIMAZOLE 5 MG: 5 TABLET ORAL at 20:55

## 2023-04-21 RX ADMIN — ENOXAPARIN SODIUM 40 MG: 100 INJECTION SUBCUTANEOUS at 09:47

## 2023-04-21 RX ADMIN — LEVALBUTEROL HYDROCHLORIDE 1.25 MG: 1.25 SOLUTION RESPIRATORY (INHALATION) at 12:10

## 2023-04-21 RX ADMIN — DILTIAZEM HYDROCHLORIDE 30 MG: 30 TABLET, FILM COATED ORAL at 06:09

## 2023-04-21 RX ADMIN — CLOPIDOGREL BISULFATE 75 MG: 75 TABLET ORAL at 09:47

## 2023-04-21 RX ADMIN — POTASSIUM CHLORIDE, DEXTROSE MONOHYDRATE AND SODIUM CHLORIDE 150 ML/HR: 150; 5; 450 INJECTION, SOLUTION INTRAVENOUS at 00:20

## 2023-04-21 RX ADMIN — MONTELUKAST 10 MG: 10 TABLET, FILM COATED ORAL at 20:55

## 2023-04-21 RX ADMIN — POTASSIUM CHLORIDE, DEXTROSE MONOHYDRATE AND SODIUM CHLORIDE 150 ML/HR: 150; 5; 450 INJECTION, SOLUTION INTRAVENOUS at 06:30

## 2023-04-21 RX ADMIN — TAMSULOSIN HYDROCHLORIDE 0.4 MG: 0.4 CAPSULE ORAL at 09:47

## 2023-04-21 RX ADMIN — METHIMAZOLE 5 MG: 5 TABLET ORAL at 15:55

## 2023-04-21 RX ADMIN — METHIMAZOLE 5 MG: 5 TABLET ORAL at 06:09

## 2023-04-21 RX ADMIN — Medication 10 ML: at 20:55

## 2023-04-21 RX ADMIN — LEVALBUTEROL HYDROCHLORIDE 1.25 MG: 1.25 SOLUTION RESPIRATORY (INHALATION) at 18:34

## 2023-04-21 NOTE — PLAN OF CARE
Goal Outcome Evaluation:   1130: Insulin drip, fluid exchanges, and Glucommander Dc'ed, transitioned pt to Subcut insulin--been tolerating well. Given Diet of Mech soft, spoon fed, nectar thick.   VSS throughout shift, NSR between 80's-90's. Will continue to monitor.

## 2023-04-21 NOTE — PROGRESS NOTES
[facility]   Hospitalist Progress Note  Date: 2023  Patient Name: Cristi Maloney  : 1940  MRN: [id number]  Date of admission: 2023      Subjective   Subjective     Chief complaint: Altered mental status, shortness of breath     Summary:  82-year-old male with history of diabetes, ex-smoker, with recent left shoulder surgery, was found down by his wife, short of air, tachycardic, tachypneic, found to be in DKA as well as A-fib with RVR, bilateral pneumonia, placed on BiPAP, hypotension, admitted to the ICU, critical care consulted, cardiology consulted, failed several rounds of cardioversion, concern for thyrotoxicosis, primary hypothyroidism, started on PTU with transition to methimazole.  Strep pneumo antigen positive     Interval follow-up: Patient seen and examined, continues to show improvements in oxygen requirements, weaning down to 2 L nasal cannula, breathing better, appetite is improving, remains on continuous insulin infusion.  Plan to switch to basal bolus today.  Gaps are closed.  No chest pain or palpitations.  Still has a mild metabolic acidosis, bicarb 19, phosphorus critically low at 1.9, replace as phosphorus ordered, white blood cell count 14,000, hemoglobin 9.3, blood sugars in the 100 range.  Telemetry reviewed, no acute major arrhythmic events.  Struggles with swallowing.     Review of systems:  All systems reviewed and negative except for generalized fatigue, shortness of breath with exertion    Objective   Objective     Vitals:   Temp:  [99.3 °F (37.4 °C)-99.9 °F (37.7 °C)] 99.8 °F (37.7 °C)  Heart Rate:  [] 89  Resp:  [14-22] 18  BP: ()/(52-85) 126/63  Flow (L/min):  [2-40] 2  Physical Exam    Constitutional: Alert, awake, oriented, answering questions with nasal cannula oxygen              Eyes: Pupils equal, sclerae anicteric              HENT: NCAT, mucous membranes moist              Neck: Supple, no thyromegaly              Respiratory: Air entry  bilaterally, coarse breath sounds throughout, scattered rhonchi              Cardiovascular: Regular rate, regular rhythm              Gastrointestinal: Positive bowel sounds, soft, nontender, nondistended              Musculoskeletal: left shoulder incision and is in immobilizer              Psychiatric: Pleasant mood and affect              Neurologic: Alert and awake and oriented, cranial nerves II through XII grossly intact to confrontation              Skin: No rashes  Result Review    Result Review:  I have personally reviewed the pertinent results from the past 24 hours to 4/21/2023 16:07 EDT and agree with these findings:  [x]  Laboratory   CBC        3/22/2023    11:12 4/19/2023    06:30 4/19/2023    10:10 4/19/2023    23:31 4/21/2023    03:56   CBC   WBC 6.77      17.84   16.05   16.77   14.29     RBC 4.41      4.48   4.04   3.29   2.91     Hemoglobin 13.7      14.2   12.9   10.6   9.3     Hematocrit 43.8      42.7   38.4   30.4   27.2     MCV 99.3      95.3   95.0   92.4   93.5     MCH 31.1      31.7   31.9   32.2   32.0     MCHC 31.3      33.3   33.6   34.9   34.2     RDW 13.9      13.8   13.8   14.1   14.7     Platelets 192      330   299   215   183         This result is from an external source.     BMP        4/19/2023    06:30 4/19/2023    06:33 4/19/2023    11:14 4/19/2023    13:00 4/19/2023    19:37   BMP   BUN 39    35   34   30     Creatinine 1.45    1.21   1.11   0.94     Sodium 137   139.3   143   143   144     Potassium 3.2    2.9   3.0   4.5     Chloride 100    111   113   117     CO2 13.4    15.4   14.9   16.8     Calcium 9.7    8.7   9.0   8.9             4/19/2023    23:31 4/20/2023    03:21 4/20/2023    07:52 4/20/2023    12:40 4/20/2023    16:29   BMP   BUN 28      28   27   24   21   18     Creatinine 0.82      0.82   0.80   0.69   0.62   0.61     Sodium 144      144   144   141   142   140     Potassium 4.6      4.6   4.3   4.0   4.2   4.1     Chloride 118      118   118   117   116    116     CO2 16.9      16.9   17.4   15.2   16.6   17.1     Calcium 8.4      8.4   8.4   8.5   8.5   8.2             4/20/2023    19:51 4/20/2023    23:49 4/21/2023    03:56 4/21/2023    10:43 4/21/2023    12:29   BMP   BUN 18   16   15   13   14     Creatinine 0.62   0.60   0.53   0.55   0.50     Sodium 141   140   139   137   132     Potassium 3.9   3.7   4.0   3.9   4.2     Chloride 114   113   111   109   105     CO2 18.7   19.4   18.0   19.4   17.7     Calcium 8.5   8.5   8.3   8.5   8.4       LIVER FUNCTION TESTS:      Lab 04/20/23  2349 04/19/23  2331 04/19/23  0630   TOTAL PROTEIN 4.8* 5.0* 6.9   ALBUMIN 2.1* 2.4* 3.3*   GLOBULIN  --  2.6 3.6   ALT (SGPT) 14 10 11   AST (SGOT) 25 19 10   BILIRUBIN 0.8 0.7 1.3*   INDIRECT BILIRUBIN 0.5  --   --    BILIRUBIN DIRECT 0.3  --   --    ALK PHOS 52 46 68       [x]  Microbiology   Blood Culture   Date Value Ref Range Status   04/19/2023 No growth at 2 days  Preliminary   04/19/2023 No growth at 2 days  Preliminary     No results found for: BCIDPCR, CXREFLEX, CSFCX, CULTURETIS  No results found for: CULTURES, HSVCX, URCX  No results found for: EYECULTURE, GCCX, HSVCULTURE, LABHSV  No results found for: LEGIONELLA, MRSACX, MUMPSCX, MYCOPLASCX  No results found for: NOCARDIACX, STOOLCX  No results found for: THROATCX, UNSTIMCULT, URINECX, CULTURE, VZVCULTUR  No results found for: VIRALCULTU, WOUNDCX    [x]  Radiology Adult Transthoracic Echo Complete W/ Cont if Necessary Per Protocol    Result Date: 4/20/2023  •  Technically difficult study. •  Left ventricular ejection fraction appears to be 51 - 55%.  No obvious regional wall motion abnormalities. •  Left ventricular diastolic function is consistent with (grade I) impaired relaxation and age. •  Mild aortic valve stenosis is present with max/mean pressure gradient 20/13 mmHg..     CT Chest With Contrast Diagnostic    Result Date: 4/19/2023  PROCEDURE: CT CHEST W CONTRAST DIAGNOSTIC  COMPARISON:  None  INDICATIONS: shortness of breath  TECHNIQUE: After obtaining the patient's consent, CT images were obtained with non-ionic intravenous contrast material.   PROTOCOL:   Pulmonary embolism imaging protocol performed    RADIATION:   DLP: 525.7mGy*cm   Automated exposure control was utilized to minimize radiation dose. CONTRAST: 100cc Isovue 370 I.V.  FINDINGS:  There is advanced emphysematous lung disease.  There is airspace disease dependently in both lower lobes.  No nodules or masses are identified.  There is atherosclerotic disease in the aorta and coronary arteries.  There is no mediastinal lymphadenopathy.  There are enlarged left hilar nodes measuring 2.3 by 1.3 cm.  There are borderline enlarged right hilar nodes.  Densely calcified nodes are present in the left hilum.  No acute findings are present in the upper abdomen.  Degenerative changes are present in the dorsal spine without definite bone destruction.  There are postoperative changes of left shoulder arthroplasty.  The aorta is of normal caliber.  There is significant motion degradation in the lung bases.  There are no obvious filling defects within the main or proximal pulmonary arteries.        1. Significant motion artifact is present in the bases.  No central pulmonary emboli are identified.  2. Extensive consolidation in both lower lobes raising the question of aspiration. 3. Advanced emphysematous lung disease 4. Extensive coronary artery atherosclerotic calcifications. 5. Left hilar lymphadenopathy with a 2.3 cm node.  There are densely calcified nodes in the left hilum as well.  Smaller nodes are present within the right hilum.  There is no mediastinal lymphadenopathy. 6. Postop changes of recent left shoulder arthroplasty.     Jordan Marrero MD       Electronically Signed and Approved By: Jordan Marrero MD on 4/19/2023 at 7:46             XR Chest 1 View    Result Date: 4/19/2023  PROCEDURE: XR CHEST 1 VW  COMPARISON: [facility],  CR, CHEST AP/PA 1 VIEW, 2013, 16:00.  INDICATIONS: Dysrhythmia  FINDINGS:  Infiltrates are noted in the lung bases.  The cardiac silhouette and mediastinum are stable.  Aortic atherosclerosis is noted.  No acute osseous abnormalities are observed.        1. Bibasilar infiltrates are noted.  The findings may relate to bibasilar pneumonia.  Changes of CHF and pulmonary edema could also have this appearance.       ANAYA FELDMAN MD       Electronically Signed and Approved By: ANAYA FELDMAN MD on 2023 at 6:46             XR Shoulder 1 Vw LT    Result Date: 4/10/2023  REVIEWING YOUR TEST RESULTS IN [facility] IS NOT A SUBSTITUTE FOR DISCUSSING THOSE RESULTS WITH YOUR HEALTH CARE PROVIDER. PLEASE CON[facility] YOUR PROVIDER [facility] TO DISCUSS ANY QUESTIONS OR CONCERNS YOU MAY HAVE REGARDING THESE TEST RESULTS.  RADIOLOGY REPORT FACILITY:  [facility] UNIT/AGE/GENDER: JEAN-PIERREPERIOP  OP      AGE:82 Y          SEX:M PATIENT NAME/:  DAPHNE BUSBY SON    1940 UNIT NUMBER:  [id number] ACCOUNT NUMBER:  [id number] ACCESSION NUMBER:  [id number] PORTABLE AP LEFT SHOULDER 1328 hours DATE: 04/10/2023 COMPARISON: 2023 INDICATION: Status post shoulder joint replacement. IMPRESSION: Patient is status post reverse total left shoulder arthroplasty. Prosthesis is in expected position and alignment in this single view. Negative for periprosthetic fracture. Surgical drain in place. Dictated by: Marlo Winters M.D. Images and Report reviewed and interpreted by: Marlo Winters M.D. <PS><Electronically signed by: Marlo Winters M.D.> 04/10/2023 1349 D: 04/10/2023 1348 T: 04/10/2023 1348      [x]  EKG/Telemetry   []  Cardiology/Vascular   []  Pathology  [x]  Old records  []  Other:    Assessment & Plan   Assessment / Plan     Assessment/Plan:  Assessment:  Atrial fibrillation rapid ventricular response  Multifocal atrial tachycardia  Multifocal pneumonia with concern for pneumococcal  infection  Acute hypoxemic respiratory failure  Diabetic ketoacidosis  JUNIOR  Anion gap metabolic acidosis  Thyrotoxicosis/hyperthyroidism  Hypophosphatemia  Hypokalemia  Acute metabolic encephalopathy multifactorial likely related to sepsis  COPD  Dyslipidemia  Chronic diastolic CHF  Aortic valve stenosis     Plan:  Labs and imaging reviewed  Continue to wean oxygen per tolerance  Speech therapy evaluation  Remove Henry catheter  Start Levemir 10 units twice daily  Start insulin sliding scale coverage  Stop insulin continuous infusion  Start Cardizem 30 mg every 6 hours  Continue Plavix 75 mg daily  Cardiology recommendations appreciated  Discussed with critical care Dr. Meier, recommendations appreciated  Continue azithromycin to complete 3 days total  Continue ceftriaxone 1 g daily to complete 7 days total  Continue methimazole 5 mg every 8 hours  We will need TSH and free T4 reevaluated in the next few days  Tamsulosin 0.5 mg daily continue  Continue telemetry monitoring  A.m. labs  Full code  DVT prophylaxis with Lovenox  Clinical course dictate further management of this critically ill patient in the intensive care unit  Discussed with nurse at the bedside  DVT prophylaxis:  Medical and mechanical DVT prophylaxis orders are present.    CODE STATUS:   Level Of Support Discussed With: Patient  Code Status (Patient has no pulse and is not breathing): CPR (Attempt to Resuscitate)  Medical Interventions (Patient has pulse or is breathing): Full Support  Release to patient: Routine Release        Electronically signed by Daphne Washington MD, 04/21/23, 4:07 PM EDT.    Portions of this documentation were transcribed electronically from a voice recognition software.  I confirm all data accurately represents the service(s) I performed at today's visit.

## 2023-04-21 NOTE — THERAPY TREATMENT NOTE
Acute Care - Speech Language Pathology   Swallow Treatment Note JESSIE Pickett     Patient Name: Cristi Maloney  : 1940  MRN: [id number]  Today's Date: 2023               Admit Date: 2023    Visit Dx:     ICD-10-CM ICD-9-CM   1. Atrial fibrillation with rapid ventricular response  I48.91 427.31   2. Septic shock  A41.9 038.9    R65.21 785.52     995.92   3. Pneumonia of both lower lobes due to infectious organism  J18.9 486   4. Hypoxia  R09.02 799.02   5. Diabetic ketoacidosis without coma associated with other specified diabetes mellitus  E13.10 250.12   6. Acute respiratory failure with hypoxia  J96.01 518.81   7. Dysphagia, oropharyngeal  R13.12 787.22     Patient Active Problem List   Diagnosis   • Sepsis     Past Medical History:   Diagnosis Date   • CHF (congestive heart failure)    • Elevated cholesterol    • Hypertension      Past Surgical History:   Procedure Laterality Date   • JOINT REPLACEMENT     SPEECH PATHOLOGY DYSPHAGIA TREATMENT    Subjective/Behavioral Observations: Patient seen for dysphagia tx.     Current Diet: NPO    Treatment received: Patient seen at bedside.  Therapeutic trials of thin liquids were completed.  Patient with cough present with 50% of trials.  Therapeutic trials of nectar thick liquids from spoon and cup.  Intermittent wet voicing and delayed cough with cup trials.  Patient tolerated spoonfed trials of nectar thick liquids without clinical sign or symptom of aspiration.  Patient tolerated purée and soft solids without clinical sign or symptom of aspiration.  Denies globus sensation after completion of swallow.    Results of treatment: As stated    Progress toward goals: Progressing    Barriers to Achieving goals: Medical status    Plan of care:/changes in plan:     Mechanical soft diet- spoonfed nectar thick liquids  90 degrees upright  Slow rate, small bites/drinks                                                                                 EDUCATION  The  patient has been educated in the following areas:   Dysphagia (Swallowing Impairment).              Time Calculation:       Therapy Charges for Today     Code Description Service Date Service Provider Modifiers Qty    [id number] HC ST EVAL ORAL PHARYNG SWALLOW 4 4/20/2023 Savannah Major, SLP GN 1               Savannah Major SLP  4/21/2023

## 2023-04-22 ENCOUNTER — APPOINTMENT (OUTPATIENT)
Dept: GENERAL RADIOLOGY | Facility: HOSPITAL | Age: 83
End: 2023-04-22
Payer: MEDICARE

## 2023-04-22 LAB
ALBUMIN SERPL-MCNC: 2.5 G/DL (ref 3.5–5.2)
ALP SERPL-CCNC: 67 U/L (ref 39–117)
ALT SERPL W P-5'-P-CCNC: 22 U/L (ref 1–41)
ANION GAP SERPL CALCULATED.3IONS-SCNC: 13.4 MMOL/L (ref 5–15)
AST SERPL-CCNC: 31 U/L (ref 1–40)
BASOPHILS # BLD AUTO: 0.03 10*3/MM3 (ref 0–0.2)
BASOPHILS NFR BLD AUTO: 0.2 % (ref 0–1.5)
BILIRUB CONJ SERPL-MCNC: 0.3 MG/DL (ref 0–0.3)
BILIRUB INDIRECT SERPL-MCNC: 0.6 MG/DL
BILIRUB SERPL-MCNC: 0.9 MG/DL (ref 0–1.2)
BUN SERPL-MCNC: 15 MG/DL (ref 8–23)
BUN/CREAT SERPL: 28.3 (ref 7–25)
CALCIUM SPEC-SCNC: 9.2 MG/DL (ref 8.6–10.5)
CHLORIDE SERPL-SCNC: 103 MMOL/L (ref 98–107)
CO2 SERPL-SCNC: 20.6 MMOL/L (ref 22–29)
CREAT SERPL-MCNC: 0.53 MG/DL (ref 0.76–1.27)
DEPRECATED RDW RBC AUTO: 50 FL (ref 37–54)
EGFRCR SERPLBLD CKD-EPI 2021: 100.7 ML/MIN/1.73
EOSINOPHIL # BLD AUTO: 0.02 10*3/MM3 (ref 0–0.4)
EOSINOPHIL NFR BLD AUTO: 0.2 % (ref 0.3–6.2)
ERYTHROCYTE [DISTWIDTH] IN BLOOD BY AUTOMATED COUNT: 14.5 % (ref 12.3–15.4)
GLUCOSE BLDC GLUCOMTR-MCNC: 135 MG/DL (ref 70–99)
GLUCOSE BLDC GLUCOMTR-MCNC: 194 MG/DL (ref 70–99)
GLUCOSE BLDC GLUCOMTR-MCNC: 247 MG/DL (ref 70–99)
GLUCOSE BLDC GLUCOMTR-MCNC: 251 MG/DL (ref 70–99)
GLUCOSE BLDC GLUCOMTR-MCNC: 259 MG/DL (ref 70–99)
GLUCOSE SERPL-MCNC: 145 MG/DL (ref 65–99)
HCT VFR BLD AUTO: 31.5 % (ref 37.5–51)
HGB BLD-MCNC: 10.8 G/DL (ref 13–17.7)
IMM GRANULOCYTES # BLD AUTO: 0.29 10*3/MM3 (ref 0–0.05)
IMM GRANULOCYTES NFR BLD AUTO: 2.2 % (ref 0–0.5)
LYMPHOCYTES # BLD AUTO: 1.23 10*3/MM3 (ref 0.7–3.1)
LYMPHOCYTES NFR BLD AUTO: 9.4 % (ref 19.6–45.3)
MAGNESIUM SERPL-MCNC: 1.9 MG/DL (ref 1.6–2.4)
MCH RBC QN AUTO: 32 PG (ref 26.6–33)
MCHC RBC AUTO-ENTMCNC: 34.3 G/DL (ref 31.5–35.7)
MCV RBC AUTO: 93.5 FL (ref 79–97)
MONOCYTES # BLD AUTO: 0.36 10*3/MM3 (ref 0.1–0.9)
MONOCYTES NFR BLD AUTO: 2.8 % (ref 5–12)
NEUTROPHILS NFR BLD AUTO: 11.09 10*3/MM3 (ref 1.7–7)
NEUTROPHILS NFR BLD AUTO: 85.2 % (ref 42.7–76)
NRBC BLD AUTO-RTO: 0 /100 WBC (ref 0–0.2)
PHOSPHATE SERPL-MCNC: 3.1 MG/DL (ref 2.5–4.5)
PLATELET # BLD AUTO: 203 10*3/MM3 (ref 140–450)
PMV BLD AUTO: 10.1 FL (ref 6–12)
POTASSIUM SERPL-SCNC: 4.4 MMOL/L (ref 3.5–5.2)
PROT SERPL-MCNC: 5.6 G/DL (ref 6–8.5)
RBC # BLD AUTO: 3.37 10*6/MM3 (ref 4.14–5.8)
SODIUM SERPL-SCNC: 137 MMOL/L (ref 136–145)
T-UPTAKE NFR SERPL: 0.72 TBI (ref 0.8–1.3)
T4 SERPL-MCNC: 9.13 MCG/DL (ref 4.5–11.7)
TSH SERPL DL<=0.05 MIU/L-ACNC: 0.03 UIU/ML (ref 0.27–4.2)
WBC NRBC COR # BLD: 13.02 10*3/MM3 (ref 3.4–10.8)

## 2023-04-22 PROCEDURE — 25010000002 ENOXAPARIN PER 10 MG: Performed by: INTERNAL MEDICINE

## 2023-04-22 PROCEDURE — 25010000002 CEFTRIAXONE PER 250 MG: Performed by: INTERNAL MEDICINE

## 2023-04-22 PROCEDURE — 84100 ASSAY OF PHOSPHORUS: CPT | Performed by: FAMILY MEDICINE

## 2023-04-22 PROCEDURE — 84436 ASSAY OF TOTAL THYROXINE: CPT | Performed by: FAMILY MEDICINE

## 2023-04-22 PROCEDURE — 80048 BASIC METABOLIC PNL TOTAL CA: CPT | Performed by: FAMILY MEDICINE

## 2023-04-22 PROCEDURE — 80076 HEPATIC FUNCTION PANEL: CPT | Performed by: FAMILY MEDICINE

## 2023-04-22 PROCEDURE — 85025 COMPLETE CBC W/AUTO DIFF WBC: CPT | Performed by: FAMILY MEDICINE

## 2023-04-22 PROCEDURE — 63710000001 INSULIN DETEMIR PER 5 UNITS: Performed by: INTERNAL MEDICINE

## 2023-04-22 PROCEDURE — 84443 ASSAY THYROID STIM HORMONE: CPT | Performed by: FAMILY MEDICINE

## 2023-04-22 PROCEDURE — 83735 ASSAY OF MAGNESIUM: CPT | Performed by: FAMILY MEDICINE

## 2023-04-22 PROCEDURE — 94799 UNLISTED PULMONARY SVC/PX: CPT

## 2023-04-22 PROCEDURE — 25010000002 AZITHROMYCIN PER 500 MG: Performed by: INTERNAL MEDICINE

## 2023-04-22 PROCEDURE — 99232 SBSQ HOSP IP/OBS MODERATE 35: CPT | Performed by: INTERNAL MEDICINE

## 2023-04-22 PROCEDURE — 63710000001 INSULIN LISPRO (HUMAN) PER 5 UNITS: Performed by: INTERNAL MEDICINE

## 2023-04-22 PROCEDURE — 94664 DEMO&/EVAL PT USE INHALER: CPT

## 2023-04-22 PROCEDURE — 71045 X-RAY EXAM CHEST 1 VIEW: CPT

## 2023-04-22 PROCEDURE — 94761 N-INVAS EAR/PLS OXIMETRY MLT: CPT

## 2023-04-22 PROCEDURE — 63710000001 INSULIN DETEMIR PER 5 UNITS: Performed by: FAMILY MEDICINE

## 2023-04-22 PROCEDURE — 25010000002 MAGNESIUM SULFATE 2 GM/50ML SOLUTION: Performed by: INTERNAL MEDICINE

## 2023-04-22 PROCEDURE — 99233 SBSQ HOSP IP/OBS HIGH 50: CPT | Performed by: FAMILY MEDICINE

## 2023-04-22 PROCEDURE — 82962 GLUCOSE BLOOD TEST: CPT

## 2023-04-22 PROCEDURE — 84479 ASSAY OF THYROID (T3 OR T4): CPT | Performed by: FAMILY MEDICINE

## 2023-04-22 PROCEDURE — 99291 CRITICAL CARE FIRST HOUR: CPT | Performed by: INTERNAL MEDICINE

## 2023-04-22 RX ORDER — BISACODYL 5 MG/1
5 TABLET, DELAYED RELEASE ORAL DAILY PRN
Status: DISCONTINUED | OUTPATIENT
Start: 2023-04-22 | End: 2023-04-26 | Stop reason: HOSPADM

## 2023-04-22 RX ORDER — DILTIAZEM HYDROCHLORIDE 60 MG/1
60 TABLET, FILM COATED ORAL EVERY 6 HOURS SCHEDULED
Status: DISCONTINUED | OUTPATIENT
Start: 2023-04-22 | End: 2023-04-23

## 2023-04-22 RX ORDER — MAGNESIUM SULFATE HEPTAHYDRATE 40 MG/ML
2 INJECTION, SOLUTION INTRAVENOUS ONCE
Status: COMPLETED | OUTPATIENT
Start: 2023-04-22 | End: 2023-04-22

## 2023-04-22 RX ORDER — AMOXICILLIN 250 MG
2 CAPSULE ORAL 2 TIMES DAILY
Status: DISCONTINUED | OUTPATIENT
Start: 2023-04-22 | End: 2023-04-26 | Stop reason: HOSPADM

## 2023-04-22 RX ORDER — METHIMAZOLE 5 MG/1
10 TABLET ORAL EVERY 8 HOURS SCHEDULED
Status: DISCONTINUED | OUTPATIENT
Start: 2023-04-22 | End: 2023-04-26 | Stop reason: HOSPADM

## 2023-04-22 RX ORDER — METOPROLOL SUCCINATE 25 MG/1
25 TABLET, EXTENDED RELEASE ORAL
Status: DISCONTINUED | OUTPATIENT
Start: 2023-04-22 | End: 2023-04-22

## 2023-04-22 RX ORDER — POLYETHYLENE GLYCOL 3350 17 G/17G
17 POWDER, FOR SOLUTION ORAL DAILY PRN
Status: DISCONTINUED | OUTPATIENT
Start: 2023-04-22 | End: 2023-04-26 | Stop reason: HOSPADM

## 2023-04-22 RX ORDER — DILTIAZEM HYDROCHLORIDE 5 MG/ML
10 INJECTION INTRAVENOUS ONCE
Status: COMPLETED | OUTPATIENT
Start: 2023-04-22 | End: 2023-04-22

## 2023-04-22 RX ORDER — METOPROLOL SUCCINATE 25 MG/1
25 TABLET, EXTENDED RELEASE ORAL EVERY 12 HOURS SCHEDULED
Status: DISCONTINUED | OUTPATIENT
Start: 2023-04-22 | End: 2023-04-23

## 2023-04-22 RX ORDER — BISACODYL 10 MG
10 SUPPOSITORY, RECTAL RECTAL DAILY PRN
Status: DISCONTINUED | OUTPATIENT
Start: 2023-04-22 | End: 2023-04-26 | Stop reason: HOSPADM

## 2023-04-22 RX ADMIN — MONTELUKAST 10 MG: 10 TABLET, FILM COATED ORAL at 20:49

## 2023-04-22 RX ADMIN — INSULIN DETEMIR 10 UNITS: 100 INJECTION, SOLUTION SUBCUTANEOUS at 10:12

## 2023-04-22 RX ADMIN — METOPROLOL TARTRATE 5 MG: 1 INJECTION, SOLUTION INTRAVENOUS at 11:54

## 2023-04-22 RX ADMIN — LEVALBUTEROL HYDROCHLORIDE 1.25 MG: 1.25 SOLUTION RESPIRATORY (INHALATION) at 00:38

## 2023-04-22 RX ADMIN — DILTIAZEM HYDROCHLORIDE 60 MG: 60 TABLET, FILM COATED ORAL at 18:20

## 2023-04-22 RX ADMIN — Medication 4 ML: at 06:31

## 2023-04-22 RX ADMIN — LEVALBUTEROL HYDROCHLORIDE 1.25 MG: 1.25 SOLUTION RESPIRATORY (INHALATION) at 06:31

## 2023-04-22 RX ADMIN — CEFTRIAXONE SODIUM 1 G: 1 INJECTION, SOLUTION INTRAVENOUS at 10:33

## 2023-04-22 RX ADMIN — SENNOSIDES AND DOCUSATE SODIUM 2 TABLET: 8.6; 5 TABLET ORAL at 20:49

## 2023-04-22 RX ADMIN — DILTIAZEM HYDROCHLORIDE 10 MG: 5 INJECTION INTRAVENOUS at 09:56

## 2023-04-22 RX ADMIN — Medication 10 ML: at 10:33

## 2023-04-22 RX ADMIN — Medication 10 ML: at 20:50

## 2023-04-22 RX ADMIN — AZITHROMYCIN DIHYDRATE 500 MG: 500 INJECTION, POWDER, LYOPHILIZED, FOR SOLUTION INTRAVENOUS at 11:58

## 2023-04-22 RX ADMIN — METOPROLOL TARTRATE 5 MG: 1 INJECTION, SOLUTION INTRAVENOUS at 10:22

## 2023-04-22 RX ADMIN — MAGNESIUM SULFATE HEPTAHYDRATE 2 G: 40 INJECTION, SOLUTION INTRAVENOUS at 13:00

## 2023-04-22 RX ADMIN — LEVALBUTEROL HYDROCHLORIDE 1.25 MG: 1.25 SOLUTION RESPIRATORY (INHALATION) at 18:30

## 2023-04-22 RX ADMIN — LEVALBUTEROL HYDROCHLORIDE 1.25 MG: 1.25 SOLUTION RESPIRATORY (INHALATION) at 12:16

## 2023-04-22 RX ADMIN — Medication 4 ML: at 18:30

## 2023-04-22 RX ADMIN — INSULIN LISPRO 2 UNITS: 100 INJECTION, SOLUTION INTRAVENOUS; SUBCUTANEOUS at 18:20

## 2023-04-22 RX ADMIN — DILTIAZEM HYDROCHLORIDE 30 MG: 30 TABLET, FILM COATED ORAL at 06:15

## 2023-04-22 RX ADMIN — METOPROLOL SUCCINATE 25 MG: 25 TABLET, EXTENDED RELEASE ORAL at 10:12

## 2023-04-22 RX ADMIN — DILTIAZEM HYDROCHLORIDE 60 MG: 60 TABLET, FILM COATED ORAL at 10:12

## 2023-04-22 RX ADMIN — CLOPIDOGREL BISULFATE 75 MG: 75 TABLET ORAL at 10:33

## 2023-04-22 RX ADMIN — ENOXAPARIN SODIUM 40 MG: 100 INJECTION SUBCUTANEOUS at 10:11

## 2023-04-22 RX ADMIN — INSULIN LISPRO 4 UNITS: 100 INJECTION, SOLUTION INTRAVENOUS; SUBCUTANEOUS at 11:58

## 2023-04-22 RX ADMIN — METOPROLOL TARTRATE 5 MG: 5 INJECTION INTRAVENOUS at 10:13

## 2023-04-22 RX ADMIN — METHIMAZOLE 10 MG: 5 TABLET ORAL at 20:57

## 2023-04-22 RX ADMIN — TAMSULOSIN HYDROCHLORIDE 0.4 MG: 0.4 CAPSULE ORAL at 10:12

## 2023-04-22 RX ADMIN — METHIMAZOLE 10 MG: 5 TABLET ORAL at 13:00

## 2023-04-22 RX ADMIN — METOPROLOL SUCCINATE 25 MG: 25 TABLET, EXTENDED RELEASE ORAL at 20:48

## 2023-04-22 RX ADMIN — DILTIAZEM HYDROCHLORIDE 30 MG: 30 TABLET, FILM COATED ORAL at 00:13

## 2023-04-22 RX ADMIN — INSULIN DETEMIR 15 UNITS: 100 INJECTION, SOLUTION SUBCUTANEOUS at 20:49

## 2023-04-22 RX ADMIN — DILTIAZEM HYDROCHLORIDE 60 MG: 60 TABLET, FILM COATED ORAL at 23:45

## 2023-04-22 RX ADMIN — METHIMAZOLE 5 MG: 5 TABLET ORAL at 06:15

## 2023-04-22 NOTE — PROGRESS NOTES
[facility]     Cardiology Progress Note    Patient Name: Cristi Maloney  : 1940  MRN: [id number]  Primary Care Physician:  Gonzalo Nugent MD  Date of admission: 2023    Subjective   Subjective     Chief Complaint: Follow-up visit for atrial fibrillation/tachycardia    Interval HPI:    Patient reports feeling weak and tired and continues to cough.  Shortness of breath improved.  This morning, he had another episode of narrow complex irregular tachycardia with heart rates going up to 160s to 180s.  Received IV metoprolol    Review of Systems   All systems were reviewed and negative except for: Cough, weakness, shortness of breath and palpitations.  Negative for chest pain    Objective   Objective     Vitals:   Temp:  [98.1 °F (36.7 °C)-98.7 °F (37.1 °C)] 98.1 °F (36.7 °C)  Heart Rate:  [] 89  Resp:  [16-24] 22  BP: ()/() 133/76  Flow (L/min):  [2] 2  Physical Exam      General : Alert, awake, no acute distress, surgical scar on left shoulder  CVS : Regular rate and rhythm, no murmur, rubs or gallops  Lungs: Clear to auscultation bilaterally, no crackles or rhonchi  Abdomen: Soft, nontender, bowel sounds heard in all 4 quadrants  Extremities: Warm, well-perfused, no pedal edema    Scheduled Meds:cefTRIAXone, 1 g, Intravenous, Q24H  clopidogrel, 75 mg, Oral, Daily  dilTIAZem, 60 mg, Oral, Q6H  enoxaparin, 40 mg, Subcutaneous, Q24H  insulin detemir, 15 Units, Subcutaneous, Q12H  insulin lispro, 2-9 Units, Subcutaneous, TID With Meals  levalbuterol, 1.25 mg, Nebulization, Q6H - RT  methIMAzole, 10 mg, Oral, Q8H  metoprolol succinate XL, 25 mg, Oral, Q12H  montelukast, 10 mg, Oral, Nightly  senna-docusate sodium, 2 tablet, Oral, BID  sodium chloride, 10 mL, Intravenous, Q12H  sodium chloride, 10 mL, Intravenous, Q12H  sodium chloride, 4 mL, Nebulization, BID - RT  tamsulosin, 0.4 mg, Oral, Daily      Continuous Infusions:Pharmacy to Dose enoxaparin (LOVENOX),            Result Review     Result Review:  I have personally reviewed the results from the time of this admission to 4/22/2023 16:21 EDT and agree with these findings:  [x]  Laboratory  []  Microbiology  [x]  Radiology  [x]  EKG/Telemetry   [x]  Cardiology/Vascular   []  Pathology  []  Old records  []  Other:  Most notable findings include:     CBC        4/19/2023    06:30 4/19/2023    10:10 4/19/2023    23:31 4/21/2023    03:56 4/22/2023    03:35   CBC   WBC 17.84   16.05   16.77   14.29   13.02     RBC 4.48   4.04   3.29   2.91   3.37     Hemoglobin 14.2   12.9   10.6   9.3   10.8     Hematocrit 42.7   38.4   30.4   27.2   31.5     MCV 95.3   95.0   92.4   93.5   93.5     MCH 31.7   31.9   32.2   32.0   32.0     MCHC 33.3   33.6   34.9   34.2   34.3     RDW 13.8   13.8   14.1   14.7   14.5     Platelets 330   299   215   183   203       CMP        4/20/2023    03:21 4/20/2023    07:52 4/20/2023    12:40 4/20/2023    16:29 4/20/2023    19:51   CMP   Glucose 182   196   196   164   127     BUN 27   24   21   18   18     Creatinine 0.80   0.69   0.62   0.61   0.62     EGFR 88.9   93.0   96.0   96.5   96.0     Sodium 144   141   142   140   141     Potassium 4.3   4.0   4.2   4.1   3.9     Chloride 118   117   116   116   114     Calcium 8.4   8.5   8.5   8.2   8.5     Total Protein        Albumin        Total Bilirubin        Alkaline Phosphatase        AST (SGOT)        ALT (SGPT)        BUN/Creatinine Ratio 33.8   34.8   33.9   29.5   29.0     Anion Gap 8.6   8.8   9.4   6.9   8.3             4/20/2023    23:49 4/21/2023    03:56 4/21/2023    10:43 4/21/2023    12:29 4/21/2023    15:56   CMP   Glucose 124   145   119   122   169     BUN 16   15   13   14   14     Creatinine 0.60   0.53   0.55   0.50   0.55     EGFR 97.0   100.7   99.6   102.5   99.6     Sodium 140   139   137   132   135     Potassium 3.7   4.0   3.9   4.2   4.6     Chloride 113   111   109   105   105     Calcium 8.5   8.3   8.5   8.4   8.7     Total Protein 4.8          Albumin 2.1         Total Bilirubin 0.8         Alkaline Phosphatase 52         AST (SGOT) 25         ALT (SGPT) 14         BUN/Creatinine Ratio 26.7   28.3   23.6   28.0   25.5     Anion Gap 7.6   10.0   8.6   9.3   13.4             4/21/2023    20:22 4/22/2023    03:35   CMP   Glucose 166   145     BUN 15   15     Creatinine 0.51   0.53     EGFR 101.9   100.7     Sodium 134   137     Potassium 4.3   4.4     Chloride 103   103     Calcium 8.7   9.2     Total Protein  5.6     Albumin  2.5     Total Bilirubin  0.9     Alkaline Phosphatase  67     AST (SGOT)  31     ALT (SGPT)  22     BUN/Creatinine Ratio 29.4   28.3     Anion Gap 12.9   13.4        CARDIAC LABS:      Lab 04/19/23  1300 04/19/23  1114 04/19/23  0630   PROBNP  --   --  2,283.0*   HSTROP T 91* 89* 99*   PROTIME  --   --  15.1*   INR  --   --  1.19*      Results for orders placed during the hospital encounter of 04/19/23    Adult Transthoracic Echo Complete W/ Cont if Necessary Per Protocol    Interpretation Summary  •  Technically difficult study.  •  Left ventricular ejection fraction appears to be 51 - 55%.  No obvious regional wall motion abnormalities.  •  Left ventricular diastolic function is consistent with (grade I) impaired relaxation and age.  •  Mild aortic valve stenosis is present with max/mean pressure gradient 20/13 mmHg..      Assessment & Plan   Assessment / Plan     Brief Patient Summary:  Cristi Maloney is a 82 y.o. male with diabetes, coronary artery disease, previous angioplasty, recent shoulder surgery on 4/10/2022, who was found down at home on 4/19/2022.  Tachycardia per EMS with failed cardioversion.  Subsequent diagnosed DKA, bilateral pneumonia and hypothyroidism.  Rhythm eventually converted normal sinus rhythm but continues to have intermittent episodes.    Active Hospital Problems:  Active Hospital Problems    Diagnosis    • **Sepsis      Multifocal pneumonia  Diabetes mellitus with ketoacidosis on admission    Paroxysmal  atrial fibrillation with RVR : Episodes likely triggered by hyperthyroid state  Intermittent multifocal atrial tachycardia  Thyrotoxicosis : Recently high levels of T4  Hypoxic respiratory failure  Coronary artery disease : Devious multivessel angioplasty, stable with no angina    Plan:     Beta-blockers are better suited for keeping the heart rate under control  due to hyperthyroid state and acute systemic illness    Toprol-XL started this morning, will increase the dose to 25 mg twice daily  Titrate the dose up as needed    Continue methimazole per primary team  Antibiotics per hospitalist and intensivist    Discussed with Dr. Meier    CODE STATUS:   Level Of Support Discussed With: Patient  Code Status (Patient has no pulse and is not breathing): CPR (Attempt to Resuscitate)  Medical Interventions (Patient has pulse or is breathing): Full Support  Release to patient: Routine Release      Electronically signed by Deniz Falcon MD, 04/22/23, 4:21 PM EDT.

## 2023-04-22 NOTE — PROGRESS NOTES
[facility]   Hospitalist Progress Note  Date: 2023  Patient Name: Cristi Maloney  : 1940  MRN: [id number]  Date of admission: 2023      Subjective   Subjective     Chief complaint: Altered mental status, shortness of breath     Summary:  82-year-old male with history of diabetes, ex-smoker, with recent left shoulder surgery, was found down by his wife, short of air, tachycardic, tachypneic, found to be in DKA as well as A-fib with RVR, bilateral pneumonia, placed on BiPAP, hypotension, admitted to the ICU, critical care consulted, cardiology consulted, failed several rounds of cardioversion, concern for thyrotoxicosis, primary hypothyroidism, started on PTU with transition to methimazole.  Strep pneumo antigen positive     Interval follow-up: Patient seen and examined, no acute distress, no acute major overnight events, patient had a heart rate in the 150s to 170s, he was in A-fib with RVR, he snapped into it and could not snap out of it.  He was given 2 doses of IV metoprolol and he converted back to sinus rhythm.  He continues to have significant coughing fits and shortness of breath with exertion.  His O2 requirement is slightly improved.  No dizziness or lightheadedness, does have significant weakness and fatigue.  His left shoulder still has pain.  Telemetry reviewed, no acute major arrhythmic events other than having atrial fibrillation uncontrolled.  Blood sugars are elevated, 200 range, making adjustments to long-acting insulin.  Potassium 4.4, sodium 137, magnesium 1.9, white blood cell count 13,000.     Review of systems:  All systems reviewed and negative except for generalized fatigue, shortness of breath with exertion, coughing fits    Objective   Objective     Vitals:   Temp:  [98.1 °F (36.7 °C)-99.1 °F (37.3 °C)] 98.1 °F (36.7 °C)  Heart Rate:  [] 94  Resp:  [16-24] 22  BP: (117-147)/() 117/61  Flow (L/min):  [2] 2  Physical Exam    Constitutional: Alert, awake,  oriented, answering questions with nasal cannula oxygen              Eyes: Pupils equal, sclerae anicteric              HENT: NCAT, mucous membranes moist              Neck: Supple, no thyromegaly              Respiratory: Air entry bilaterally, coarse breath sounds throughout, scattered rhonchi              Cardiovascular: irregular rate, irregular rhythm              Gastrointestinal: Positive bowel sounds, soft, nontender, nondistended              Musculoskeletal: left shoulder incision clean and arm is in immobilizer,               Psychiatric: Pleasant mood and affect              Neurologic: Alert and awake and oriented, cranial nerves II through XII grossly intact to confrontation              Skin: No rashes    Result Review    Result Review:  I have personally reviewed the pertinent results from the past 24 hours to 4/22/2023 12:46 EDT and agree with these findings:  [x]  Laboratory   CBC        4/19/2023    06:30 4/19/2023    10:10 4/19/2023    23:31 4/21/2023    03:56 4/22/2023    03:35   CBC   WBC 17.84   16.05   16.77   14.29   13.02     RBC 4.48   4.04   3.29   2.91   3.37     Hemoglobin 14.2   12.9   10.6   9.3   10.8     Hematocrit 42.7   38.4   30.4   27.2   31.5     MCV 95.3   95.0   92.4   93.5   93.5     MCH 31.7   31.9   32.2   32.0   32.0     MCHC 33.3   33.6   34.9   34.2   34.3     RDW 13.8   13.8   14.1   14.7   14.5     Platelets 330   299   215   183   203       BMP        4/20/2023    03:21 4/20/2023    07:52 4/20/2023    12:40 4/20/2023    16:29 4/20/2023    19:51   BMP   BUN 27   24   21   18   18     Creatinine 0.80   0.69   0.62   0.61   0.62     Sodium 144   141   142   140   141     Potassium 4.3   4.0   4.2   4.1   3.9     Chloride 118   117   116   116   114     CO2 17.4   15.2   16.6   17.1   18.7     Calcium 8.4   8.5   8.5   8.2   8.5             4/20/2023    23:49 4/21/2023    03:56 4/21/2023    10:43 4/21/2023    12:29 4/21/2023    15:56   BMP   BUN 16   15   13   14   14      Creatinine 0.60   0.53   0.55   0.50   0.55     Sodium 140   139   137   132   135     Potassium 3.7   4.0   3.9   4.2   4.6     Chloride 113   111   109   105   105     CO2 19.4   18.0   19.4   17.7   16.6     Calcium 8.5   8.3   8.5   8.4   8.7             4/21/2023    20:22 4/22/2023    03:35   BMP   BUN 15   15     Creatinine 0.51   0.53     Sodium 134   137     Potassium 4.3   4.4     Chloride 103   103     CO2 18.1   20.6     Calcium 8.7   9.2       LIVER FUNCTION TESTS:      Lab 04/22/23  0335 04/20/23  2349 04/19/23  2331 04/19/23  0630   TOTAL PROTEIN 5.6* 4.8* 5.0* 6.9   ALBUMIN 2.5* 2.1* 2.4* 3.3*   GLOBULIN  --   --  2.6 3.6   ALT (SGPT) 22 14 10 11   AST (SGOT) 31 25 19 10   BILIRUBIN 0.9 0.8 0.7 1.3*   INDIRECT BILIRUBIN 0.6 0.5  --   --    BILIRUBIN DIRECT 0.3 0.3  --   --    ALK PHOS 67 52 46 68       [x]  Microbiology   Blood Culture   Date Value Ref Range Status   04/19/2023 No growth at 2 days  Preliminary   04/19/2023 No growth at 2 days  Preliminary     No results found for: BCIDPCR, CXREFLEX, CSFCX, CULTURETIS  No results found for: CULTURES, HSVCX, URCX  No results found for: EYECULTURE, GCCX, HSVCULTURE, LABHSV  No results found for: LEGIONELLA, MRSACX, MUMPSCX, MYCOPLASCX  No results found for: NOCARDIACX, STOOLCX  No results found for: THROATCX, UNSTIMCULT, URINECX, CULTURE, VZVCULTUR  No results found for: VIRALCULTU, WOUNDCX    [x]  Radiology Adult Transthoracic Echo Complete W/ Cont if Necessary Per Protocol    Result Date: 4/20/2023  •  Technically difficult study. •  Left ventricular ejection fraction appears to be 51 - 55%.  No obvious regional wall motion abnormalities. •  Left ventricular diastolic function is consistent with (grade I) impaired relaxation and age. •  Mild aortic valve stenosis is present with max/mean pressure gradient 20/13 mmHg..     CT Chest With Contrast Diagnostic    Result Date: 4/19/2023  PROCEDURE: CT CHEST W CONTRAST DIAGNOSTIC  COMPARISON:  None  INDICATIONS: shortness of breath  TECHNIQUE: After obtaining the patient's consent, CT images were obtained with non-ionic intravenous contrast material.   PROTOCOL:   Pulmonary embolism imaging protocol performed    RADIATION:   DLP: 525.7mGy*cm   Automated exposure control was utilized to minimize radiation dose. CONTRAST: 100cc Isovue 370 I.V.  FINDINGS:  There is advanced emphysematous lung disease.  There is airspace disease dependently in both lower lobes.  No nodules or masses are identified.  There is atherosclerotic disease in the aorta and coronary arteries.  There is no mediastinal lymphadenopathy.  There are enlarged left hilar nodes measuring 2.3 by 1.3 cm.  There are borderline enlarged right hilar nodes.  Densely calcified nodes are present in the left hilum.  No acute findings are present in the upper abdomen.  Degenerative changes are present in the dorsal spine without definite bone destruction.  There are postoperative changes of left shoulder arthroplasty.  The aorta is of normal caliber.  There is significant motion degradation in the lung bases.  There are no obvious filling defects within the main or proximal pulmonary arteries.        1. Significant motion artifact is present in the bases.  No central pulmonary emboli are identified.  2. Extensive consolidation in both lower lobes raising the question of aspiration. 3. Advanced emphysematous lung disease 4. Extensive coronary artery atherosclerotic calcifications. 5. Left hilar lymphadenopathy with a 2.3 cm node.  There are densely calcified nodes in the left hilum as well.  Smaller nodes are present within the right hilum.  There is no mediastinal lymphadenopathy. 6. Postop changes of recent left shoulder arthroplasty.     Jordan Marrero MD       Electronically Signed and Approved By: Jordan Marrero MD on 4/19/2023 at 7:46             XR Chest 1 View    Result Date: 4/19/2023  PROCEDURE: XR CHEST 1 VW  COMPARISON: [facility],  CR, CHEST AP/PA 1 VIEW, 2013, 16:00.  INDICATIONS: Dysrhythmia  FINDINGS:  Infiltrates are noted in the lung bases.  The cardiac silhouette and mediastinum are stable.  Aortic atherosclerosis is noted.  No acute osseous abnormalities are observed.        1. Bibasilar infiltrates are noted.  The findings may relate to bibasilar pneumonia.  Changes of CHF and pulmonary edema could also have this appearance.       ANAYA FELDMAN MD       Electronically Signed and Approved By: ANAYA FELDMAN MD on 2023 at 6:46             XR Shoulder 1 Vw LT    Result Date: 4/10/2023  RE[facility]ING YOUR TEST RESULTS [facility] IS NOT A SUBSTITUTE FOR DISCUSSING THOSE RESULTS WITH YOUR HEALTH CARE PROVIDER. PLEASE CONTACT YOUR PROVIDER VIA [facility] TO DISCUSS ANY QUESTIONS OR CONCERNS YOU MAY HAVE REGARDING THESE TEST RESULTS.  RADIOLOGY REPORT FACILITY:  [facility] UNIT/AGE/GENDER: JEAN-PIERREPERIOP  OP      AGE:82 Y          SEX:M PATIENT NAME/:  DAPHNE BUSBY SON    1940 UNIT NUMBER:  [id number] ACCOUNT NUMBER:  [id number] ACCESSION NUMBER:  [id number] PORTABLE AP LEFT SHOULDER 1328 hours DATE: 04/10/2023 COMPARISON: 2023 INDICATION: Status post shoulder joint replacement. IMPRESSION: Patient is status post reverse total left shoulder arthroplasty. Prosthesis is in expected position and alignment in this single view. Negative for periprosthetic fracture. Surgical drain in place. Dictated by: Marlo Winters M.D. Images and Report reviewed and interpreted by: Marlo Winters M.D. <PS><Electronically signed by: Marlo Winters M.D.> 04/10/2023 1349 D: 04/10/2023 1348 T: 04/10/2023 1348      [x]  EKG/Telemetry   []  Cardiology/Vascular   []  Pathology  [x]  Old records  []  Other:    Assessment & Plan   Assessment / Plan     Assessment/Plan:  Assessment:  Atrial fibrillation rapid ventricular response  Multifocal atrial tachycardia  Multifocal pneumonia with concern for pneumococcal  infection  Acute hypoxemic respiratory failure  Diabetic ketoacidosis  JUNIOR  Anion gap metabolic acidosis  Thyrotoxicosis/hyperthyroidism  Hypophosphatemia  Hypokalemia  Acute metabolic encephalopathy multifactorial likely related to sepsis  COPD  Dyslipidemia  Chronic diastolic CHF  Aortic valve stenosis     Plan:  Labs and imaging reviewed  Finished azithromycin today  Still going in and out of atrial fibrillation, several doses of IV metoprolol had to be Spot dose and ordered emergently  After 2 doses of IV metoprolol 5 mg, the patient converted to sinus rhythm, will closely monitor  Increase methimazole to 10 mg 3 times daily  Increase Cardizem to 60 mg to every 6 hours  Start metoprolol oral XL 25 mg daily  Continue ICU level monitoring  Continue to wean oxygen per tolerance  Increase Levemir to 15 units twice daily  Continue insulin sliding scale coverage  Continue Plavix 75 mg daily  Cardiology recommendations appreciated  Discussed with critical care Dr. Meier, recommendations appreciated  Continue ceftriaxone 1 g daily to complete 7 days total  We will need TSH and free T4 reevaluated in the next few days  Tamsulosin 0.4 mg daily continued  Continue telemetry monitoring  A.m. labs  Full code  DVT prophylaxis with Lovenox  Clinical course dictate further management of this critically ill patient in the intensive care unit  Discussed with nurse at the bedside    DVT prophylaxis:  Medical and mechanical DVT prophylaxis orders are present.    CODE STATUS:   Level Of Support Discussed With: Patient  Code Status (Patient has no pulse and is not breathing): CPR (Attempt to Resuscitate)  Medical Interventions (Patient has pulse or is breathing): Full Support  Release to patient: Routine Release      Electronically signed by Daphne Washington MD, 04/22/23, 12:46 PM EDT.    Portions of this documentation were transcribed electronically from a voice recognition software.  I confirm all data accurately represents the  service(s) I performed at today's visit.

## 2023-04-22 NOTE — PROGRESS NOTES
Reason for consultation critical care management    Patient critically ill in the intensive care unit  In A-fib/flutter with RVR with rate in 150s to 160s.  Asymptomatic.  Received Cardizem 10 mg IV x1 without resolution.  Lopressor 5 mg IV x2-then heart rate improved with rate into the 90s;   In bed with head of bed elevated, interacting with staff  Family member at bedside  Continues to work with speech therapy, able to have modified diet        Vitals:    04/22/23 0900 04/22/23 0956 04/22/23 1000 04/22/23 1100   BP: 147/68 135/78 (!) 142/104 117/61   Pulse: 113 (!) 202 (!) 165 (!) 158   Resp:       Temp:       TempSrc:       SpO2: 93%  92% 94%   Weight:       Height:           Physical Exam  Acutely ill male on nasal cannula  Mental status much improved  A-fib, currently rate controlled after IV pushes and oral meds  Abdomen is soft      Brief Patient Summary:  Cristi Maloney is a 82 y.o. male who critically ill in the intensive care unit with respiratory failure due to multifocal pneumonia, sepsis and rapid A-fib with DKA     Active Hospital Problems:       Active Hospital Problems     Diagnosis     • **Sepsis     Rapid atrial fibrillation with RVR  Multifocal pneumonia concern for pneumococcal pneumonia  Acute hypoxic respiratory failure  Diabetic ketoacidosis  Acute kidney injury likely prerenal  Anion gap metabolic acidosis  Primary hyperhyroidism/thyrotoxicosis  Hypophosphatemia  Hypokalemia  Altered mental status likely metabolic encephalopathy from sepsis  COPD with centrilobular emphysema    Plan:   Obtain chest xray today  Continue p.o. diltiazem.  Metoprolol 25 mg p.o. daily added  Patient with episode of A-fib/flutter with AVR this AM.  Patient required diltiazem 10 mg IV x2 and Lopressor 5 mg IV x2 to have improvement with heart rate  Continue methimazole.  We will repeat T4 in a.m.  Continue on Levemir and sliding scale insulin  Speech therapy on board.  They continue to work with patient in regards  to swallow.  Currently on modified diet  Continue antibiotic coverage with Rocephin to complete 7-day treatment.  On 3/3 of azithromycin today  Continue scheduled Xopenex  Trend renal function  DVT prophylaxis with Lovenox     Patient is critically ill in ICU with  sepsis, rapid atrial fibrillation, multifocal pneumonia, acute hypoxic respiratory failure, acute kidney injury, thyrotoxicosis.  Patient is critically ill with respiratory failure, rapid A-fib, multifocal pneumonia, DKA, renal failure. We have personally reviewed all pertinent labs, imaging, microbiology and documentation. We have discussed care with the primary service as well as at multidisciplinary critical care rounds with the bedside nurse, respiratory therapist, pharmacist and all other ancillary services.41minutes of critical care time was spent managing this patient, excluding procedures. Of this time, I [id number] and JARROD Nye [id number] in accordance with split shared billing.      Electronically signed by JARROD Delaney, 04/22/23, 12:22 PM EDT.    Electronically signed by Maurice Meier DO, 04/22/23, 7:06 PM EDT.

## 2023-04-22 NOTE — PLAN OF CARE
Goal Outcome Evaluation:  Plan of Care Reviewed With: patient        Progress: improving  Outcome Evaluation: Pt was going to transfer out of ICU today, but had an event early in shift where he went into SVT; asymptomatic entire time.  Attending and Intensivist MDs were in unit at time.  Pt converted at 1158 with IV push meds, no furhter intervention necessary.  PO meds changed per MD orders for future prevention of SVT episodes.  Pt kept in unit after converting for further observation.  Pt has had family at bedside all day.  VSS.

## 2023-04-22 NOTE — PLAN OF CARE
Goal Outcome Evaluation:    CATRACHITA overnight. VSS. Patient safety maintained. ID band on, bed in low position, wheels locked, call light within reach. Hourly rounding.     MILLER RN/

## 2023-04-22 NOTE — PLAN OF CARE
Goal Outcome Evaluation:   Pt did not wear bipap  last night. Pt wears 2lnc with SATS >93%. Pt only has bipap prn .

## 2023-04-23 LAB
ALBUMIN SERPL-MCNC: 2.7 G/DL (ref 3.5–5.2)
ALP SERPL-CCNC: 76 U/L (ref 39–117)
ALT SERPL W P-5'-P-CCNC: 23 U/L (ref 1–41)
ANION GAP SERPL CALCULATED.3IONS-SCNC: 11.8 MMOL/L (ref 5–15)
AST SERPL-CCNC: 25 U/L (ref 1–40)
BASOPHILS # BLD AUTO: 0.06 10*3/MM3 (ref 0–0.2)
BASOPHILS NFR BLD AUTO: 0.6 % (ref 0–1.5)
BILIRUB CONJ SERPL-MCNC: 0.2 MG/DL (ref 0–0.3)
BILIRUB INDIRECT SERPL-MCNC: 0.6 MG/DL
BILIRUB SERPL-MCNC: 0.8 MG/DL (ref 0–1.2)
BUN SERPL-MCNC: 14 MG/DL (ref 8–23)
BUN/CREAT SERPL: 26.9 (ref 7–25)
CALCIUM SPEC-SCNC: 9.3 MG/DL (ref 8.6–10.5)
CHLORIDE SERPL-SCNC: 100 MMOL/L (ref 98–107)
CO2 SERPL-SCNC: 24.2 MMOL/L (ref 22–29)
CREAT SERPL-MCNC: 0.52 MG/DL (ref 0.76–1.27)
DEPRECATED RDW RBC AUTO: 49.1 FL (ref 37–54)
EGFRCR SERPLBLD CKD-EPI 2021: 101.3 ML/MIN/1.73
EOSINOPHIL # BLD AUTO: 0.06 10*3/MM3 (ref 0–0.4)
EOSINOPHIL NFR BLD AUTO: 0.6 % (ref 0.3–6.2)
ERYTHROCYTE [DISTWIDTH] IN BLOOD BY AUTOMATED COUNT: 14.3 % (ref 12.3–15.4)
GLUCOSE BLDC GLUCOMTR-MCNC: 175 MG/DL (ref 70–99)
GLUCOSE BLDC GLUCOMTR-MCNC: 243 MG/DL (ref 70–99)
GLUCOSE BLDC GLUCOMTR-MCNC: 271 MG/DL (ref 70–99)
GLUCOSE BLDC GLUCOMTR-MCNC: 275 MG/DL (ref 70–99)
GLUCOSE SERPL-MCNC: 187 MG/DL (ref 65–99)
HCT VFR BLD AUTO: 34.4 % (ref 37.5–51)
HGB BLD-MCNC: 11.5 G/DL (ref 13–17.7)
IMM GRANULOCYTES # BLD AUTO: 0.66 10*3/MM3 (ref 0–0.05)
IMM GRANULOCYTES NFR BLD AUTO: 6.3 % (ref 0–0.5)
LYMPHOCYTES # BLD AUTO: 1.44 10*3/MM3 (ref 0.7–3.1)
LYMPHOCYTES NFR BLD AUTO: 13.8 % (ref 19.6–45.3)
MAGNESIUM SERPL-MCNC: 1.8 MG/DL (ref 1.6–2.4)
MCH RBC QN AUTO: 31.3 PG (ref 26.6–33)
MCHC RBC AUTO-ENTMCNC: 33.4 G/DL (ref 31.5–35.7)
MCV RBC AUTO: 93.7 FL (ref 79–97)
MONOCYTES # BLD AUTO: 0.74 10*3/MM3 (ref 0.1–0.9)
MONOCYTES NFR BLD AUTO: 7.1 % (ref 5–12)
NEUTROPHILS NFR BLD AUTO: 7.45 10*3/MM3 (ref 1.7–7)
NEUTROPHILS NFR BLD AUTO: 71.6 % (ref 42.7–76)
NRBC BLD AUTO-RTO: 0.3 /100 WBC (ref 0–0.2)
PHOSPHATE SERPL-MCNC: 3.9 MG/DL (ref 2.5–4.5)
PLATELET # BLD AUTO: 239 10*3/MM3 (ref 140–450)
PMV BLD AUTO: 10.2 FL (ref 6–12)
POTASSIUM SERPL-SCNC: 4 MMOL/L (ref 3.5–5.2)
PROT SERPL-MCNC: 6.1 G/DL (ref 6–8.5)
RBC # BLD AUTO: 3.67 10*6/MM3 (ref 4.14–5.8)
RBC MORPH BLD: NORMAL
SMALL PLATELETS BLD QL SMEAR: ADEQUATE
SODIUM SERPL-SCNC: 136 MMOL/L (ref 136–145)
T-UPTAKE NFR SERPL: 0.79 TBI (ref 0.8–1.3)
T4 FREE SERPL-MCNC: 1.75 NG/DL (ref 0.93–1.7)
T4 SERPL-MCNC: 8.72 MCG/DL (ref 4.5–11.7)
TSH SERPL DL<=0.05 MIU/L-ACNC: 0.06 UIU/ML (ref 0.27–4.2)
WBC MORPH BLD: NORMAL
WBC NRBC COR # BLD: 10.41 10*3/MM3 (ref 3.4–10.8)

## 2023-04-23 PROCEDURE — 94664 DEMO&/EVAL PT USE INHALER: CPT

## 2023-04-23 PROCEDURE — 84439 ASSAY OF FREE THYROXINE: CPT | Performed by: PHYSICIAN ASSISTANT

## 2023-04-23 PROCEDURE — 99233 SBSQ HOSP IP/OBS HIGH 50: CPT | Performed by: FAMILY MEDICINE

## 2023-04-23 PROCEDURE — 25010000002 CEFTRIAXONE PER 250 MG: Performed by: INTERNAL MEDICINE

## 2023-04-23 PROCEDURE — 80076 HEPATIC FUNCTION PANEL: CPT | Performed by: FAMILY MEDICINE

## 2023-04-23 PROCEDURE — 25010000002 MAGNESIUM SULFATE 2 GM/50ML SOLUTION: Performed by: PHYSICIAN ASSISTANT

## 2023-04-23 PROCEDURE — 94761 N-INVAS EAR/PLS OXIMETRY MLT: CPT

## 2023-04-23 PROCEDURE — 97162 PT EVAL MOD COMPLEX 30 MIN: CPT

## 2023-04-23 PROCEDURE — 97530 THERAPEUTIC ACTIVITIES: CPT

## 2023-04-23 PROCEDURE — 94799 UNLISTED PULMONARY SVC/PX: CPT

## 2023-04-23 PROCEDURE — 63710000001 INSULIN LISPRO (HUMAN) PER 5 UNITS: Performed by: INTERNAL MEDICINE

## 2023-04-23 PROCEDURE — 99232 SBSQ HOSP IP/OBS MODERATE 35: CPT | Performed by: INTERNAL MEDICINE

## 2023-04-23 PROCEDURE — 99233 SBSQ HOSP IP/OBS HIGH 50: CPT | Performed by: INTERNAL MEDICINE

## 2023-04-23 PROCEDURE — 84479 ASSAY OF THYROID (T3 OR T4): CPT | Performed by: FAMILY MEDICINE

## 2023-04-23 PROCEDURE — 85007 BL SMEAR W/DIFF WBC COUNT: CPT | Performed by: FAMILY MEDICINE

## 2023-04-23 PROCEDURE — 84100 ASSAY OF PHOSPHORUS: CPT | Performed by: FAMILY MEDICINE

## 2023-04-23 PROCEDURE — 85025 COMPLETE CBC W/AUTO DIFF WBC: CPT | Performed by: FAMILY MEDICINE

## 2023-04-23 PROCEDURE — 25010000002 ENOXAPARIN PER 10 MG: Performed by: INTERNAL MEDICINE

## 2023-04-23 PROCEDURE — 82962 GLUCOSE BLOOD TEST: CPT

## 2023-04-23 PROCEDURE — 84443 ASSAY THYROID STIM HORMONE: CPT | Performed by: FAMILY MEDICINE

## 2023-04-23 PROCEDURE — 63710000001 INSULIN DETEMIR PER 5 UNITS: Performed by: FAMILY MEDICINE

## 2023-04-23 PROCEDURE — 80048 BASIC METABOLIC PNL TOTAL CA: CPT | Performed by: FAMILY MEDICINE

## 2023-04-23 PROCEDURE — 83735 ASSAY OF MAGNESIUM: CPT | Performed by: FAMILY MEDICINE

## 2023-04-23 RX ORDER — METOPROLOL SUCCINATE 50 MG/1
50 TABLET, EXTENDED RELEASE ORAL EVERY 12 HOURS SCHEDULED
Status: DISCONTINUED | OUTPATIENT
Start: 2023-04-23 | End: 2023-04-26 | Stop reason: HOSPADM

## 2023-04-23 RX ORDER — MAGNESIUM SULFATE HEPTAHYDRATE 40 MG/ML
2 INJECTION, SOLUTION INTRAVENOUS ONCE
Status: COMPLETED | OUTPATIENT
Start: 2023-04-23 | End: 2023-04-23

## 2023-04-23 RX ADMIN — INSULIN DETEMIR 15 UNITS: 100 INJECTION, SOLUTION SUBCUTANEOUS at 08:28

## 2023-04-23 RX ADMIN — METOPROLOL SUCCINATE 50 MG: 50 TABLET, EXTENDED RELEASE ORAL at 20:44

## 2023-04-23 RX ADMIN — Medication 4 ML: at 07:05

## 2023-04-23 RX ADMIN — CLOPIDOGREL BISULFATE 75 MG: 75 TABLET ORAL at 08:29

## 2023-04-23 RX ADMIN — TAMSULOSIN HYDROCHLORIDE 0.4 MG: 0.4 CAPSULE ORAL at 08:33

## 2023-04-23 RX ADMIN — LEVALBUTEROL HYDROCHLORIDE 1.25 MG: 1.25 SOLUTION RESPIRATORY (INHALATION) at 00:24

## 2023-04-23 RX ADMIN — Medication 10 ML: at 08:29

## 2023-04-23 RX ADMIN — Medication 10 ML: at 20:49

## 2023-04-23 RX ADMIN — METHIMAZOLE 10 MG: 5 TABLET ORAL at 20:44

## 2023-04-23 RX ADMIN — SENNOSIDES AND DOCUSATE SODIUM 2 TABLET: 8.6; 5 TABLET ORAL at 08:33

## 2023-04-23 RX ADMIN — METHIMAZOLE 10 MG: 5 TABLET ORAL at 05:38

## 2023-04-23 RX ADMIN — MONTELUKAST 10 MG: 10 TABLET, FILM COATED ORAL at 20:44

## 2023-04-23 RX ADMIN — INSULIN LISPRO 2 UNITS: 100 INJECTION, SOLUTION INTRAVENOUS; SUBCUTANEOUS at 08:29

## 2023-04-23 RX ADMIN — INSULIN DETEMIR 15 UNITS: 100 INJECTION, SOLUTION SUBCUTANEOUS at 20:43

## 2023-04-23 RX ADMIN — DILTIAZEM HYDROCHLORIDE 60 MG: 60 TABLET, FILM COATED ORAL at 05:38

## 2023-04-23 RX ADMIN — METOPROLOL SUCCINATE 50 MG: 50 TABLET, EXTENDED RELEASE ORAL at 08:29

## 2023-04-23 RX ADMIN — MAGNESIUM SULFATE HEPTAHYDRATE 2 G: 40 INJECTION, SOLUTION INTRAVENOUS at 08:29

## 2023-04-23 RX ADMIN — INSULIN LISPRO 4 UNITS: 100 INJECTION, SOLUTION INTRAVENOUS; SUBCUTANEOUS at 17:17

## 2023-04-23 RX ADMIN — CEFTRIAXONE SODIUM 1 G: 1 INJECTION, SOLUTION INTRAVENOUS at 12:29

## 2023-04-23 RX ADMIN — LEVALBUTEROL HYDROCHLORIDE 1.25 MG: 1.25 SOLUTION RESPIRATORY (INHALATION) at 07:06

## 2023-04-23 RX ADMIN — Medication 4 ML: at 19:03

## 2023-04-23 RX ADMIN — Medication 10 ML: at 20:44

## 2023-04-23 RX ADMIN — LEVALBUTEROL HYDROCHLORIDE 1.25 MG: 1.25 SOLUTION RESPIRATORY (INHALATION) at 19:02

## 2023-04-23 RX ADMIN — ENOXAPARIN SODIUM 40 MG: 100 INJECTION SUBCUTANEOUS at 10:48

## 2023-04-23 RX ADMIN — Medication 10 ML: at 08:30

## 2023-04-23 RX ADMIN — INSULIN LISPRO 6 UNITS: 100 INJECTION, SOLUTION INTRAVENOUS; SUBCUTANEOUS at 12:28

## 2023-04-23 RX ADMIN — SENNOSIDES AND DOCUSATE SODIUM 2 TABLET: 8.6; 5 TABLET ORAL at 20:44

## 2023-04-23 RX ADMIN — METHIMAZOLE 10 MG: 5 TABLET ORAL at 15:19

## 2023-04-23 NOTE — THERAPY EVALUATION
Acute Care - Physical Therapy Initial Evaluation   Piyush     Patient Name: Cristi Maloney  : 1940  MRN: [id number]  Today's Date: 2023   Onset of Illness/Injury or Date of Surgery: 23  Visit Dx:     ICD-10-CM ICD-9-CM   1. Atrial fibrillation with rapid ventricular response  I48.91 427.31   2. Septic shock  A41.9 038.9    R65.21 785.52     995.92   3. Pneumonia of both lower lobes due to infectious organism  J18.9 486   4. Hypoxia  R09.02 799.02   5. Diabetic ketoacidosis without coma associated with other specified diabetes mellitus  E13.10 250.12   6. Acute respiratory failure with hypoxia  J96.01 518.81   7. Dysphagia, oropharyngeal  R13.12 787.22   8. Difficulty in walking  R26.2 719.7     Patient Active Problem List   Diagnosis   • Sepsis     Past Medical History:   Diagnosis Date   • CHF (congestive heart failure)    • Elevated cholesterol    • Hypertension      Past Surgical History:   Procedure Laterality Date   • JOINT REPLACEMENT       PT Assessment (last 12 hours)     PT Evaluation and Treatment     Row Name 23 1247          Physical Therapy Time and Intention    Subjective Information complains of;weakness;fatigue  -DW     Document Type evaluation  -DW     Mode of Treatment individual therapy;physical therapy  -DW     Patient Effort good  -DW     Symptoms Noted During/After Treatment fatigue  -DW     Row Name 23 1247          General Information    Patient Profile Reviewed yes  -DW     Onset of Illness/Injury or Date of Surgery 23  -DW     Referring Physician Daphne Washington  -ARI     Patient Observations alert;cooperative;agree to therapy  -DW     Prior Level of Function independent:;all household mobility;community mobility;ADL's  -DW     Equipment Currently Used at Home sling  -DW     Risks Reviewed patient:  -DW     Benefits Reviewed patient:;improve function;increase independence;increase strength;increase balance  -DW     Row Name 23 1247          Previous  Level of Function/Home Environm    BADLs, Premorbid Functional Level independent  -DW     IADLs, Premorbid Functional Level independent  -DW     Bed Mobility, Premorbid Functional Level independent  -DW     Transfers, Premorbid Functional Level independent  -DW     Household Ambulation, Premorbid Functional Level independent  -DW     Stairs, Premorbid Functional Level independent  -DW     Community Ambulation, Premorbid Functional Level independent  -DW     Row Name 04/23/23 1247          Living Environment    Current Living Arrangements home  -DW     People in Home spouse  -DW     Primary Care Provided by self  -DW     Row Name 04/23/23 1247          Home Use of Assistive/Adaptive Equipment    Equipment Currently Used at Home none  -DW     Row Name 04/23/23 1247          Cognition    Affect/Mental Status (Cognition) WNL  -DW     Orientation Status (Cognition) oriented x 3  -DW     Follows Commands (Cognition) WNL  -DW     Cognitive Function WNL  -DW     Row Name 04/23/23 1247          Range of Motion (ROM)    Range of Motion left upper extremity ROM  -DW     Left Upper Extremity (ROM) other (see comments)  unable to assess due to pt being in a sling with MD restrictions post op  -DW     Row Name 04/23/23 1247          Strength Comprehensive (MMT)    General Manual Muscle Testing (MMT) Assessment lower extremity strength deficits identified  -DW     Comment, General Manual Muscle Testing (MMT) Assessment 3+/5 MMT BLE  -DW     Row Name 04/23/23 1247          Bed Mobility    Bed Mobility bed mobility (all) activities  -DW     All Activities, [place] (Bed Mobility) maximum assist (25% patient effort)  -DW     Bed Mobility, Safety Issues decreased use of arms for pushing/pulling;decreased use of legs for bridging/pushing  -DW     Assistive Device (Bed Mobility) bed rails;draw sheet;head of bed elevated  -DW     Row Name 04/23/23 1247          Transfers    Transfers sit-stand transfer;stand-sit transfer;bed-chair  transfer;wheelchair transfer  -     Row Name 04/23/23 1247          Bed-Chair Transfer    Bed-Chair [place] (Transfers) maximum assist (25% patient effort)  -     Row Name 04/23/23 1247          Sit-Stand Transfer    Sit-Stand [place] (Transfers) maximum assist (25% patient effort)  -DW     Row Name 04/23/23 1247          Stand-Sit Transfer    Stand-Sit [place] (Transfers) maximum assist (25% patient effort)  -     Row Name 04/23/23 1247          Wheelchair Transfer    Type (Wheelchair Transfer) stand pivot/stand step  -     [place] Level (Wheelchair Transfer) maximum assist (25% patient effort)  -     Row Name 04/23/23 1247          Gait/Stairs (Locomotion)    Gait/Stairs Locomotion gait/ambulation independence;gait/ambulation assistive device;distance ambulated  -     [place] Level (Gait) maximum assist (25% patient effort)  -     Distance in Feet (Gait) 3  -DW     Row Name 04/23/23 1247          Safety Issues, Functional Mobility    Impairments Affecting Function (Mobility) balance;endurance/activity tolerance;strength  -DW     Row Name 04/23/23 1247          Balance    Balance Assessment standing dynamic balance  -     Dynamic Standing Balance maximum assist  -DW     Row Name 04/23/23 1247          Plan of Care Review    Plan of Care Reviewed With patient  -     Progress improving  -     Outcome Evaluation Pt presents with decreased functional mobility secondary to prolonged hospitalization.  Skilled PT intervention is needed to address noted deficits in order to restore functional mobility to PLOF  -DW     Row Name 04/23/23 1247          Therapy Assessment/Plan (PT)    PT Diagnosis (PT) Difficulty in walking  -     Rehab Potential (PT) good, to achieve stated therapy goals  -     Criteria for Skilled Interventions Met (PT) yes;meets criteria;skilled treatment is necessary  -     Therapy Frequency (PT) daily  -     Problem List (PT) problems related  to;balance;strength  -DW     Activity Limitations Related to Problem List (PT) unable to ambulate safely;unable to transfer safely;IADLs not performed adequately or safely  -     Row Name 04/23/23 1247          PT Evaluation Complexity    History, PT Evaluation Complexity 1-2 personal factors and/or comorbidities  -     Examination of Body Systems (PT Eval Complexity) total of 3 or more elements  -DW     Clinical Presentation (PT Evaluation Complexity) evolving  -DW     Clinical Decision Making (PT Evaluation Complexity) moderate complexity  -DW     Overall Complexity (PT Evaluation Complexity) moderate complexity  -     Row Name 04/23/23 1247          Therapy Plan Review/Discharge Plan (PT)    Therapy Plan Review (PT) evaluation/treatment results reviewed  -     Row Name 04/23/23 1247          Physical Therapy Goals    Bed Mobility Goal Selection (PT) bed mobility, PT goal 1  -DW     Transfer Goal Selection (PT) transfer, PT goal 1  -DW     Gait Training Goal Selection (PT) gait training, PT goal 1  -     Row Name 04/23/23 [facility]7          Bed Mobility Goal 1 (PT)    Activity/Assistive Device (Bed Mobility Goal 1, PT) bed mobility activities, all  -DW     [place] Level/Cues Needed (Bed Mobility Goal 1, PT) minimum assist (75% or more patient effort)  -DW     Time Frame (Bed Mobility Goal 1, PT) long term goal (LTG);10 days  -     Row Name 04/23/23 1247          Transfer Goal 1 (PT)    Activity/Assistive Device (Transfer Goal 1, PT) transfers, all  -DW     [place] Level/Cues Needed (Transfer Goal 1, PT) minimum assist (75% or more patient effort)  -DW     Time Frame (Transfer Goal 1, PT) long term goal (LTG);10 days  -     Row Name 04/23/23 1247          Gait Training Goal 1 (PT)    Activity/Assistive Device (Gait Training Goal 1, PT) gait (walking locomotion)  -DW     [place] Level (Gait Training Goal 1, PT) minimum assist (75% or more patient effort)  -DW     Distance (Gait Training Goal  1, PT) 150  -DW     Time Frame (Gait Training Goal 1, PT) long term goal (LTG);10 days  -DW           User Key  (r) = Recorded By, (t) = Taken By, (c) = Cosigned By    Initials Name Provider Type    DW Jose M Cutler, BRAD Physical Therapist                Physical Therapy Education     Title: PT OT SLP Therapies (Done)     Topic: Physical Therapy (Done)     Point: Mobility training (Done)     Learning Progress Summary           Patient Acceptance, E,TB, VU by DW at 4/23/2023 1256                   Point: Home exercise program (Done)     Learning Progress Summary           Patient Acceptance, E,TB, VU by DW at 4/23/2023 1256                   Point: Body mechanics (Done)     Learning Progress Summary           Patient Acceptance, E,TB, VU by DW at 4/23/2023 1256                   Point: Precautions (Done)     Learning Progress Summary           Patient Acceptance, E,TB, VU by DW at 4/23/2023 1256                               User Key     Initials Effective Dates Name Provider Type Discipline     04/25/21 -  Jose M Cutler, PT Physical Therapist PT              PT Recommendation and Plan  Anticipated Discharge Disposition (PT): skilled nursing facility, inpatient rehabilitation facility  Planned Therapy Interventions (PT): balance training, gait training, strengthening, transfer training  Therapy Frequency (PT): daily  Plan of Care Reviewed With: patient  Progress: improving  Outcome Evaluation: Pt presents with decreased functional mobility secondary to prolonged hospitalization.  Skilled PT intervention is needed to address noted deficits in order to restore functional mobility to PLOF   Outcome Measures     Row Name 04/23/23 125             How much help from another person do you currently need...    Turning from your back to your side while in flat bed without using bedrails? 2  -DW      Moving from lying on back to sitting on the side of a flat bed without bedrails? 2  -DW      Moving to and from a bed to a  chair (including a wheelchair)? 2  -DW      Standing up from a chair using your arms (e.g., wheelchair, bedside chair)? 2  -DW      Climbing 3-5 steps with a railing? 1  -DW      To walk in hospital room? 1  -DW      AM-PAC 6 Clicks Score (PT) 10  -DW         Functional Assessment    Outcome Measure Options AM-PAC 6 Clicks Basic Mobility (PT)  -DW            User Key  (r) = Recorded By, (t) = Taken By, (c) = Cosigned By    Initials Name Provider Type    Jose M Rojas, PT Physical Therapist                 Time Calculation:    PT Charges     Row Name 04/23/23 1257             Time Calculation    PT Received On 04/23/23  -DW      PT Goal Re-Cert Due Date 05/02/23  -DW         Timed Charges    57537 - PT Therapeutic Activity Minutes 15  -DW         Untimed Charges    PT Eval/Re-eval Minutes 8  -DW         Total Minutes    Timed Charges Total Minutes 15  -DW      Untimed Charges Total Minutes 8  -DW       Total Minutes 23  -DW            User Key  (r) = Recorded By, (t) = Taken By, (c) = Cosigned By    Initials Name Provider Type    Jose M Rojas, PT Physical Therapist              Therapy Charges for Today     Code Description Service Date Service Provider Modifiers Qty    51224752174 HC PT THERAPEUTIC ACT EA 15 MIN 4/23/2023 Jose M Cutler, PT GP 1    36398317684 HC PT EVAL MOD COMPLEXITY 2 4/23/2023 Jose M Cutler, PT GP 1          PT G-Codes  Outcome Measure Options: AM-PAC 6 Clicks Basic Mobility (PT)  AM-PAC 6 Clicks Score (PT): 10    Jose M Cutler PT  4/23/2023

## 2023-04-23 NOTE — PLAN OF CARE
Problem: Adult Inpatient Plan of Care  Goal: Plan of Care Review  Outcome: Ongoing, Progressing  Flowsheets (Taken 4/23/2023 2478)  Outcome Evaluation: Patient was a transfer from ICU this shift. Currently on 3L NC. No complaints of pain. Blood sugar monitored and insulin administered per MAR.   Goal Outcome Evaluation:              Outcome Evaluation: Patient was a transfer from ICU this shift. Currently on 3L NC. No complaints of pain. Blood sugar monitored and insulin administered per MAR.

## 2023-04-23 NOTE — PLAN OF CARE
Goal Outcome Evaluation:   Patient refused the bipap but tolerated being off the unit and on 2L NC

## 2023-04-23 NOTE — PROGRESS NOTES
[facility]   Hospitalist Progress Note  Date: 2023  Patient Name: Cristi Maloney  : 1940  MRN: [id number]  Date of admission: 2023      Subjective   Subjective       Chief complaint: Altered mental status, shortness of breath     Summary:  82-year-old male with history of diabetes, ex-smoker, with recent left shoulder surgery, was found down by his wife, short of air, tachycardic, tachypneic, found to be in DKA as well as A-fib with RVR, bilateral pneumonia, placed on BiPAP, hypotension, admitted to the ICU, critical care consulted, cardiology consulted, failed several rounds of cardioversion, concern for thyrotoxicosis, primary hypothyroidism, started on PTU with transition to methimazole.  Strep pneumo antigen positive, had to have increase in methimazole, adjustments in beta-blocker, calcium channel blocker to achieve adequate rate control, transferred out of ICU 2023     Interval follow-up: Patient seen and examined, no acute distress, no acute major overnight events, heart rate better controlled over the past 24 hours with adjustments made to medications, converted to sinus rhythm, short bursts of SVT on telemetry, on 2-1/2 L nasal cannula oxygen, did not wear BiPAP overnight.  No chest pain or palpitations.  Remains weak and debilitated.  Creatinine 0.52, hemoglobin 11.5, BUN 14, potassium 4.0.  Still has significant cough and shortness of breath symptoms with exertion.      Review of systems:  All systems reviewed and negative except for generalized fatigue, generalized weakness, cough, shortness of breath with exertion                Objective   Objective     Vitals:   Temp:  [97.9 °F (36.6 °C)-99.7 °F (37.6 °C)] 97.9 °F (36.6 °C)  Heart Rate:  [] 99  Resp:  [15-22] 22  BP: ()/(55-81) 136/55  Flow (L/min):  [2-3.5] 3.5  Physical Exam    Constitutional: Alert, awake, oriented, answering questions with nasal cannula oxygen              Eyes: Pupils equal, sclerae  anicteric              HENT: NCAT, mucous membranes moist              Neck: Supple, full range of motion              Respiratory: Air entry bilaterally, coarse breath sounds throughout, scattered rhonchi              Cardiovascular: Regular rate, regular rhythm              Gastrointestinal: Positive bowel sounds, soft, nontender, nondistended              Musculoskeletal: left shoulder incision clean, distal extremity NVI              Psychiatric: Pleasant mood and affect              Neurologic: Alert and awake and oriented, cranial nerves II through XII grossly intact to confrontation              Skin: No rashes    Result Review    Result Review:  I have personally reviewed the pertinent results from the past 24 hours to 4/23/2023 11:59 EDT and agree with these findings:  [x]  Laboratory   CBC        4/21/2023    03:56 4/22/2023    03:35 4/23/2023    02:36   CBC   WBC 14.29   13.02   10.41     RBC 2.91   3.37   3.67     Hemoglobin 9.3   10.8   11.5     Hematocrit 27.2   31.5   34.4     MCV 93.5   93.5   93.7     MCH 32.0   32.0   31.3     MCHC 34.2   34.3   33.4     RDW 14.7   14.5   14.3     Platelets 183   203   239       BMP        4/21/2023    03:56 4/21/2023    10:43 4/21/2023    12:29 4/21/2023    15:56 4/21/2023    20:22   BMP   BUN 15   13   14   14   15     Creatinine 0.53   0.55   0.50   0.55   0.51     Sodium 139   137   132   135   134     Potassium 4.0   3.9   4.2   4.6   4.3     Chloride 111   109   105   105   103     CO2 18.0   19.4   17.7   16.6   18.1     Calcium 8.3   8.5   8.4   8.7   8.7             4/22/2023    03:35 4/23/2023    02:36   BMP   BUN 15   14     Creatinine 0.53   0.52     Sodium 137   136     Potassium 4.4   4.0     Chloride 103   100     CO2 20.6   24.2     Calcium 9.2   9.3       LIVER FUNCTION TESTS:      Lab 04/23/23  0236 04/22/23  0335 04/20/23  2349 04/19/23  2331 04/19/23  0630   TOTAL PROTEIN 6.1 5.6* 4.8* 5.0* 6.9   ALBUMIN 2.7* 2.5* 2.1* 2.4* 3.3*   GLOBULIN  --    --   --  2.6 3.6   ALT (SGPT) 23 22 14 10 11   AST (SGOT) 25 31 25 19 10   BILIRUBIN 0.8 0.9 0.8 0.7 1.3*   INDIRECT BILIRUBIN 0.6 0.6 0.5  --   --    BILIRUBIN DIRECT 0.2 0.3 0.3  --   --    ALK PHOS 76 67 52 46 68       [x]  Microbiology   Blood Culture   Date Value Ref Range Status   04/19/2023 No growth at 2 days  Preliminary   04/19/2023 No growth at 2 days  Preliminary     No results found for: BCIDPCR, CXREFLEX, CSFCX, CULTURETIS  No results found for: CULTURES, HSVCX, URCX  No results found for: EYECULTURE, GCCX, HSVCULTURE, LABHSV  No results found for: LEGIONELLA, MRSACX, MUMPSCX, MYCOPLASCX  No results found for: NOCARDIACX, STOOLCX  No results found for: THROATCX, UNSTIMCULT, URINECX, CULTURE, VZVCULTUR  No results found for: VIRALCULTU, WOUNDCX    [x]  Radiology Adult Transthoracic Echo Complete W/ Cont if Necessary Per Protocol    Result Date: 4/20/2023  •  Technically difficult study. •  Left ventricular ejection fraction appears to be 51 - 55%.  No obvious regional wall motion abnormalities. •  Left ventricular diastolic function is consistent with (grade I) impaired relaxation and age. •  Mild aortic valve stenosis is present with max/mean pressure gradient 20/13 mmHg..     CT Chest With Contrast Diagnostic    Result Date: 4/19/2023  PROCEDURE: CT CHEST W CONTRAST DIAGNOSTIC  COMPARISON:  None INDICATIONS: shortness of breath  TECHNIQUE: After obtaining the patient's consent, CT images were obtained with non-ionic intravenous contrast material.   PROTOCOL:   Pulmonary embolism imaging protocol performed    RADIATION:   DLP: 525.7mGy*cm   Automated exposure control was utilized to minimize radiation dose. CONTRAST: 100cc Isovue 370 I.V.  FINDINGS:  There is advanced emphysematous lung disease.  There is airspace disease dependently in both lower lobes.  No nodules or masses are identified.  There is atherosclerotic disease in the aorta and coronary arteries.  There is no mediastinal lymphadenopathy.   There are enlarged left hilar nodes measuring 2.3 by 1.3 cm.  There are borderline enlarged right hilar nodes.  Densely calcified nodes are present in the left hilum.  No acute findings are present in the upper abdomen.  Degenerative changes are present in the dorsal spine without definite bone destruction.  There are postoperative changes of left shoulder arthroplasty.  The aorta is of normal caliber.  There is significant motion degradation in the lung bases.  There are no obvious filling defects within the main or proximal pulmonary arteries.        1. Significant motion artifact is present in the bases.  No central pulmonary emboli are identified.  2. Extensive consolidation in both lower lobes raising the question of aspiration. 3. Advanced emphysematous lung disease 4. Extensive coronary artery atherosclerotic calcifications. 5. Left hilar lymphadenopathy with a 2.3 cm node.  There are densely calcified nodes in the left hilum as well.  Smaller nodes are present within the right hilum.  There is no mediastinal lymphadenopathy. 6. Postop changes of recent left shoulder arthroplasty.     Jordan Marrero MD       Electronically Signed and Approved By: Jordan Marrero MD on 4/19/2023 at 7:46             XR Chest 1 View    Result Date: 4/19/2023  PROCEDURE: XR CHEST 1 VW  COMPARISON: [facility], CR, CHEST AP/PA 1 VIEW, 12/03/2013, 16:00.  INDICATIONS: Dysrhythmia  FINDINGS:  Infiltrates are noted in the lung bases.  The cardiac silhouette and mediastinum are stable.  Aortic atherosclerosis is noted.  No acute osseous abnormalities are observed.        1. Bibasilar infiltrates are noted.  The findings may relate to bibasilar pneumonia.  Changes of CHF and pulmonary edema could also have this appearance.       ANAYA FELDMAN MD       Electronically Signed and Approved By: ANAYA FELDMAN MD on 4/19/2023 at 6:46             XR Shoulder 1 Vw LT    Result Date: 4/10/2023  REVIEWING YOUR TEST RESULTS IN  [facility] IS NOT A SUBSTITUTE FOR DISCUSSING THOSE RESULTS WITH YOUR HEALTH CARE PROVIDER. PLEASE CONTACT YOUR PROVIDER VIA [employer] [facility] TO DISCUSS ANY QUESTIONS OR CONCERNS YOU MAY HAVE REGARDING THESE TEST RESULTS.  RADIOLOGY REPORT FACILITY:  [facility] UNIT/AGE/GENDER: JEAN-PIERREPERIOP  OP      AGE:82 Y          SEX:M PATIENT NAME/:  DAPHNE BUSBY E    1940 UNIT NUMBER:  [id number] ACCOUNT NUMBER:  [id number] ACCESSION NUMBER:  [id number] PORTABLE AP LEFT SHOULDER 1328 hours DATE: 04/10/2023 COMPARISON: 2023 INDICATION: Status post shoulder joint replacement. IMPRESSION: Patient is status post reverse total left shoulder arthroplasty. Prosthesis is in expected position and alignment in this single view. Negative for periprosthetic fracture. Surgical drain in place. Dictated by: Marlo Winters M.D. Images and Report reviewed and interpreted by: Marlo Winters M.D. <PS><Electronically signed by: Marlo Winters M.D.> 04/10/2023 1349 D: 04/10/2023 1348 T: 04/10/2023 1348      [x]  EKG/Telemetry   []  Cardiology/Vascular   []  Pathology  [x]  Old records  []  Other:    Assessment & Plan   Assessment / Plan     Assessment/Plan:    Assessment:  Atrial fibrillation rapid ventricular response  Multifocal atrial tachycardia  Multifocal pneumonia with concern for pneumococcal infection  Acute hypoxemic respiratory failure  Diabetic ketoacidosis  JUNIOR  Anion gap metabolic acidosis  Thyrotoxicosis/hyperthyroidism  Hypophosphatemia  Hypokalemia  Acute metabolic encephalopathy multifactorial likely related to sepsis  COPD  Dyslipidemia  Chronic diastolic CHF  Aortic valve stenosis     Plan:  Labs and imaging reviewed  Transfer out of ICU to monitored floor  Out of bed to chair  Start PT/OT  After 2 doses of IV metoprolol 5 mg, the patient converted to sinus rhythm, will closely monitor  Increase methimazole to 10 mg 3 times daily  Increase mid Toprol XL to 50 mg twice a day continue  ICU level monitoring  Continue to wean oxygen per tolerance  Continue Levemir to 15 units twice daily  Continue insulin sliding scale coverage  Continue Plavix 75 mg daily  Cardiology recommendations appreciated  Discussed with critical care Dr. Meier, recommendations appreciated  Continue ceftriaxone 1 g daily to complete 7 days total  We will need TSH and free T4 reevaluated in the next few days; repeat in 2 to 3 days  Tamsulosin 0.4 mg daily continued  Continue telemetry monitoring  A.m. labs  Full code  DVT prophylaxis with Lovenox  Clinical course dictate further management of this critically ill patient in the intensive care unit  Discussed with nurse at the bedside  DVT prophylaxis:  Medical and mechanical DVT prophylaxis orders are present.    CODE STATUS:   Level Of Support Discussed With: Patient  Code Status (Patient has no pulse and is not breathing): CPR (Attempt to Resuscitate)  Medical Interventions (Patient has pulse or is breathing): Full Support  Release to patient: Routine Release    Electronically signed by Daphne Washington MD, 04/23/23, 12:13 PM EDT.    Portions of this documentation were transcribed electronically from a voice recognition software.  I confirm all data accurately represents the service(s) I performed at today's visit.

## 2023-04-23 NOTE — PLAN OF CARE
Goal Outcome Evaluation:      Pt did not wear bipap last night. Pt is on 2.5lnc. pt sats are remaining >90

## 2023-04-23 NOTE — PLAN OF CARE
Goal Outcome Evaluation:  Plan of Care Reviewed With: patient        Progress: improving  Outcome Evaluation: Pt presents with decreased functional mobility secondary to prolonged hospitalization.  Skilled PT intervention is needed to address noted deficits in order to restore functional mobility to PLOF

## 2023-04-23 NOTE — PROGRESS NOTES
Reason for consultation critical care management    Patient critically ill in the intensive care unit  In A-fib/flutter rate controlled.  Not required IV push Cardizem or metoprolol since yesterday morning  In bed with head of bed elevated, interacting with staff  Family member at bedside  PT to work with patient today  Continues on 3 L nasal cannula      Vitals:    04/22/23 0900 04/22/23 0956 04/22/23 1000 04/22/23 1100   BP: 147/68 135/78 (!) 142/104 117/61   Pulse: 113 (!) 202 (!) 165 (!) 158   Resp:       Temp:       TempSrc:       SpO2: 93%  92% 94%   Weight:       Height:           Physical Exam  Acutely ill male on nasal cannula  Mental status much improved  A-fib, currently rate controlled  Abdomen is soft      Brief Patient Summary:  Cristi Maloney is a 82 y.o. male who critically ill in the intensive care unit with respiratory failure due to multifocal pneumonia, sepsis and rapid A-fib with DKA     Active Hospital Problems:       Active Hospital Problems     Diagnosis     • **Sepsis     Rapid atrial fibrillation with RVR  Multifocal pneumonia concern for pneumococcal pneumonia  Acute hypoxic respiratory failure  Diabetic ketoacidosis  Acute kidney injury likely prerenal  Anion gap metabolic acidosis  Primary hyperhyroidism/thyrotoxicosis  Hypophosphatemia  Hypokalemia  Altered mental status likely metabolic encephalopathy from sepsis  COPD with centrilobular emphysema    Plan:   Continue p.o. metoprolol and diltiazem; did not require IV push Cardizem or metoprolol since yesterday morning  Continue methimazole.  Will repeat T4 in a.m.  Continue on Levemir and sliding scale insulin  Speech therapy on board.  They continue to work with patient in regards to swallow.  Currently on modified diet  Continue antibiotic coverage with Rocephin to complete 7-day treatment.  Completed azithromycin dosing  Continue scheduled Xopenex  Trend renal function  PT ordered.  Encourage out of bed to chair daily  Bowel regimen  in place  DVT prophylaxis with Lovenox    Labs, radiology, microbiology and provider notes were personally reviewed  Patient's case was discussed with the primary service as well as the bedside RN    Electronically signed by JARROD Delaney, 04/23/23, 12:43 PM EDT.    This visit was performed by BOTH a physician and an APC.  I spent greater than 51% of time caring for this patient I personally evaluated and examined the patient. I performed all aspects of MDM as documented. , I have reviewed and confirmed the accuracy of the patient's history as documented in this note. and I have reexamined the patient and the results are consistent with the previously documented exam. I have updated the documentation as necessary    Electronically signed by Maurice Meier DO, 04/23/23, 5:49 PM EDT.

## 2023-04-23 NOTE — PROGRESS NOTES
[facility]     Cardiology Progress Note    Patient Name: Cristi Maloney  : 1940  MRN: [id number]  Primary Care Physician:  Gonzalo Nugent MD  Date of admission: 2023    Subjective   Subjective     Chief Complaint: Follow-up visit for atrial fibrillation/tachycardia    Interval HPI:    Patient reports feeling weak and tired and continues to cough.  Shortness of breath improved.  No further episodes of tachycardia during the past 18 hours.  Blood pressure stable.  He is being transferred out of ICU today.    Review of Systems   All systems were reviewed and negative except for: Cough, weakness, shortness of breath and palpitations.  Negative for chest pain    Objective   Objective     Vitals:   Temp:  [97.9 °F (36.6 °C)-99.7 °F (37.6 °C)] 97.9 °F (36.6 °C)  Heart Rate:  [] 99  Resp:  [15-22] 22  BP: ()/(55-81) 136/55  Flow (L/min):  [2-3.5] 3  Physical Exam      General : Alert, awake, no acute distress, surgical scar on left shoulder  CVS : Regular rate and rhythm, no murmur, rubs or gallops  Lungs: Clear to auscultation bilaterally, no crackles or rhonchi  Abdomen: Soft, nontender, bowel sounds heard in all 4 quadrants  Extremities: Warm, well-perfused, no pedal edema    Scheduled Meds:cefTRIAXone, 1 g, Intravenous, Q24H  clopidogrel, 75 mg, Oral, Daily  enoxaparin, 40 mg, Subcutaneous, Q24H  insulin detemir, 15 Units, Subcutaneous, Q12H  insulin lispro, 2-9 Units, Subcutaneous, TID With Meals  levalbuterol, 1.25 mg, Nebulization, Q6H - RT  methIMAzole, 10 mg, Oral, Q8H  metoprolol succinate XL, 50 mg, Oral, Q12H  montelukast, 10 mg, Oral, Nightly  senna-docusate sodium, 2 tablet, Oral, BID  sodium chloride, 10 mL, Intravenous, Q12H  sodium chloride, 10 mL, Intravenous, Q12H  sodium chloride, 4 mL, Nebulization, BID - RT  tamsulosin, 0.4 mg, Oral, Daily      Continuous Infusions:Pharmacy to Dose enoxaparin (LOVENOX),            Result Review    Result Review:  I have personally  reviewed the results from the time of this admission to 4/23/2023 12:51 EDT and agree with these findings:  [x]  Laboratory  []  Microbiology  [x]  Radiology  [x]  EKG/Telemetry   [x]  Cardiology/Vascular   []  Pathology  []  Old records  []  Other:  Most notable findings include:     CBC        4/21/2023    03:56 4/22/2023    03:35 4/23/2023    02:36   CBC   WBC 14.29   13.02   10.41     RBC 2.91   3.37   3.67     Hemoglobin 9.3   10.8   11.5     Hematocrit 27.2   31.5   34.4     MCV 93.5   93.5   93.7     MCH 32.0   32.0   31.3     MCHC 34.2   34.3   33.4     RDW 14.7   14.5   14.3     Platelets 183   203   239       CMP        4/21/2023    03:56 4/21/2023    10:43 4/21/2023    12:29 4/21/2023    15:56 4/21/2023    20:22   CMP   Glucose 145   119   122   169   166     BUN 15   13   14   14   15     Creatinine 0.53   0.55   0.50   0.55   0.51     EGFR 100.7   99.6   102.5   99.6   101.9     Sodium 139   137   132   135   134     Potassium 4.0   3.9   4.2   4.6   4.3     Chloride 111   109   105   105   103     Calcium 8.3   8.5   8.4   8.7   8.7     Total Protein        Albumin        Total Bilirubin        Alkaline Phosphatase        AST (SGOT)        ALT (SGPT)        BUN/Creatinine Ratio 28.3   23.6   28.0   25.5   29.4     Anion Gap 10.0   8.6   9.3   13.4   12.9             4/22/2023    03:35 4/23/2023    02:36   CMP   Glucose 145   187     BUN 15   14     Creatinine 0.53   0.52     EGFR 100.7   101.3     Sodium 137   136     Potassium 4.4   4.0     Chloride 103   100     Calcium 9.2   9.3     Total Protein 5.6   6.1     Albumin 2.5   2.7     Total Bilirubin 0.9   0.8     Alkaline Phosphatase 67   76     AST (SGOT) 31   25     ALT (SGPT) 22   23     BUN/Creatinine Ratio 28.3   26.9     Anion Gap 13.4   11.8        CARDIAC LABS:      Lab 04/19/23  1300 04/19/23  1114 04/19/23  0630   PROBNP  --   --  2,283.0*   HSTROP T 91* 89* 99*   PROTIME  --   --  15.1*   INR  --   --  1.19*      Results for orders placed  during the hospital encounter of 04/19/23    Adult Transthoracic Echo Complete W/ Cont if Necessary Per Protocol    Interpretation Summary  •  Technically difficult study.  •  Left ventricular ejection fraction appears to be 51 - 55%.  No obvious regional wall motion abnormalities.  •  Left ventricular diastolic function is consistent with (grade I) impaired relaxation and age.  •  Mild aortic valve stenosis is present with max/mean pressure gradient 20/13 mmHg..      Assessment & Plan   Assessment / Plan     Brief Patient Summary:  Cristi Maloney is a 82 y.o. male with diabetes, coronary artery disease, previous angioplasty, recent shoulder surgery on 4/10/2022, who was found down at home on 4/19/2022.  Tachycardia per EMS with failed cardioversion.  Subsequent diagnosed DKA, bilateral pneumonia and hypothyroidism.  Rhythm eventually converted normal sinus rhythm but continues to have intermittent episodes.    Active Hospital Problems:  Active Hospital Problems    Diagnosis    • **Sepsis      Multifocal pneumonia  Diabetes mellitus with ketoacidosis on admission    Paroxysmal atrial fibrillation with RVR : Episodes likely triggered by hyperthyroid state  Intermittent multifocal atrial tachycardia  Thyrotoxicosis : Recently high levels of T4  Hypoxic respiratory failure  Coronary artery disease : Devious multivessel angioplasty, stable with no angina    Plan:     Will increase the dose of Toprol-XL to 50 mg twice daily due to intermittent episodes of tachycardia and hyperthyroid state.  Discontinue oral Cardizem    Continue methimazole per primary team  Antibiotics per hospitalist and intensivist    Discussed with Dr. Meier    CODE STATUS:   Level Of Support Discussed With: Patient  Code Status (Patient has no pulse and is not breathing): CPR (Attempt to Resuscitate)  Medical Interventions (Patient has pulse or is breathing): Full Support  Release to patient: Routine Release      Electronically signed by Deniz PEREZ  MD Terrie, 04/22/23, 4:21 PM EDT.

## 2023-04-24 ENCOUNTER — APPOINTMENT (OUTPATIENT)
Dept: GENERAL RADIOLOGY | Facility: HOSPITAL | Age: 83
End: 2023-04-24
Payer: MEDICARE

## 2023-04-24 LAB
ALBUMIN SERPL-MCNC: 2.7 G/DL (ref 3.5–5.2)
ALP SERPL-CCNC: 78 U/L (ref 39–117)
ALT SERPL W P-5'-P-CCNC: 21 U/L (ref 1–41)
ANION GAP SERPL CALCULATED.3IONS-SCNC: 8.9 MMOL/L (ref 5–15)
AST SERPL-CCNC: 19 U/L (ref 1–40)
BACTERIA SPEC AEROBE CULT: NORMAL
BACTERIA SPEC AEROBE CULT: NORMAL
BASOPHILS # BLD AUTO: 0.14 10*3/MM3 (ref 0–0.2)
BASOPHILS NFR BLD AUTO: 1.2 % (ref 0–1.5)
BILIRUB CONJ SERPL-MCNC: 0.2 MG/DL (ref 0–0.3)
BILIRUB INDIRECT SERPL-MCNC: 0.4 MG/DL
BILIRUB SERPL-MCNC: 0.6 MG/DL (ref 0–1.2)
BUN SERPL-MCNC: 13 MG/DL (ref 8–23)
BUN/CREAT SERPL: 23.2 (ref 7–25)
CALCIUM SPEC-SCNC: 9.2 MG/DL (ref 8.6–10.5)
CHLORIDE SERPL-SCNC: 99 MMOL/L (ref 98–107)
CO2 SERPL-SCNC: 27.1 MMOL/L (ref 22–29)
CREAT SERPL-MCNC: 0.56 MG/DL (ref 0.76–1.27)
DEPRECATED RDW RBC AUTO: 47.8 FL (ref 37–54)
EGFRCR SERPLBLD CKD-EPI 2021: 99 ML/MIN/1.73
EOSINOPHIL # BLD AUTO: 0.1 10*3/MM3 (ref 0–0.4)
EOSINOPHIL NFR BLD AUTO: 0.8 % (ref 0.3–6.2)
ERYTHROCYTE [DISTWIDTH] IN BLOOD BY AUTOMATED COUNT: 13.8 % (ref 12.3–15.4)
GLUCOSE BLDC GLUCOMTR-MCNC: 234 MG/DL (ref 70–99)
GLUCOSE BLDC GLUCOMTR-MCNC: 245 MG/DL (ref 70–99)
GLUCOSE BLDC GLUCOMTR-MCNC: 248 MG/DL (ref 70–99)
GLUCOSE BLDC GLUCOMTR-MCNC: 304 MG/DL (ref 70–99)
GLUCOSE SERPL-MCNC: 255 MG/DL (ref 65–99)
HCT VFR BLD AUTO: 33.9 % (ref 37.5–51)
HGB BLD-MCNC: 11.3 G/DL (ref 13–17.7)
IMM GRANULOCYTES # BLD AUTO: 0.63 10*3/MM3 (ref 0–0.05)
IMM GRANULOCYTES NFR BLD AUTO: 5.2 % (ref 0–0.5)
LYMPHOCYTES # BLD AUTO: 1.86 10*3/MM3 (ref 0.7–3.1)
LYMPHOCYTES NFR BLD AUTO: 15.4 % (ref 19.6–45.3)
MAGNESIUM SERPL-MCNC: 1.7 MG/DL (ref 1.6–2.4)
MCH RBC QN AUTO: 31.6 PG (ref 26.6–33)
MCHC RBC AUTO-ENTMCNC: 33.3 G/DL (ref 31.5–35.7)
MCV RBC AUTO: 94.7 FL (ref 79–97)
MONOCYTES # BLD AUTO: 1 10*3/MM3 (ref 0.1–0.9)
MONOCYTES NFR BLD AUTO: 8.3 % (ref 5–12)
NEUTROPHILS NFR BLD AUTO: 69.1 % (ref 42.7–76)
NEUTROPHILS NFR BLD AUTO: 8.34 10*3/MM3 (ref 1.7–7)
NRBC BLD AUTO-RTO: 0.2 /100 WBC (ref 0–0.2)
PHOSPHATE SERPL-MCNC: 3.6 MG/DL (ref 2.5–4.5)
PLATELET # BLD AUTO: 256 10*3/MM3 (ref 140–450)
PMV BLD AUTO: 10.5 FL (ref 6–12)
POTASSIUM SERPL-SCNC: 3.7 MMOL/L (ref 3.5–5.2)
PROCALCITONIN SERPL-MCNC: 1.88 NG/ML (ref 0–0.25)
PROT SERPL-MCNC: 5.9 G/DL (ref 6–8.5)
RBC # BLD AUTO: 3.58 10*6/MM3 (ref 4.14–5.8)
SODIUM SERPL-SCNC: 135 MMOL/L (ref 136–145)
WBC NRBC COR # BLD: 12.07 10*3/MM3 (ref 3.4–10.8)

## 2023-04-24 PROCEDURE — 94799 UNLISTED PULMONARY SVC/PX: CPT

## 2023-04-24 PROCEDURE — 80048 BASIC METABOLIC PNL TOTAL CA: CPT | Performed by: FAMILY MEDICINE

## 2023-04-24 PROCEDURE — 97110 THERAPEUTIC EXERCISES: CPT

## 2023-04-24 PROCEDURE — 73610 X-RAY EXAM OF ANKLE: CPT

## 2023-04-24 PROCEDURE — 85025 COMPLETE CBC W/AUTO DIFF WBC: CPT | Performed by: FAMILY MEDICINE

## 2023-04-24 PROCEDURE — 94664 DEMO&/EVAL PT USE INHALER: CPT

## 2023-04-24 PROCEDURE — 84100 ASSAY OF PHOSPHORUS: CPT | Performed by: FAMILY MEDICINE

## 2023-04-24 PROCEDURE — 84145 PROCALCITONIN (PCT): CPT | Performed by: NURSE PRACTITIONER

## 2023-04-24 PROCEDURE — 25010000002 ENOXAPARIN PER 10 MG: Performed by: INTERNAL MEDICINE

## 2023-04-24 PROCEDURE — 82962 GLUCOSE BLOOD TEST: CPT

## 2023-04-24 PROCEDURE — 80076 HEPATIC FUNCTION PANEL: CPT | Performed by: FAMILY MEDICINE

## 2023-04-24 PROCEDURE — 99233 SBSQ HOSP IP/OBS HIGH 50: CPT | Performed by: FAMILY MEDICINE

## 2023-04-24 PROCEDURE — 99233 SBSQ HOSP IP/OBS HIGH 50: CPT | Performed by: STUDENT IN AN ORGANIZED HEALTH CARE EDUCATION/TRAINING PROGRAM

## 2023-04-24 PROCEDURE — 63710000001 INSULIN DETEMIR PER 5 UNITS: Performed by: FAMILY MEDICINE

## 2023-04-24 PROCEDURE — 63710000001 INSULIN LISPRO (HUMAN) PER 5 UNITS: Performed by: INTERNAL MEDICINE

## 2023-04-24 PROCEDURE — 97166 OT EVAL MOD COMPLEX 45 MIN: CPT

## 2023-04-24 PROCEDURE — 83735 ASSAY OF MAGNESIUM: CPT | Performed by: FAMILY MEDICINE

## 2023-04-24 PROCEDURE — 99233 SBSQ HOSP IP/OBS HIGH 50: CPT | Performed by: INTERNAL MEDICINE

## 2023-04-24 PROCEDURE — 94760 N-INVAS EAR/PLS OXIMETRY 1: CPT

## 2023-04-24 PROCEDURE — 25010000002 CEFTRIAXONE PER 250 MG: Performed by: INTERNAL MEDICINE

## 2023-04-24 RX ORDER — SODIUM CHLORIDE FOR INHALATION 3 %
4 VIAL, NEBULIZER (ML) INHALATION
Status: DISCONTINUED | OUTPATIENT
Start: 2023-04-24 | End: 2023-04-26 | Stop reason: HOSPADM

## 2023-04-24 RX ORDER — ARFORMOTEROL TARTRATE 15 UG/2ML
15 SOLUTION RESPIRATORY (INHALATION)
Status: DISCONTINUED | OUTPATIENT
Start: 2023-04-24 | End: 2023-04-26 | Stop reason: HOSPADM

## 2023-04-24 RX ORDER — SODIUM CHLORIDE FOR INHALATION 3 %
4 VIAL, NEBULIZER (ML) INHALATION
Status: DISCONTINUED | OUTPATIENT
Start: 2023-04-24 | End: 2023-04-24

## 2023-04-24 RX ORDER — BUDESONIDE 0.5 MG/2ML
0.5 INHALANT ORAL
Status: DISCONTINUED | OUTPATIENT
Start: 2023-04-24 | End: 2023-04-26 | Stop reason: HOSPADM

## 2023-04-24 RX ADMIN — DILTIAZEM HYDROCHLORIDE 30 MG: 30 TABLET, FILM COATED ORAL at 12:28

## 2023-04-24 RX ADMIN — CEFTRIAXONE SODIUM 1 G: 1 INJECTION, SOLUTION INTRAVENOUS at 11:46

## 2023-04-24 RX ADMIN — INSULIN LISPRO 4 UNITS: 100 INJECTION, SOLUTION INTRAVENOUS; SUBCUTANEOUS at 08:32

## 2023-04-24 RX ADMIN — ARFORMOTEROL TARTRATE 15 MCG: 15 SOLUTION RESPIRATORY (INHALATION) at 19:05

## 2023-04-24 RX ADMIN — METHIMAZOLE 10 MG: 5 TABLET ORAL at 05:30

## 2023-04-24 RX ADMIN — METHIMAZOLE 10 MG: 5 TABLET ORAL at 15:36

## 2023-04-24 RX ADMIN — INSULIN DETEMIR 20 UNITS: 100 INJECTION, SOLUTION SUBCUTANEOUS at 20:52

## 2023-04-24 RX ADMIN — LEVALBUTEROL HYDROCHLORIDE 1.25 MG: 1.25 SOLUTION RESPIRATORY (INHALATION) at 00:22

## 2023-04-24 RX ADMIN — LEVALBUTEROL HYDROCHLORIDE 1.25 MG: 1.25 SOLUTION RESPIRATORY (INHALATION) at 08:37

## 2023-04-24 RX ADMIN — ENOXAPARIN SODIUM 40 MG: 100 INJECTION SUBCUTANEOUS at 11:46

## 2023-04-24 RX ADMIN — Medication 10 ML: at 20:53

## 2023-04-24 RX ADMIN — DILTIAZEM HYDROCHLORIDE 30 MG: 30 TABLET, FILM COATED ORAL at 18:27

## 2023-04-24 RX ADMIN — MONTELUKAST 10 MG: 10 TABLET, FILM COATED ORAL at 20:52

## 2023-04-24 RX ADMIN — CLOPIDOGREL BISULFATE 75 MG: 75 TABLET ORAL at 08:31

## 2023-04-24 RX ADMIN — TAMSULOSIN HYDROCHLORIDE 0.4 MG: 0.4 CAPSULE ORAL at 08:39

## 2023-04-24 RX ADMIN — SENNOSIDES AND DOCUSATE SODIUM 2 TABLET: 8.6; 5 TABLET ORAL at 08:31

## 2023-04-24 RX ADMIN — Medication 10 ML: at 08:37

## 2023-04-24 RX ADMIN — INSULIN DETEMIR 15 UNITS: 100 INJECTION, SOLUTION SUBCUTANEOUS at 08:36

## 2023-04-24 RX ADMIN — INSULIN LISPRO 4 UNITS: 100 INJECTION, SOLUTION INTRAVENOUS; SUBCUTANEOUS at 12:29

## 2023-04-24 RX ADMIN — METOPROLOL SUCCINATE 50 MG: 50 TABLET, EXTENDED RELEASE ORAL at 20:52

## 2023-04-24 RX ADMIN — Medication 10 ML: at 08:36

## 2023-04-24 RX ADMIN — INSULIN LISPRO 4 UNITS: 100 INJECTION, SOLUTION INTRAVENOUS; SUBCUTANEOUS at 18:26

## 2023-04-24 RX ADMIN — LEVALBUTEROL HYDROCHLORIDE 1.25 MG: 1.25 SOLUTION RESPIRATORY (INHALATION) at 19:05

## 2023-04-24 RX ADMIN — DILTIAZEM HYDROCHLORIDE 30 MG: 30 TABLET, FILM COATED ORAL at 08:37

## 2023-04-24 RX ADMIN — METHIMAZOLE 10 MG: 5 TABLET ORAL at 20:52

## 2023-04-24 RX ADMIN — SENNOSIDES AND DOCUSATE SODIUM 2 TABLET: 8.6; 5 TABLET ORAL at 20:52

## 2023-04-24 RX ADMIN — METOPROLOL SUCCINATE 50 MG: 50 TABLET, EXTENDED RELEASE ORAL at 08:38

## 2023-04-24 RX ADMIN — LEVALBUTEROL HYDROCHLORIDE 1.25 MG: 1.25 SOLUTION RESPIRATORY (INHALATION) at 12:34

## 2023-04-24 RX ADMIN — BUDESONIDE 0.5 MG: 0.5 SUSPENSION RESPIRATORY (INHALATION) at 19:05

## 2023-04-24 RX ADMIN — Medication 4 ML: at 08:41

## 2023-04-24 NOTE — PROGRESS NOTES
Respiratory Therapist Broncho-Pulmonary Hygiene Progress Note      Patient Name:  Cristi Maloney  YOB: 1940    Cristi Maloney meets the qualification for Level 2 of the Bronco-Pulmonary Hygiene Protocol. This was based on my daily patient assessment and includes review of chest x-ray results, cough ability and quality, oxygenation, secretions or risk for secretion development and patient mobility.     Broncho-Pulmonary Hygiene Assessment:    Level of Movement: Actively changing positions without assistance  Alert/ oriented/ cooperative    Breath Sounds: Diminished and/or coarse rhonchi    Cough: Strong, effective and/or frequent    Chest X-Ray: Presence of atelectasis and/or consolidation    Sputum Productions: Able to produce small to moderate amount, of moderately thick secretions    History and Physical: New onset of bronchitis or existing chronic pulmonary conditions.  **(not in an exacerbation)    SpO2 to Oxygen Need: Had to increase at this time    Current SpO2 is: 90 on 5 L nasal cannula    Based on this information, I have completed the following interventions: Aerobika with bronchodialtor medication or TID      Electronically signed by Elaine Farris RRT, 04/24/23, 8:48 AM EDT.

## 2023-04-24 NOTE — PLAN OF CARE
Goal Outcome Evaluation:            No complaint of pain or discomfort. No s/s of acute distress noted.

## 2023-04-24 NOTE — PROGRESS NOTES
[facility]   Hospitalist Progress Note  Date: 2023  Patient Name: Cristi Maloney  : 1940  MRN: [id number]  Date of admission: 2023      Subjective   Subjective     Chief complaint: Altered mental status, shortness of breath     Summary:  82-year-old male with history of diabetes, ex-smoker, with recent left shoulder surgery, was found down by his wife, short of air, tachycardic, tachypneic, found to be in DKA as well as A-fib with RVR, bilateral pneumonia, placed on BiPAP, hypotension, admitted to the ICU, critical care consulted, cardiology consulted, failed several rounds of cardioversion, concern for thyrotoxicosis, primary hypothyroidism, started on PTU with transition to methimazole.  Strep pneumo antigen positive, had to have increase in methimazole, adjustments in beta-blocker, calcium channel blocker to achieve adequate rate control, transferred out of ICU 2023, right ankle pain, x-ray ordered for evaluation     Interval follow-up: Patient seen and examined this morning, no acute distress, no acute major night events, patient continues to have improvements in his breathing, his right ankle is hurting, tender and swollen.  He has not had a history of gout checking uric acid with a.m. labs.  His procalcitonin has trended down significantly from 10-1, his white blood cell count still around 12,000.  He denied any chest pain or palpitations.  His blood sugars are in the 200 range consistently, his Levemir dose is being increased.  Telemetry reviewed, heart rate remains elevated 100s to 120s, multifocal atrial tachycardia.  Resumed Cardizem in addition to metoprolol.  Remains very weak and fatigued    Review of systems:  All systems reviewed and negative except for generalized fatigue, generalized weakness, cough, shortness of breath with exertion, right ankle pain                Objective   Objective     Vitals:   Temp:  [97.3 °F (36.3 °C)-98.2 °F (36.8 °C)] 98.1 °F (36.7 °C)  Heart  Rate:  [] 90  Resp:  [18-20] 20  BP: (120-143)/(54-75) 126/54  Flow (L/min):  [1-5] 5  Physical Exam    Constitutional: Alert, awake, oriented, answering questions with nasal cannula oxygen              Eyes: Pupils equal, sclerae anicteric              HENT: NCAT, mucous membranes moist              Neck: Supple, full range of motion              Respiratory: Air entry bilaterally, coarse breath sounds throughout, scattered rhonchi              Cardiovascular: Regular rate, regular rhythm              Gastrointestinal: Positive bowel sounds, soft, nontender, nondistended              Musculoskeletal: left shoulder incision clean, distal extremity NVI, tenderness right ankle, mild edema surrounding joint medially and laterally              Psychiatric: Pleasant mood and affect              Neurologic: Alert and awake and oriented, cranial nerves II through XII grossly intact to confrontation              Skin: No rashes  Result Review    Result Review:  I have personally reviewed the pertinent results from the past 24 hours to 4/24/2023 15:59 EDT and agree with these findings:  [x]  Laboratory   CBC        4/22/2023    03:35 4/23/2023    02:36 4/24/2023    04:40   CBC   WBC 13.02   10.41   12.07     RBC 3.37   3.67   3.58     Hemoglobin 10.8   11.5   11.3     Hematocrit 31.5   34.4   33.9     MCV 93.5   93.7   94.7     MCH 32.0   31.3   31.6     MCHC 34.3   33.4   33.3     RDW 14.5   14.3   13.8     Platelets 203   239   256       BMP        4/22/2023    03:35 4/23/2023    02:36 4/24/2023    04:40   BMP   BUN 15   14   13     Creatinine 0.53   0.52   0.56     Sodium 137   136   135     Potassium 4.4   4.0   3.7     Chloride 103   100   99     CO2 20.6   24.2   27.1     Calcium 9.2   9.3   9.2       LIVER FUNCTION TESTS:      Lab 04/24/23  0440 04/23/23  0236 04/22/23  0335 04/20/23  2349 04/19/23  2331 04/19/23  0630   TOTAL PROTEIN 5.9* 6.1 5.6* 4.8* 5.0* 6.9   ALBUMIN 2.7* 2.7* 2.5* 2.1* 2.4* 3.3*   GLOBULIN   --   --   --   --  2.6 3.6   ALT (SGPT) 21 23 22 14 10 11   AST (SGOT) 19 25 31 25 19 10   BILIRUBIN 0.6 0.8 0.9 0.8 0.7 1.3*   INDIRECT BILIRUBIN 0.4 0.6 0.6 0.5  --   --    BILIRUBIN DIRECT 0.2 0.2 0.3 0.3  --   --    ALK PHOS 78 76 67 52 46 68       [x]  Microbiology   Blood Culture   Date Value Ref Range Status   04/19/2023 No growth at 2 days  Preliminary   04/19/2023 No growth at 2 days  Preliminary     No results found for: BCIDPCR, CXREFLEX, CSFCX, CULTURETIS  No results found for: CULTURES, HSVCX, URCX  No results found for: EYECULTURE, GCCX, HSVCULTURE, LABHSV  No results found for: LEGIONELLA, MRSACX, MUMPSCX, MYCOPLASCX  No results found for: NOCARDIACX, STOOLCX  No results found for: THROATCX, UNSTIMCULT, URINECX, CULTURE, VZVCULTUR  No results found for: VIRALCULTU, WOUNDCX    [x]  Radiology Adult Transthoracic Echo Complete W/ Cont if Necessary Per Protocol    Result Date: 4/20/2023  •  Technically difficult study. •  Left ventricular ejection fraction appears to be 51 - 55%.  No obvious regional wall motion abnormalities. •  Left ventricular diastolic function is consistent with (grade I) impaired relaxation and age. •  Mild aortic valve stenosis is present with max/mean pressure gradient 20/13 mmHg..     CT Chest With Contrast Diagnostic    Result Date: 4/19/2023  PROCEDURE: CT CHEST W CONTRAST DIAGNOSTIC  COMPARISON:  None INDICATIONS: shortness of breath  TECHNIQUE: After obtaining the patient's consent, CT images were obtained with non-ionic intravenous contrast material.   PROTOCOL:   Pulmonary embolism imaging protocol performed    RADIATION:   DLP: 525.7mGy*cm   Automated exposure control was utilized to minimize radiation dose. CONTRAST: 100cc Isovue 370 I.V.  FINDINGS:  There is advanced emphysematous lung disease.  There is airspace disease dependently in both lower lobes.  No nodules or masses are identified.  There is atherosclerotic disease in the aorta and coronary arteries.  There is  no mediastinal lymphadenopathy.  There are enlarged left hilar nodes measuring 2.3 by 1.3 cm.  There are borderline enlarged right hilar nodes.  Densely calcified nodes are present in the left hilum.  No acute findings are present in the upper abdomen.  Degenerative changes are present in the dorsal spine without definite bone destruction.  There are postoperative changes of left shoulder arthroplasty.  The aorta is of normal caliber.  There is significant motion degradation in the lung bases.  There are no obvious filling defects within the main or proximal pulmonary arteries.        1. Significant motion artifact is present in the bases.  No central pulmonary emboli are identified.  2. Extensive consolidation in both lower lobes raising the question of aspiration. 3. Advanced emphysematous lung disease 4. Extensive coronary artery atherosclerotic calcifications. 5. Left hilar lymphadenopathy with a 2.3 cm node.  There are densely calcified nodes in the left hilum as well.  Smaller nodes are present within the right hilum.  There is no mediastinal lymphadenopathy. 6. Postop changes of recent left shoulder arthroplasty.     Jordan Marrero MD       Electronically Signed and Approved By: Jordan Marrero MD on 4/19/2023 at 7:46             XR Chest 1 View    Result Date: 4/19/2023  PROCEDURE: XR CHEST 1 VW  COMPARISON: [facility], , CHEST AP/PA 1 VIEW, 12/03/2013, 16:00.  INDICATIONS: Dysrhythmia  FINDINGS:  Infiltrates are noted in the lung bases.  The cardiac silhouette and mediastinum are stable.  Aortic atherosclerosis is noted.  No acute osseous abnormalities are observed.        1. Bibasilar infiltrates are noted.  The findings may relate to bibasilar pneumonia.  Changes of CHF and pulmonary edema could also have this appearance.       ANAYA FELDMAN MD       Electronically Signed and Approved By: ANAYA FELDMAN MD on 4/19/2023 at 6:46             XR Shoulder 1 Vw LT    Result Date:  4/10/2023  REVIEWING YOUR TEST RESULTS IN [facility] IS NOT A SUBSTITUTE FOR DISCUSSING THOSE RESULTS WITH YOUR HEALTH CARE PROVIDER. PLEASE CONTACT YOUR PROVIDER VIA [facility] TO DISCUSS ANY QUESTIONS OR CONCERNS YOU MAY HAVE REGARDING THESE TEST RESULTS.  RADIOLOGY REPORT FACILITY:  [facility] UNIT/AGE/GENDER: JEAN-PIERREPERIOP  OP      AGE:82 Y          SEX:M PATIENT NAME/:  DAPHNE BUSBY E    1940 UNIT NUMBER:  [id number] ACCOUNT NUMBER:  [id number] ACCESSION NUMBER:  [id number] PORTABLE AP LEFT SHOULDER 1328 hours DATE: 04/10/2023 COMPARISON: 2023 INDICATION: Status post shoulder joint replacement. IMPRESSION: Patient is status post reverse total left shoulder arthroplasty. Prosthesis is in expected position and alignment in this single view. Negative for periprosthetic fracture. Surgical drain in place. Dictated by: Marlo Winters M.D. Images and Report reviewed and interpreted by: Marlo Winters M.D. <PS><Electronically signed by: Marlo Winters M.D.> 04/10/2023 1349 D: 04/10/2023 1348 T: 04/10/2023 1348      [x]  EKG/Telemetry   []  Cardiology/Vascular   []  Pathology  [x]  Old records  []  Other:    Assessment & Plan   Assessment / Plan     Assessment/Plan:    Assessment:  Atrial fibrillation rapid ventricular response  Multifocal atrial tachycardia  Multifocal pneumonia with concern for pneumococcal infection  Acute hypoxemic respiratory failure  Diabetic ketoacidosis  JUNIOR  Anion gap metabolic acidosis  Thyrotoxicosis/hyperthyroidism  Hypophosphatemia  Hypokalemia  Acute metabolic encephalopathy multifactorial likely related to sepsis  COPD  Dyslipidemia  Chronic diastolic CHF  Aortic valve stenosis     Plan:  Labs and imaging reviewed  X-ray right ankle  AM Uric acid  Continue telemetry monitoring while we titrate medications including Cardizem and metoprolol  Resumed on Cardizem 30 mg every 6 hours  Continued on metoprolol 50 mg XL twice daily  Continue  methimazole 10 mg 3 times daily  Will need outpatient referral to endocrinology  Continue PT/OT  Continue to wean oxygen per tolerance  Increase Levemir to 20 units twice daily  Continue insulin sliding scale coverage  Continue Plavix 75 mg daily  Cardiology recommendations appreciated  Discussed with critical care Dr. Boles, recommendations appreciated  Continue ceftriaxone 1 g daily to complete 7 days total  We will need TSH and free T4 reevaluated in the next few days; repeat in 2 to 3 days  Tamsulosin 0.4 mg daily continued  A.m. labs  Full code  DVT prophylaxis with Lovenox  Clinical course dictate further management   Discussed with nurse at the bedside  DVT prophylaxis:  Medical and mechanical DVT prophylaxis orders are present.    CODE STATUS:   Level Of Support Discussed With: Patient  Code Status (Patient has no pulse and is not breathing): CPR (Attempt to Resuscitate)  Medical Interventions (Patient has pulse or is breathing): Full Support  Release to patient: Routine Release    Electronically signed by Daphne Washington MD, 04/24/23, 4:06 PM EDT.    Portions of this documentation were transcribed electronically from a voice recognition software.  I confirm all data accurately represents the service(s) I performed at today's visit.

## 2023-04-24 NOTE — PLAN OF CARE
Goal Outcome Evaluation:  Plan of Care Reviewed With: patient, spouse        Progress: no change (first session: evaluation)  Outcome Evaluation: Patient presents with limitations of ROM, strength, balance, endurance/activity tolerance and cognition/safety awareness which impede his ability to perform ADL and transfers as prior.  The skills of a therapist will be required to safely and effectively implement treatment plan to restore maximal level of function.    Discharge recommendation: inpatient rehab, sub-acute rehab

## 2023-04-24 NOTE — PROGRESS NOTES
Pulmonary / Critical Care Progress Note      Patient Name: Cristi Maloney  : 1940  MRN: [id number]  Primary Care Physician:  Gonzalo Nugent MD  Date of admission: 2023    Subjective   Subjective   Follow-up for multifocal pneumonia, acute epoxy respiratory failure, COPD    Over past 24 hours, transferred out of ICU.    No acute events overnight.    This morning,  Lying in bed awake on 2 L nasal cannula with SPO2 94%  Feeling better  Continues to have congested cough  Reports small amount of thick sputum  No fever/chills  No chest pain    Review of Systems  General: Fatigue, otherwise denied complaints  HEENT: Denied complaints  Respiratory: Dyspnea, cough, otherwise denied complaints  Cardiovascular: Denied complaints  GI: Denied complaints  : Denied complaints  MSK: Denied complaints    Objective   Objective     Vitals:   Temp:  [97.3 °F (36.3 °C)-98.2 °F (36.8 °C)] 98.2 °F (36.8 °C)  Heart Rate:  [] 97  Resp:  [18-20] 20  BP: (120-143)/(55-75) 120/55  Flow (L/min):  [1-5] 5     Physical Exam   Vital Signs Reviewed   General:  Alert, NAD. Lying in bed   HEENT:  PERRL, EOMI.    Neck:  No JVD, no thyromegaly  Lymph: no axillary, cervical, supraclavicular lymphadenopathy noted bilaterally  Chest:  rhonchi noted bilaterally, no work of breathing noted on 2L NC  CV: RRR, no M/G/R, pulses 2+  Abd:  Soft, NT, ND, +BS  EXT:  no clubbing, no cyanosis, no edema  Neuro:  A&Ox3, CN grossly intact, no focal deficits.  Skin: No rashes or lesions noted    Result Review    Result Review:  I have personally reviewed the results from the time of this admission to 2023 14:50 EDT and agree with these findings:  [x]  Laboratory  [x]  Microbiology  [x]  Radiology  []  EKG/Telemetry   []  Cardiology/Vascular   []  Pathology  []  Old records  []  Other:  Most notable findings include:    Strep pneumo positive    Pro-David - 10.64  Lactate - 2.0  proBNP - 2283     CT chest -no pulmonary emboli, extensive  consolidation in bilateral lower lobes, advanced emphysematous lung disease, left hilar lymphadenopathy with densely calcified nodes, small nodes present in right hilum, no mediastinal lymphadenopathy    4/22 CXR - increasing airspace consolidation in lung bases         Lab 04/24/23  0440 04/23/23  0236 04/22/23  0335 04/21/23 2022 04/21/23  1556 04/21/23  1229 04/21/23  1043 04/21/23  0356 04/20/23  2349 04/20/23  0321 04/19/23  2331 04/19/23  1114 04/19/23  1010 04/19/23  0633 04/19/23  0630   WBC 12.07* 10.41 13.02*  --   --   --   --  14.29*  --   --  16.77*  --  16.05*  --  17.84*   HEMOGLOBIN 11.3* 11.5* 10.8*  --   --   --   --  9.3*  --   --  10.6*  --  12.9*  --  14.2   HEMATOCRIT 33.9* 34.4* 31.5*  --   --   --   --  27.2*  --   --  30.4*  --  38.4  --  42.7   PLATELETS 256 239 203  --   --   --   --  183  --   --  215  --  299  --  330   SODIUM 135* 136 137 134* 135* 132* 137 139 140   < > 144  144   < >  --   --  137   SODIUM, ARTERIAL  --   --   --   --   --   --   --   --   --   --   --   --   --    < >  --    POTASSIUM 3.7 4.0 4.4 4.3 4.6 4.2 3.9 4.0 3.7   < > 4.6  4.6   < >  --   --  3.2*   CHLORIDE 99 100 103 103 105 105 109* 111* 113*   < > 118*  118*   < >  --   --  100   CO2 27.1 24.2 20.6* 18.1* 16.6* 17.7* 19.4* 18.0* 19.4*   < > 16.9*  16.9*   < >  --   --  13.4*   BUN 13 14 15 15 14 14 13 15 16   < > 28*  28*   < >  --   --  39*   CREATININE 0.56* 0.52* 0.53* 0.51* 0.55* 0.50* 0.55* 0.53* 0.60*   < > 0.82  0.82   < >  --   --  1.45*   GLUCOSE 255* 187* 145* 166* 169* 122* 119* 145* 124*   < > 135*  135*   < >  --   --  606*   GLUCOSE, ARTERIAL  --   --   --   --   --   --   --   --   --   --   --   --   --    < >  --    CALCIUM 9.2 9.3 9.2 8.7 8.7 8.4* 8.5* 8.3* 8.5*   < > 8.4*  8.4*   < >  --   --  9.7   PHOSPHORUS 3.6 3.9 3.1 2.6 2.4* 2.2* 1.9* 2.3* 2.8   < > 2.6   < >  --   --  1.5*   TOTAL PROTEIN 5.9* 6.1 5.6*  --   --   --   --   --  4.8*  --  5.0*  --   --   --  6.9   ALBUMIN  2.7* 2.7* 2.5*  --   --   --   --   --  2.1*  --  2.4*  --   --   --  3.3*   GLOBULIN  --   --   --   --   --   --   --   --   --   --  2.6  --   --   --  3.6    < > = values in this interval not displayed.       Assessment & Plan   Assessment / Plan     Active Hospital Problems:  Active Hospital Problems    Diagnosis    • **Sepsis      Impression:  Multifocal pneumonia concern due to pneumococcal pneumonia  Acute hypoxic respiratory failure  COPD with centrilobular emphysema  Anion gap metabolic acidosis  Acute kidney injury likely prerenal  Hypokalemia  Hypophosphatemia  Altered mental status likely metabolic encephalopathy from sepsis    Plan:  -Currently on 2 L per nasal cannula, continue to wean to maintain SPO2 greater than 90%  -Continue ceftriaxone day 5/7 for pneumococcal pneumonia  -Completed 3-day course of azithromycin  -We will repeat chest x-ray in a.m.  -Recheck procalcitonin  -Continue Xopenex, Brovana and Pulmicort added  -Continue bronchopulmonary hygiene  -MetaNeb and saline nebs added  -Encourage I-S/flutter valve  -PT/OT on board, appreciate assistance  -Encourage mobilization, out of bed to chair    DVT prophylaxis:  Medical and mechanical DVT prophylaxis orders are present.    CODE STATUS:   Level Of Support Discussed With: Patient  Code Status (Patient has no pulse and is not breathing): CPR (Attempt to Resuscitate)  Medical Interventions (Patient has pulse or is breathing): Full Support  Release to patient: Routine Release    This patient was seen by both a physician and a NP. Geneva CABRAL MD, spent >50% of time in accordance with split shared billing. This included personally reviewing all pertinent labs, imaging, microbiology and documentation. Also discussing the case with the patient and any available family, the admitting physician and any available ancillary staff.     Electronically signed by Geneva Boles MD, 04/24/23, 5:05 PM EDT.

## 2023-04-24 NOTE — PROGRESS NOTES
[facility]     Cardiology Progress Note    Patient Name: Cristi Maloney  : 1940  MRN: [id number]  Primary Care Physician:  Gonzalo Nugent MD  Date of admission: 2023    Subjective   Subjective   Chief complaint  Tachycardia    HPI:  Patient Reports overall he feels well.  He does note that he is weak from the long hospital stay.  He also notes some right ankle pain.    Review of Systems   All systems were reviewed and negative except for: Cardiac review of systems negative.    Objective   Objective     Vitals:   Temp:  [97.6 °F (36.4 °C)-97.9 °F (36.6 °C)] 97.6 °F (36.4 °C)  Heart Rate:  [] 115  Resp:  [18-22] 18  BP: (129-146)/(55-75) 142/75  Flow (L/min):  [2-3.5] 2  Physical Exam   Neck: no JVD, no bruit  Lungs: clear to ausculation bilaterally.  No crackles or wheezes  CV: regular rate and rhythm, no murmur  Ext: no cyanosis, clubbing or edema.  Normal bilateral LE pulses.      Scheduled Meds:cefTRIAXone, 1 g, Intravenous, Q24H  clopidogrel, 75 mg, Oral, Daily  enoxaparin, 40 mg, Subcutaneous, Q24H  insulin detemir, 15 Units, Subcutaneous, Q12H  insulin lispro, 2-9 Units, Subcutaneous, TID With Meals  levalbuterol, 1.25 mg, Nebulization, Q6H - RT  methIMAzole, 10 mg, Oral, Q8H  metoprolol succinate XL, 50 mg, Oral, Q12H  montelukast, 10 mg, Oral, Nightly  senna-docusate sodium, 2 tablet, Oral, BID  sodium chloride, 10 mL, Intravenous, Q12H  sodium chloride, 10 mL, Intravenous, Q12H  sodium chloride, 4 mL, Nebulization, BID - RT  tamsulosin, 0.4 mg, Oral, Daily      Continuous Infusions:Pharmacy to Dose enoxaparin (LOVENOX),            Result Review    Result Review:  I have personally reviewed the results from the time of this admission to 2023 07:50 EDT and agree with these findings:  [x]  Laboratory  []  Microbiology  [x]  Radiology  [x]  EKG/Telemetry   [x]  Cardiology/Vascular   []  Pathology  []  Old records  []  Other:  Most notable findings include:     CBC        2023     03:35 4/23/2023    02:36 4/24/2023    04:40   CBC   WBC 13.02   10.41   12.07     RBC 3.37   3.67   3.58     Hemoglobin 10.8   11.5   11.3     Hematocrit 31.5   34.4   33.9     MCV 93.5   93.7   94.7     MCH 32.0   31.3   31.6     MCHC 34.3   33.4   33.3     RDW 14.5   14.3   13.8     Platelets 203   239   256       CMP        4/22/2023    03:35 4/23/2023    02:36 4/24/2023    04:40   CMP   Glucose 145   187   255     BUN 15   14   13     Creatinine 0.53   0.52   0.56     EGFR 100.7   101.3   99.0     Sodium 137   136   135     Potassium 4.4   4.0   3.7     Chloride 103   100   99     Calcium 9.2   9.3   9.2     Total Protein 5.6   6.1   5.9     Albumin 2.5   2.7   2.7     Total Bilirubin 0.9   0.8   0.6     Alkaline Phosphatase 67   76   78     AST (SGOT) 31   25   19     ALT (SGPT) 22   23   21     BUN/Creatinine Ratio 28.3   26.9   23.2     Anion Gap 13.4   11.8   8.9        CARDIAC LABS:      Lab 04/19/23  1300 04/19/23  1114 04/19/23  0630   PROBNP  --   --  2,283.0*   HSTROP T 91* 89* 99*   PROTIME  --   --  15.1*   INR  --   --  1.19*        Assessment & Plan   Assessment / Plan     Brief Patient Summary:  Cristi Maloney is a 82 y.o. male who has a history of coronary artery disease status post stent to the PL branch of the RCA back in 2014 and another stent approximately 2 years ago to unknown vessel.  He also has a history of diabetes, hypertension and hyperlipidemia.  He smoked all the way up until about 4 years ago.  They state he has had a stomach type of virus since Saturday with nausea and vomiting.  When EMS picked him up he was very tachycardic.  Apparently they shocked him with 50 and 100 J with no change.  Once he got to the ED he was shocked with up to 200 J multiple times with no change.  He was also given adenosine 6 and 12 mg with no change.    Active Hospital Problems:  Active Hospital Problems    Diagnosis    • **Sepsis        Assessment:  1.  Multifocal atrial tachycardia  2.  Possible  DKA  3.  Chest x-ray and CAT scan with possible aspiration pneumonia.     Plan:   1.    Continue the metoprolol.  He he was put on this after he was found to be hyperthyroid.  His heart rate is still remaining somewhat elevated.  With the multifocal atrial tachycardia can add back a little bit of the Cardizem.  We will start at 30 mg p.o. every 6 hours.  When he is discharged this can be switched to Cardizem  daily.  2.  Patient does not require anticoagulation at this time from a cardiac standpoint.  3.  Chest x-ray with bibasilar infiltrates also seen on CT of the chest concerning for aspiration pneumonia.  There is no obvious pulmonary embolism.  Emphysematous changes were noted along with the left hilar lymphadenopathy.  4.  Patient had Cardiolite stress test and echo back in 2020 that showed normal ejection fraction and no ischemia.   5.  Patient does appear to be improving.  From a cardiac standpoint we will follow from a distance.  Call with any questions.  He will need follow-up with his primary cardiologist at discharge.    CODE STATUS:   Level Of Support Discussed With: Patient  Code Status (Patient has no pulse and is not breathing): CPR (Attempt to Resuscitate)  Medical Interventions (Patient has pulse or is breathing): Full Support  Release to patient: Routine Release      Electronically signed by Sachin Garcia MD, 04/24/23, 7:50 AM EDT.

## 2023-04-24 NOTE — THERAPY EVALUATION
Patient Name: Cristi Maloney  : 1940    MRN: [id number]                              Today's Date: 2023       Admit Date: 2023    Visit Dx:     ICD-10-CM ICD-9-CM   1. Atrial fibrillation with rapid ventricular response  I48.91 427.31   2. Septic shock  A41.9 038.9    R65.21 785.52     995.92   3. Pneumonia of both lower lobes due to infectious organism  J18.9 486   4. Hypoxia  R09.02 799.02   5. Diabetic ketoacidosis without coma associated with other specified diabetes mellitus  E13.10 250.12   6. Acute respiratory failure with hypoxia  J96.01 518.81   7. Dysphagia, oropharyngeal  R13.12 787.22   8. Difficulty in walking  R26.2 719.7   9. Decreased activities of daily living (ADL)  Z78.9 V49.89     Patient Active Problem List   Diagnosis   • Sepsis     Past Medical History:   Diagnosis Date   • CHF (congestive heart failure)    • Elevated cholesterol    • Hypertension      Past Surgical History:   Procedure Laterality Date   • JOINT REPLACEMENT        General Information     Row Name 23 0938          OT Time and Intention    Document Type evaluation  -AV     Mode of Treatment individual therapy;occupational therapy  -AV     Row Name 23 0938          General Information    Patient Profile Reviewed yes  -AV     Prior Level of Function independent:;ADL's;transfer;all household mobility  Prior to shoulder surgery 4/10/23, independent ADL and home management tasks. stands to shower (tub w grab bar). stands to groom. standard commode. ambulates without assistive device. home home O2.  -AV     Existing Precautions/Restrictions fall;shoulder;non-weight bearing;oxygen therapy device and L/min  NWB LUE (shoulder surgery 4/10/23). LUE immobilizer. mechanical ground diet/ nectar thick liquids.  -AV     Barriers to Rehab none identified  -AV     Row Name 23 0938          Occupational Profile    Reason for Services/Referral (Occupational Profile) Patient is a 82 year old male admitted to  [facility] on 4/19/2023 with shortness of air and altered mental status. He is currently on 4th floor/ 2L O2.   OT consulted due to recent decline in ADL/transfer independence.  No previous OT services for current condition.  -AV     Row Name 04/24/23 0938          Living Environment    People in Home spouse  -AV     Row Name 04/24/23 0938          Home Main Entrance    Number of Stairs, Main Entrance none  -AV     Row Name 04/24/23 0938          Stairs Within Home, Primary    Number of Stairs, Within Home, Primary none  -AV     Row Name 04/24/23 0938          Cognition    Orientation Status (Cognition) --  Alert, pleasant and cooperative.  Looks to wife for answering questions.  Able to follow commands.  Questionable insight/safety awareness.  -AV     Row Name 04/24/23 0938          Safety Issues, Functional Mobility    Impairments Affecting Function (Mobility) balance;range of motion (ROM);endurance/activity tolerance;strength;cognition  -AV           User Key  (r) = Recorded By, (t) = Taken By, (c) = Cosigned By    Initials Name Provider Type    Rajan Ballesteros OT Occupational Therapist                 Mobility/ADL's     Row Name 04/24/23 0942          Bed Mobility    Comment, (Bed Mobility) Max assist per PT.  Unable to transfer supine to long sitting with max assist at this time.  -AV     Row Name 04/24/23 0942          Activities of Daily Living    BADL Assessment/Intervention --  Min assist feeding with set up.  Mod assist grooming with set up in bed.  Max assist bathing and dressing.  Dependent toilet hygiene.  -AV     Row Name 04/24/23 0942          Mobility    Extremity Weight-bearing Status left upper extremity  -AV     Left Upper Extremity (Weight-bearing Status) non weight-bearing (NWB)  Shoulder surgery 4/10/2023.  -AV           User Key  (r) = Recorded By, (t) = Taken By, (c) = Cosigned By    Initials Name Provider Type    Rajan Ballesteros OT Occupational Therapist                Obj/Interventions     Row Name 04/24/23 0942          Sensory Assessment (Somatosensory)    Sensory Assessment (Somatosensory) UE sensation intact  -AV     Sensory Assessment Sensory discrimination to light touch intact bilateral upper extremities  -AV     Row Name 04/24/23 0942          Vision Assessment/Intervention    Visual Impairment/Limitations WFL  -AV     Row Name 04/24/23 0942          Range of Motion Comprehensive    General Range of Motion upper extremity range of motion deficits identified  -AV     Comment, General Range of Motion left shoulder AROM <25 degrees.  left elbow & wrist WFL and left opposition to #4 only.  Right upper extremity AROM WFL.  -AV     Row Name 04/24/23 0942          Strength Comprehensive (MMT)    Comment, General Manual Muscle Testing (MMT) Assessment Left  4(-)/5 with further LUE testing deferred.  RUE 5/5.  -AV     Row Name 04/24/23 0942          Motor Skills    Motor Skills coordination;functional endurance  -AV     Coordination --  right dominant  -AV     Functional Endurance poor  -AV     Row Name 04/24/23 0942          Balance    Comment, Balance mod-max with PT yesterday. unable to assess currently.  -AV     Row Name 04/24/23 0942          General Upper Extremity Assessment (Range of Motion)    Comment: Upper Extremity ROM noted immobilizer on bedside table. pt/ wife report he doesn't like to wear it. OT encouraged immobilizer use at all times other than while exercising and bathing/dressing for proper UE positioning.  -AV           User Key  (r) = Recorded By, (t) = Taken By, (c) = Cosigned By    Initials Name Provider Type    AV Rajan Seymour OT Occupational Therapist               Goals/Plan     Row Name 04/24/23 0948          Transfer Goal 1 (OT)    Activity/Assistive Device (Transfer Goal 1, OT) transfers, all;walker, rolling  -AV     [place] Level/Cues Needed (Transfer Goal 1, OT) supervision required;verbal cues required  -AV     Time Frame (Transfer Goal  1, OT) long term goal (LTG);10 days  -AV     Row Name 04/24/23 0948          Bathing Goal 1 (OT)    Activity/Device (Bathing Goal 1, OT) bathing skills, all;tub bench  -AV     [place] Level/Cues Needed (Bathing Goal 1, OT) supervision required;set-up required;verbal cues required  -AV     Time Frame (Bathing Goal 1, OT) long term goal (LTG);10 days  -AV     Row Name 04/24/23 0948          Dressing Goal 1 (OT)    Activity/Device (Dressing Goal 1, OT) dressing skills, all  -AV     [place]/Cues Needed (Dressing Goal 1, OT) supervision required;set-up required;verbal cues required  -AV     Time Frame (Dressing Goal 1, OT) long term goal (LTG);10 days  -AV     Row Name 04/24/23 0948          Toileting Goal 1 (OT)    Activity/Device (Toileting Goal 1, OT) toileting skills, all;raised toilet seat  -AV     [place] Level/Cues Needed (Toileting Goal 1, OT) set-up required;supervision required;verbal cues required  -AV     Time Frame (Toileting Goal 1, OT) long term goal (LTG);10 days  -AV     Row Name 04/24/23 0948          Grooming Goal 1 (OT)    Activity/Device (Grooming Goal 1, OT) grooming skills, all  -AV     [place] (Grooming Goal 1, OT) supervision required;set-up required;verbal cues required  -AV     Time Frame (Grooming Goal 1, OT) long term goal (LTG);10 days  -AV     Row Name 04/24/23 0948          Self-Feeding Goal 1 (OT)    Activity/Device (Self-Feeding Goal 1, OT) self-feeding skills, all  -AV     [place] Level/Cues Needed (Self-Feeding Goal 1, OT) supervision required;set-up required;verbal cues required  -AV     Time Frame (Self-Feeding Goal 1, OT) long term goal (LTG);10 days  -AV     Row Name 04/24/23 0948          ROM Goal 1 (OT)    ROM Goal 1 (OT) Patient will demonstrate 90 degrees left shoulder AROM to increase ADL independence.  -AV     Time Frame (ROM Goal 1, OT) long term goal (LTG);10 days  -AV     Row Name 04/24/23 0948          Strength Goal 1 (OT)    Strength Goal 1 (OT)  Patient will demonstrate 4+/5 left  to increase ADL independence.  -AV     Time Frame (Strength Goal 1, OT) long term goal (LTG);10 days  -AV     Row Name 04/24/23 0915          Problem Specific Goal 1 (OT)    Problem Specific Goal 1 (OT) Patient will demonstrate fair plus endurance/ activity tolerance needed to support ADLs.  -AV     Time Frame (Problem Specific Goal 1, OT) long term goal (LTG);10 days  -AV     Row Name 04/24/23 0904          Therapy Assessment/Plan (OT)    Planned Therapy Interventions (OT) activity tolerance training;BADL retraining;functional balance retraining;occupation/activity based interventions;patient/caregiver education/training;transfer/mobility retraining;strengthening exercise;ROM/therapeutic exercise  -AV           User Key  (r) = Recorded By, (t) = Taken By, (c) = Cosigned By    Initials Name Provider Type    AV Rajan Seymour OT Occupational Therapist               Clinical Impression     Row Name 04/24/23 0974          Pain Assessment    Additional Documentation Pain Scale: FACES Pre/Post-Treatment (Group)  -AV     Row Name 04/24/23 0941          Pain Scale: FACES Pre/Post-Treatment    Pain: FACES Scale, Pretreatment 2-->hurts little bit  -AV     Posttreatment Pain Rating 2-->hurts little bit  -AV     Pain Location generalized  -AV     Row Name 04/24/23 0950          Plan of Care Review    Plan of Care Reviewed With patient;spouse  -AV     Progress no change  first session: evaluation  -AV     Outcome Evaluation Patient presents with limitations of ROM, strength, balance, endurance/activity tolerance and cognition/safety awareness which impede his ability to perform ADL and transfers as prior.  The skills of a therapist will be required to safely and effectively implement treatment plan to restore maximal level of function.  -AV     Row Name 04/24/23 0900          Therapy Assessment/Plan (OT)    Patient/Family Therapy Goal Statement (OT) regain independence  -AV     Rehab  Potential (OT) good, to achieve stated therapy goals  -AV     Criteria for Skilled Therapeutic Interventions Met (OT) yes;meets criteria;skilled treatment is necessary  -AV     Therapy Frequency (OT) 5 times/wk  -AV     Row Name 04/24/23 0947          Therapy Plan Review/Discharge Plan (OT)    Anticipated Discharge Disposition (OT) inpatient rehabilitation facility;sub acute care setting  -AV     Row Name 04/24/23 0947          Vital Signs    O2 Delivery Pre Treatment nasal cannula  2  -AV     O2 Delivery Intra Treatment nasal cannula  2  -AV     O2 Delivery Post Treatment nasal cannula  2  -AV           User Key  (r) = Recorded By, (t) = Taken By, (c) = Cosigned By    Initials Name Provider Type    Rajan Ballesteros, LEANN Occupational Therapist               Outcome Measures     Row Name 04/24/23 0903          How much help from another is currently needed...    Putting on and taking off regular lower body clothing? 2  -AV     Bathing (including washing, rinsing, and drying) 2  -AV     Toileting (which includes using toilet bed pan or urinal) 1  -AV     Putting on and taking off regular upper body clothing 2  -AV     Taking care of personal grooming (such as brushing teeth) 2  -AV     Eating meals 3  -AV     AM-PAC 6 Clicks Score (OT) 12  -AV     Row Name 04/24/23 0830          How much help from another person do you currently need...    Turning from your back to your side while in flat bed without using bedrails? 2  -RB     Moving from lying on back to sitting on the side of a flat bed without bedrails? 2  -RB     Moving to and from a bed to a chair (including a wheelchair)? 2  -RB     Standing up from a chair using your arms (e.g., wheelchair, bedside chair)? 2  -RB     Climbing 3-5 steps with a railing? 1  -RB     To walk in hospital room? 1  -RB     AM-PAC 6 Clicks Score (PT) 10  -RB     Highest level of mobility 4 --> Transferred to chair/commode  -RB     Row Name 04/24/23 0969          Functional Assessment     Outcome Measure Options AM-PAC 6 Clicks Daily Activity (OT);Optimal Instrument  -AV     Row Name 04/24/23 0950          Optimal Instrument    Optimal Instrument Optimal - 3  -AV     Bending/Stooping 5  -AV     Standing 5  -AV     Reaching 3  -AV     From the list, choose the 3 activities you would most like to be able to do without any difficulty Bending/stooping;Standing;Reaching  -AV     Total Score Optimal - 3 13  -AV           User Key  (r) = Recorded By, (t) = Taken By, (c) = Cosigned By    Initials Name Provider Type    Ameena Larkin, RN Registered Nurse    Rajan Ballesteros OT Occupational Therapist                Occupational Therapy Education     Title: PT OT SLP Therapies (Done)     Topic: Occupational Therapy (Done)     Point: ADL training (Done)     Description:   Instruct learner(s) on proper safety adaptation and remediation techniques during self care or transfers.   Instruct in proper use of assistive devices.              Learning Progress Summary           Patient Acceptance, E, VU by JOSH at 4/24/2023 0951    Comment: MAKAYLA PAYAN  shoulder precautions/ immobilizer   Significant Other Acceptance, E, VU by AV at 4/24/2023 0951    Comment: MAKAYLA PAYAN  shoulder precautions/ immobilizer                   Point: Home exercise program (Done)     Description:   Instruct learner(s) on appropriate technique for monitoring, assisting and/or progressing therapeutic exercises/activities.              Learning Progress Summary           Patient Acceptance, E, VU by JOSH at 4/24/2023 0951    Comment: MAKAYLA PAYAN  shoulder precautions/ immobilizer   Significant Other Acceptance, E, VU by JOSH at 4/24/2023 0951    Comment: MAKAYLA PAYAN  shoulder precautions/ immobilizer                   Point: Precautions (Done)     Description:   Instruct learner(s) on prescribed precautions during self-care and functional transfers.              Learning Progress Summary           Patient Acceptance, E, VU by JOSH at 4/24/2023 0951    Comment: MAKAYLA  LUE  shoulder precautions/ immobilizer   Significant Other Acceptance, E, VU by AV at 4/24/2023 0951    Comment: MAKAYLA PAYAN  shoulder precautions/ immobilizer                   Point: Body mechanics (Done)     Description:   Instruct learner(s) on proper positioning and spine alignment during self-care, functional mobility activities and/or exercises.              Learning Progress Summary           Patient Acceptance, E, VU by AV at 4/24/2023 0951    Comment: NWB LUE  shoulder precautions/ immobilizer   Significant Other Acceptance, E, VU by AV at 4/24/2023 0951    Comment: NWANIYA PAYAN  shoulder precautions/ immobilizer                               User Key     Initials Effective Dates Name Provider Type Discipline    AV 06/16/21 -  Rajan Seymour OT Occupational Therapist OT              OT Recommendation and Plan  Planned Therapy Interventions (OT): activity tolerance training, BADL retraining, functional balance retraining, occupation/activity based interventions, patient/caregiver education/training, transfer/mobility retraining, strengthening exercise, ROM/therapeutic exercise  Therapy Frequency (OT): 5 times/wk  Plan of Care Review  Plan of Care Reviewed With: patient, spouse  Progress: no change (first session: evaluation)  Outcome Evaluation: Patient presents with limitations of ROM, strength, balance, endurance/activity tolerance and cognition/safety awareness which impede his ability to perform ADL and transfers as prior.  The skills of a therapist will be required to safely and effectively implement treatment plan to restore maximal level of function.     Time Calculation:    Time Calculation- OT     Row Name 04/24/23 0953             Time Calculation- OT    OT Received On 04/24/23  -AV      OT Goal Re-Cert Due Date 05/03/23  -AV         Untimed Charges    OT Eval/Re-eval Minutes 35  -AV         Total Minutes    Untimed Charges Total Minutes 35  -AV       Total Minutes 35  -AV            User Key  (r) =  Recorded By, (t) = Taken By, (c) = Cosigned By    Initials Name Provider Type    AV Rajan Seymour, LEANN Occupational Therapist              Therapy Charges for Today     Code Description Service Date Service Provider Modifiers Qty    71423802086 HC OT EVAL MOD COMPLEXITY 3 4/24/2023 Rajan Seymour OT GO 1               Rajan Seymour OT  4/24/2023

## 2023-04-24 NOTE — THERAPY TREATMENT NOTE
Acute Care - Physical Therapy Treatment Note   [place]     Patient Name: Cristi Maloney  : 1940  MRN: [id number]  Today's Date: 2023   Onset of Illness/Injury or Date of Surgery: 23  Visit Dx:     ICD-10-CM ICD-9-CM   1. Atrial fibrillation with rapid ventricular response  I48.91 427.31   2. Septic shock  A41.9 038.9    R65.21 785.52     995.92   3. Pneumonia of both lower lobes due to infectious organism  J18.9 486   4. Hypoxia  R09.02 799.02   5. Diabetic ketoacidosis without coma associated with other specified diabetes mellitus  E13.10 250.12   6. Acute respiratory failure with hypoxia  J96.01 518.81   7. Dysphagia, oropharyngeal  R13.12 787.22   8. Difficulty in walking  R26.2 719.7   9. Decreased activities of daily living (ADL)  Z78.9 V49.89     Patient Active Problem List   Diagnosis   • Sepsis     Past Medical History:   Diagnosis Date   • CHF (congestive heart failure)    • Elevated cholesterol    • Hypertension      Past Surgical History:   Procedure Laterality Date   • JOINT REPLACEMENT       PT Assessment (last 12 hours)     PT Evaluation and Treatment     Row Name 23 1513          Physical Therapy Time and Intention    Subjective Information no complaints (P)   -JF     Document Type therapy note (daily note) (P)   -JF     Mode of Treatment individual therapy;physical therapy (P)   -JF     Patient Effort good (P)   -JF     Symptoms Noted During/After Treatment none (P)   -JF     Row Name 23 1513          General Information    Patient Profile Reviewed yes (P)   -JF     Patient Observations alert;cooperative;agree to therapy (P)   -JF     General Observations of Patient Patient was on 4.5 LPM of o2 (P)   -JF     Existing Precautions/Restrictions fall;shoulder;non-weight bearing;oxygen therapy device and L/min (P)   -JF     Barriers to Rehab none identified (P)   -JF     Row Name 23 1513          Mobility    Extremity Weight-bearing Status left upper extremity (P)   -JF      Left Upper Extremity (Weight-bearing Status) non weight-bearing (NWB) (P)   Shoulder surgery 4/10/23  -     Row Name 04/24/23 1513          Bed Mobility    Bed Mobility supine-sit;sit-supine (P)   -     Supine-Sit [place] (Bed Mobility) maximum assist (25% patient effort) (P)   -     Sit-Supine [place] (Bed Mobility) maximum assist (25% patient effort) (P)   -     Bed Mobility, Safety Issues decreased use of arms for pushing/pulling;decreased use of legs for bridging/pushing (P)   -     Assistive Device (Bed Mobility) draw sheet;head of bed elevated (P)   -     Row Name 04/24/23 1513          Transfers    Transfers sit-stand transfer (P)   -     Row Name 04/24/23 1513          Sit-Stand Transfer    Sit-Stand [place] (Transfers) moderate assist (50% patient effort);2 person assist (P)   -     Assistive Device (Sit-Stand Transfers) other (see comments) (P)   none  -[facility] Name 04/24/23 1513          Gait/Stairs (Locomotion)    Gait/Stairs Locomotion gait/ambulation independence;gait/ambulation assistive device (P)   -     [place] Level (Gait) moderate assist (50% patient effort);2 person assist (P)   -     Assistive Device (Gait) other (see comments) (P)   none  -     Distance in Feet (Gait) 3 side steps (P)   -[facility] Name 04/24/23 1513          Safety Issues, Functional Mobility    Impairments Affecting Function (Mobility) balance;endurance/activity tolerance;strength (P)   -[facility] Name 04/24/23 1513          Balance    Balance Assessment standing dynamic balance (P)   -     Dynamic Standing Balance moderate assist;2-person assist (P)   -     Position/Device Used, Standing Balance supported (P)   Guarded patient on both sides  -[facility] Name 04/24/23 1513          Motor Skills    Therapeutic Exercise hip;knee;ankle (P)   -[facility] Name 04/24/23 1513          Hip (Therapeutic Exercise)    Hip (Therapeutic Exercise) strengthening exercise (P)   -      Hip Strengthening (Therapeutic Exercise) bilateral;marching while seated;sitting;10 repetitions (P)   -     Row Name 04/24/23 1513          Knee (Therapeutic Exercise)    Knee (Therapeutic Exercise) AROM (active range of motion) (P)   -     Knee AROM (Therapeutic Exercise) bilateral;LAQ (long arc quad);sitting;10 repetitions (P)   -     Row Name 04/24/23 1513          Ankle (Therapeutic Exercise)    Ankle (Therapeutic Exercise) AROM (active range of motion) (P)   -     Ankle AROM (Therapeutic Exercise) bilateral;dorsiflexion;plantarflexion;sitting;10 repetitions (P)   -     Row Name 04/24/23 1513          Progress Summary (PT)    Progress Toward Functional Goals (PT) progress toward functional goals is good (P)   -BRISSA     Daily Progress Summary (PT) Patient tolerated standing and taking a few side steps today along with therapeutic exercise while sitting edge of bed. He will continue to benefit from physical therapy services while in the hospital. (P)   -BRISSA           User Key  (r) = Recorded By, (t) = Taken By, (c) = Cosigned By    Initials Name Provider Type    Coy De Anda, PT Student PT Student                Physical Therapy Education     Title: PT OT SLP Therapies (Done)     Topic: Physical Therapy (Done)     Point: Mobility training (Done)     Learning Progress Summary           Patient Acceptance, E, VU by AV at 4/24/2023 0951    Comment: MAKAYLA PAYAN  shoulder precautions/ immobilizer    Acceptance, E,TB, VU by ARI at 4/23/2023 1256   Significant Other Acceptance, E, VU by AV at 4/24/2023 0951    Comment: MAKAYLA PAYAN  shoulder precautions/ immobilizer                   Point: Home exercise program (Done)     Learning Progress Summary           Patient Acceptance, E, VU by AV at 4/24/2023 0951    Comment: MAKAYLA PAYAN  shoulder precautions/ immobilizer    Acceptance, E,TB, VU by ARI at 4/23/2023 1256   Significant Other Acceptance, E, VU by AV at 4/24/2023 0951    Comment: MAKAYLA PAYAN  shoulder precautions/  immobilizer                   Point: Body mechanics (Done)     Learning Progress Summary           Patient Acceptance, E, VU by AV at 4/24/2023 0951    Comment: NWB LUE  shoulder precautions/ immobilizer    Acceptance, E,TB, VU by DW at 4/23/2023 1256   Significant Other Acceptance, E, VU by AV at 4/24/2023 0951    Comment: NWB LUE  shoulder precautions/ immobilizer                   Point: Precautions (Done)     Learning Progress Summary           Patient Acceptance, E, VU by AV at 4/24/2023 0951    Comment: NWB LUE  shoulder precautions/ immobilizer    Acceptance, E,TB, VU by DW at 4/23/2023 1256   Significant Other Acceptance, E, VU by AV at 4/24/2023 0951    Comment: NWB LUE  shoulder precautions/ immobilizer                               User Key     Initials Effective Dates Name Provider Type Discipline    AV 06/16/21 -  Rajan Seymour, OT Occupational Therapist OT    ARI 04/25/21 -  Jose M Cutler, PT Physical Therapist PT              PT Recommendation and Plan     Progress Summary (PT)  Progress Toward Functional Goals (PT): (P) progress toward functional goals is good  Daily Progress Summary (PT): (P) Patient tolerated standing and taking a few side steps today along with therapeutic exercise while sitting edge of bed. He will continue to benefit from physical therapy services while in the hospital.   Outcome Measures     Row Name 04/24/23 1500 04/23/23 1255          How much help from another person do you currently need...    Turning from your back to your side while in flat bed without using bedrails? 2 (P)   -JF 2  -DW     Moving from lying on back to sitting on the side of a flat bed without bedrails? 2 (P)   -JF 2  -DW     Moving to and from a bed to a chair (including a wheelchair)? 2 (P)   -JF 2  -DW     Standing up from a chair using your arms (e.g., wheelchair, bedside chair)? 2 (P)   -JF 2  -DW     Climbing 3-5 steps with a railing? 1 (P)   -JF 1  -DW     To walk in hospital room? 2 (P)   -JF 1   -DW     AM-PAC 6 Clicks Score (PT) 11 (P)   -JF 10  -DW        Functional Assessment    Outcome Measure Options AM-PAC 6 Clicks Basic Mobility (PT) (P)   -JF AM-PAC 6 Clicks Basic Mobility (PT)  -DW           User Key  (r) = Recorded By, (t) = Taken By, (c) = Cosigned By    Initials Name Provider Type    Jose M Rojas, PT Physical Therapist    Coy De Anda, PT Student PT Student                 Time Calculation:    PT Charges     Row Name 04/24/23 1524             Time Calculation    PT Received On 04/24/23 (P)   -JF         Timed Charges    06370 - PT Therapeutic Exercise Minutes 10 (P)   -JF         Total Minutes    Timed Charges Total Minutes 10 (P)   -JF       Total Minutes 10 (P)   -JF            User Key  (r) = Recorded By, (t) = Taken By, (c) = Cosigned By    Initials Name Provider Type    Coy De Anda, PT Student PT Student              Therapy Charges for Today     Code Description Service Date Service Provider Modifiers Qty    30513680282 HC PT THER PROC EA 15 MIN 4/24/2023 Coy Flores, PT Student GP 1          PT G-Codes  Outcome Measure Options: (P) AM-PAC 6 Clicks Basic Mobility (PT)  AM-PAC 6 Clicks Score (PT): (P) 11  AM-PAC 6 Clicks Score (OT): 12    Coy Flores PT Student  4/24/2023

## 2023-04-25 ENCOUNTER — APPOINTMENT (OUTPATIENT)
Dept: GENERAL RADIOLOGY | Facility: HOSPITAL | Age: 83
End: 2023-04-25
Payer: MEDICARE

## 2023-04-25 LAB
ALBUMIN SERPL-MCNC: 2.8 G/DL (ref 3.5–5.2)
ALP SERPL-CCNC: 78 U/L (ref 39–117)
ALT SERPL W P-5'-P-CCNC: 21 U/L (ref 1–41)
ANION GAP SERPL CALCULATED.3IONS-SCNC: 8.3 MMOL/L (ref 5–15)
AST SERPL-CCNC: 19 U/L (ref 1–40)
BASOPHILS # BLD AUTO: 0.06 10*3/MM3 (ref 0–0.2)
BASOPHILS NFR BLD AUTO: 0.5 % (ref 0–1.5)
BILIRUB CONJ SERPL-MCNC: 0.2 MG/DL (ref 0–0.3)
BILIRUB INDIRECT SERPL-MCNC: 0.4 MG/DL
BILIRUB SERPL-MCNC: 0.6 MG/DL (ref 0–1.2)
BUN SERPL-MCNC: 16 MG/DL (ref 8–23)
BUN/CREAT SERPL: 27.6 (ref 7–25)
CALCIUM SPEC-SCNC: 9.1 MG/DL (ref 8.6–10.5)
CHLORIDE SERPL-SCNC: 100 MMOL/L (ref 98–107)
CO2 SERPL-SCNC: 26.7 MMOL/L (ref 22–29)
CREAT SERPL-MCNC: 0.58 MG/DL (ref 0.76–1.27)
DEPRECATED RDW RBC AUTO: 47.5 FL (ref 37–54)
EGFRCR SERPLBLD CKD-EPI 2021: 98 ML/MIN/1.73
EOSINOPHIL # BLD AUTO: 0.16 10*3/MM3 (ref 0–0.4)
EOSINOPHIL NFR BLD AUTO: 1.3 % (ref 0.3–6.2)
ERYTHROCYTE [DISTWIDTH] IN BLOOD BY AUTOMATED COUNT: 13.8 % (ref 12.3–15.4)
GLUCOSE BLDC GLUCOMTR-MCNC: 195 MG/DL (ref 70–99)
GLUCOSE BLDC GLUCOMTR-MCNC: 241 MG/DL (ref 70–99)
GLUCOSE BLDC GLUCOMTR-MCNC: 273 MG/DL (ref 70–99)
GLUCOSE SERPL-MCNC: 189 MG/DL (ref 65–99)
HCT VFR BLD AUTO: 34 % (ref 37.5–51)
HGB BLD-MCNC: 11.3 G/DL (ref 13–17.7)
IMM GRANULOCYTES # BLD AUTO: 0.78 10*3/MM3 (ref 0–0.05)
IMM GRANULOCYTES NFR BLD AUTO: 6.6 % (ref 0–0.5)
LYMPHOCYTES # BLD AUTO: 1.7 10*3/MM3 (ref 0.7–3.1)
LYMPHOCYTES NFR BLD AUTO: 14.3 % (ref 19.6–45.3)
MAGNESIUM SERPL-MCNC: 1.6 MG/DL (ref 1.6–2.4)
MCH RBC QN AUTO: 31.5 PG (ref 26.6–33)
MCHC RBC AUTO-ENTMCNC: 33.2 G/DL (ref 31.5–35.7)
MCV RBC AUTO: 94.7 FL (ref 79–97)
MONOCYTES # BLD AUTO: 0.94 10*3/MM3 (ref 0.1–0.9)
MONOCYTES NFR BLD AUTO: 7.9 % (ref 5–12)
NEUTROPHILS NFR BLD AUTO: 69.4 % (ref 42.7–76)
NEUTROPHILS NFR BLD AUTO: 8.25 10*3/MM3 (ref 1.7–7)
NRBC BLD AUTO-RTO: 0 /100 WBC (ref 0–0.2)
PHOSPHATE SERPL-MCNC: 2.6 MG/DL (ref 2.5–4.5)
PLAT MORPH BLD: NORMAL
PLATELET # BLD AUTO: 275 10*3/MM3 (ref 140–450)
PMV BLD AUTO: 10.4 FL (ref 6–12)
POTASSIUM SERPL-SCNC: 4 MMOL/L (ref 3.5–5.2)
PROT SERPL-MCNC: 6.1 G/DL (ref 6–8.5)
RBC # BLD AUTO: 3.59 10*6/MM3 (ref 4.14–5.8)
RBC MORPH BLD: NORMAL
SODIUM SERPL-SCNC: 135 MMOL/L (ref 136–145)
URATE SERPL-MCNC: 3 MG/DL (ref 3.4–7)
WBC MORPH BLD: NORMAL
WBC NRBC COR # BLD: 11.89 10*3/MM3 (ref 3.4–10.8)

## 2023-04-25 PROCEDURE — 94799 UNLISTED PULMONARY SVC/PX: CPT

## 2023-04-25 PROCEDURE — 99233 SBSQ HOSP IP/OBS HIGH 50: CPT | Performed by: STUDENT IN AN ORGANIZED HEALTH CARE EDUCATION/TRAINING PROGRAM

## 2023-04-25 PROCEDURE — 99232 SBSQ HOSP IP/OBS MODERATE 35: CPT | Performed by: STUDENT IN AN ORGANIZED HEALTH CARE EDUCATION/TRAINING PROGRAM

## 2023-04-25 PROCEDURE — 84550 ASSAY OF BLOOD/URIC ACID: CPT | Performed by: FAMILY MEDICINE

## 2023-04-25 PROCEDURE — 94760 N-INVAS EAR/PLS OXIMETRY 1: CPT

## 2023-04-25 PROCEDURE — 25010000002 CEFTRIAXONE PER 250 MG: Performed by: INTERNAL MEDICINE

## 2023-04-25 PROCEDURE — 82962 GLUCOSE BLOOD TEST: CPT

## 2023-04-25 PROCEDURE — 80048 BASIC METABOLIC PNL TOTAL CA: CPT | Performed by: FAMILY MEDICINE

## 2023-04-25 PROCEDURE — 85007 BL SMEAR W/DIFF WBC COUNT: CPT | Performed by: FAMILY MEDICINE

## 2023-04-25 PROCEDURE — 80076 HEPATIC FUNCTION PANEL: CPT | Performed by: FAMILY MEDICINE

## 2023-04-25 PROCEDURE — 83735 ASSAY OF MAGNESIUM: CPT | Performed by: FAMILY MEDICINE

## 2023-04-25 PROCEDURE — 63710000001 INSULIN LISPRO (HUMAN) PER 5 UNITS: Performed by: INTERNAL MEDICINE

## 2023-04-25 PROCEDURE — 97110 THERAPEUTIC EXERCISES: CPT

## 2023-04-25 PROCEDURE — 25010000002 ENOXAPARIN PER 10 MG: Performed by: INTERNAL MEDICINE

## 2023-04-25 PROCEDURE — 94664 DEMO&/EVAL PT USE INHALER: CPT

## 2023-04-25 PROCEDURE — 25010000002 MAGNESIUM SULFATE 2 GM/50ML SOLUTION: Performed by: STUDENT IN AN ORGANIZED HEALTH CARE EDUCATION/TRAINING PROGRAM

## 2023-04-25 PROCEDURE — 63710000001 INSULIN DETEMIR PER 5 UNITS: Performed by: FAMILY MEDICINE

## 2023-04-25 PROCEDURE — 92526 ORAL FUNCTION THERAPY: CPT

## 2023-04-25 PROCEDURE — 71045 X-RAY EXAM CHEST 1 VIEW: CPT

## 2023-04-25 PROCEDURE — 84100 ASSAY OF PHOSPHORUS: CPT | Performed by: FAMILY MEDICINE

## 2023-04-25 PROCEDURE — 85025 COMPLETE CBC W/AUTO DIFF WBC: CPT | Performed by: FAMILY MEDICINE

## 2023-04-25 RX ORDER — MAGNESIUM SULFATE HEPTAHYDRATE 40 MG/ML
2 INJECTION, SOLUTION INTRAVENOUS ONCE
Status: COMPLETED | OUTPATIENT
Start: 2023-04-25 | End: 2023-04-25

## 2023-04-25 RX ADMIN — LEVALBUTEROL HYDROCHLORIDE 1.25 MG: 1.25 SOLUTION RESPIRATORY (INHALATION) at 07:10

## 2023-04-25 RX ADMIN — INSULIN LISPRO 4 UNITS: 100 INJECTION, SOLUTION INTRAVENOUS; SUBCUTANEOUS at 17:15

## 2023-04-25 RX ADMIN — CLOPIDOGREL BISULFATE 75 MG: 75 TABLET ORAL at 08:53

## 2023-04-25 RX ADMIN — INSULIN DETEMIR 20 UNITS: 100 INJECTION, SOLUTION SUBCUTANEOUS at 21:37

## 2023-04-25 RX ADMIN — BUDESONIDE 0.5 MG: 0.5 SUSPENSION RESPIRATORY (INHALATION) at 07:11

## 2023-04-25 RX ADMIN — TAMSULOSIN HYDROCHLORIDE 0.4 MG: 0.4 CAPSULE ORAL at 08:53

## 2023-04-25 RX ADMIN — INSULIN LISPRO 2 UNITS: 100 INJECTION, SOLUTION INTRAVENOUS; SUBCUTANEOUS at 12:25

## 2023-04-25 RX ADMIN — DILTIAZEM HYDROCHLORIDE 30 MG: 30 TABLET, FILM COATED ORAL at 12:24

## 2023-04-25 RX ADMIN — METOPROLOL SUCCINATE 50 MG: 50 TABLET, EXTENDED RELEASE ORAL at 08:53

## 2023-04-25 RX ADMIN — Medication 10 ML: at 08:54

## 2023-04-25 RX ADMIN — INSULIN LISPRO 2 UNITS: 100 INJECTION, SOLUTION INTRAVENOUS; SUBCUTANEOUS at 08:52

## 2023-04-25 RX ADMIN — DILTIAZEM HYDROCHLORIDE 30 MG: 30 TABLET, FILM COATED ORAL at 17:02

## 2023-04-25 RX ADMIN — SENNOSIDES AND DOCUSATE SODIUM 2 TABLET: 8.6; 5 TABLET ORAL at 21:37

## 2023-04-25 RX ADMIN — METHIMAZOLE 10 MG: 5 TABLET ORAL at 21:37

## 2023-04-25 RX ADMIN — Medication 10 ML: at 10:21

## 2023-04-25 RX ADMIN — DILTIAZEM HYDROCHLORIDE 30 MG: 30 TABLET, FILM COATED ORAL at 00:05

## 2023-04-25 RX ADMIN — Medication 10 ML: at 21:38

## 2023-04-25 RX ADMIN — SENNOSIDES AND DOCUSATE SODIUM 2 TABLET: 8.6; 5 TABLET ORAL at 08:52

## 2023-04-25 RX ADMIN — LEVALBUTEROL HYDROCHLORIDE 1.25 MG: 1.25 SOLUTION RESPIRATORY (INHALATION) at 13:46

## 2023-04-25 RX ADMIN — MONTELUKAST 10 MG: 10 TABLET, FILM COATED ORAL at 21:37

## 2023-04-25 RX ADMIN — Medication 10 ML: at 21:37

## 2023-04-25 RX ADMIN — Medication 4 ML: at 19:21

## 2023-04-25 RX ADMIN — CEFTRIAXONE SODIUM 1 G: 1 INJECTION, SOLUTION INTRAVENOUS at 12:20

## 2023-04-25 RX ADMIN — LEVALBUTEROL HYDROCHLORIDE 1.25 MG: 1.25 SOLUTION RESPIRATORY (INHALATION) at 19:22

## 2023-04-25 RX ADMIN — Medication 4 ML: at 07:10

## 2023-04-25 RX ADMIN — Medication 4 ML: at 02:25

## 2023-04-25 RX ADMIN — METHIMAZOLE 10 MG: 5 TABLET ORAL at 05:21

## 2023-04-25 RX ADMIN — METHIMAZOLE 10 MG: 5 TABLET ORAL at 17:02

## 2023-04-25 RX ADMIN — BUDESONIDE 0.5 MG: 0.5 SUSPENSION RESPIRATORY (INHALATION) at 19:21

## 2023-04-25 RX ADMIN — ARFORMOTEROL TARTRATE 15 MCG: 15 SOLUTION RESPIRATORY (INHALATION) at 19:22

## 2023-04-25 RX ADMIN — ARFORMOTEROL TARTRATE 15 MCG: 15 SOLUTION RESPIRATORY (INHALATION) at 07:11

## 2023-04-25 RX ADMIN — MAGNESIUM SULFATE HEPTAHYDRATE 2 G: 2 INJECTION, SOLUTION INTRAVENOUS at 10:21

## 2023-04-25 RX ADMIN — DILTIAZEM HYDROCHLORIDE 30 MG: 30 TABLET, FILM COATED ORAL at 05:21

## 2023-04-25 RX ADMIN — LEVALBUTEROL HYDROCHLORIDE 1.25 MG: 1.25 SOLUTION RESPIRATORY (INHALATION) at 02:25

## 2023-04-25 RX ADMIN — ENOXAPARIN SODIUM 40 MG: 100 INJECTION SUBCUTANEOUS at 12:24

## 2023-04-25 RX ADMIN — INSULIN DETEMIR 20 UNITS: 100 INJECTION, SOLUTION SUBCUTANEOUS at 08:53

## 2023-04-25 RX ADMIN — METOPROLOL SUCCINATE 50 MG: 50 TABLET, EXTENDED RELEASE ORAL at 21:37

## 2023-04-25 NOTE — PROGRESS NOTES
Pulmonary / Critical Care Progress Note      Patient Name: Cristi Maloney  : 1940  MRN: [id number]  Primary Care Physician:  Gonzalo Nugent MD  Date of admission: 2023    Subjective   Subjective   Follow-up for multifocal pneumonia, acute epoxy respiratory failure, COPD    No acute events overnight.    This morning,  Lying in bed awake on 3 L nasal cannula  Family at bedside  Reports feeling better  Breathing improved  Congested cough with small amount of thick sputum  No fever/chills  No chest pain    Review of Systems  General: Fatigue, otherwise denied complaints  HEENT: Denied complaints  Respiratory: Dyspnea, cough, otherwise denied complaints  Cardiovascular: Denied complaints  GI: Denied complaints  : Denied complaints  MSK: Denied complaints    Objective   Objective     Vitals:   Temp:  [98.1 °F (36.7 °C)-98.4 °F (36.9 °C)] 98.1 °F (36.7 °C)  Heart Rate:  [70-97] 87  Resp:  [12-20] 18  BP: (116-132)/(54-65) 116/65  Flow (L/min):  [3-5] 3     Physical Exam   Vital Signs Reviewed   General:  Alert, NAD. Sitting up in bed   HEENT:  PERRL, EOMI.    Neck:  No JVD, no thyromegaly  Lymph: no axillary, cervical, supraclavicular lymphadenopathy noted bilaterally  Chest:  rhonchi noted on auscultation bilaterally R>L, no work of breathing noted on 2L NC  CV: RRR, no M/G/R, pulses 2+  Abd:  Soft, NT, ND, +BS  EXT:  no clubbing, no cyanosis, no edema  Neuro:  A&Ox3, CN grossly intact, no focal deficits.  Skin: No rashes or lesions noted    Result Review    Result Review:  I have personally reviewed the results from the time of this admission to 2023 12:31 EDT and agree with these findings:  [x]  Laboratory  [x]  Microbiology  [x]  Radiology  []  EKG/Telemetry   []  Cardiology/Vascular   []  Pathology  []  Old records  []  Other:  Most notable findings include:    Strep pneumo positive    Pro-David - 10.64 --> 1.88  proBNP - 2283     CT chest -no pulmonary emboli, extensive consolidation in  bilateral lower lobes, advanced emphysematous lung disease, left hilar lymphadenopathy with densely calcified nodes, small nodes present in right hilum, no mediastinal lymphadenopathy    4/25 CXR -favorable progression with improved aeration of left lung base        Lab 04/25/23  0532 04/24/23  0440 04/23/23  0236 04/22/23  0335 04/21/23 2022 04/21/23  1556 04/21/23  1229 04/21/23  1043 04/21/23  0356 04/20/23  2349 04/20/23  0321 04/19/23  2331 04/19/23  1114 04/19/23  1010 04/19/23  0633 04/19/23  0630   WBC 11.89* 12.07* 10.41 13.02*  --   --   --   --  14.29*  --   --  16.77*  --  16.05*  --  17.84*   HEMOGLOBIN 11.3* 11.3* 11.5* 10.8*  --   --   --   --  9.3*  --   --  10.6*  --  12.9*  --  14.2   HEMATOCRIT 34.0* 33.9* 34.4* 31.5*  --   --   --   --  27.2*  --   --  30.4*  --  38.4  --  42.7   PLATELETS 275 256 239 203  --   --   --   --  183  --   --  215  --  299  --  330   SODIUM 135* 135* 136 137 134* 135* 132*   < > 139 140   < > 144  144   < >  --   --  137   SODIUM, ARTERIAL  --   --   --   --   --   --   --   --   --   --   --   --   --   --    < >  --    POTASSIUM 4.0 3.7 4.0 4.4 4.3 4.6 4.2   < > 4.0 3.7   < > 4.6  4.6   < >  --   --  3.2*   CHLORIDE 100 99 100 103 103 105 105   < > 111* 113*   < > 118*  118*   < >  --   --  100   CO2 26.7 27.1 24.2 20.6* 18.1* 16.6* 17.7*   < > 18.0* 19.4*   < > 16.9*  16.9*   < >  --   --  13.4*   BUN 16 13 14 15 15 14 14   < > 15 16   < > 28*  28*   < >  --   --  39*   CREATININE 0.58* 0.56* 0.52* 0.53* 0.51* 0.55* 0.50*   < > 0.53* 0.60*   < > 0.82  0.82   < >  --   --  1.45*   GLUCOSE 189* 255* 187* 145* 166* 169* 122*   < > 145* 124*   < > 135*  135*   < >  --   --  606*   GLUCOSE, ARTERIAL  --   --   --   --   --   --   --   --   --   --   --   --   --   --    < >  --    CALCIUM 9.1 9.2 9.3 9.2 8.7 8.7 8.4*   < > 8.3* 8.5*   < > 8.4*  8.4*   < >  --   --  9.7   PHOSPHORUS 2.6 3.6 3.9 3.1 2.6 2.4* 2.2*   < > 2.3* 2.8   < > 2.6   < >  --   --  1.5*    URIC ACID 3.0*  --   --   --   --   --   --   --   --   --   --   --   --   --   --   --    TOTAL PROTEIN 6.1 5.9* 6.1 5.6*  --   --   --   --   --  4.8*  --  5.0*  --   --   --  6.9   ALBUMIN 2.8* 2.7* 2.7* 2.5*  --   --   --   --   --  2.1*  --  2.4*  --   --   --  3.3*   GLOBULIN  --   --   --   --   --   --   --   --   --   --   --  2.6  --   --   --  3.6    < > = values in this interval not displayed.       Assessment & Plan   Assessment / Plan     Active Hospital Problems:  Active Hospital Problems    Diagnosis    • **Sepsis      Impression:  Multifocal pneumonia concern due to pneumococcal pneumonia  Acute hypoxic respiratory failure  COPD with centrilobular emphysema  Anion gap metabolic acidosis  Acute kidney injury likely prerenal  Hypokalemia  Hypophosphatemia  Altered mental status likely metabolic encephalopathy from sepsis    Plan:  -Currently on 2L per nasal cannula, continue to wean to maintain SPO2 greater than 90%  -Continue ceftriaxone day 6/7 for pneumococcal pneumonia  -Completed 3-day course of azithromycin  -Repeat chest x-ray reviewed this morning showing favorable progression with improved aeration in left lung base  -Procalcitonin improving  -Continue Xopenex, Brovana and Pulmicort added  -Continue bronchopulmonary hygiene  -Continue MetaNebs and saline nebs  -Encourage I-S/flutter valve  -PT/OT on board, appreciate assistance  -Encourage mobilization, out of bed to chair  -We will need follow-up in COPD clinic after discharge  -RT  consulted to arrange for outpatient PFTs    DVT prophylaxis:  Medical and mechanical DVT prophylaxis orders are present.    CODE STATUS:   Level Of Support Discussed With: Patient  Code Status (Patient has no pulse and is not breathing): CPR (Attempt to Resuscitate)  Medical Interventions (Patient has pulse or is breathing): Full Support  Release to patient: Routine Release    This patient was seen by both a physician and a NP. Geneva CABRAL MD,  spent >50% of time in accordance with split shared billing. This included personally reviewing all pertinent labs, imaging, microbiology and documentation. Also discussing the case with the patient and any available family, the admitting physician and any available ancillary staff.     Electronically signed by Geneva Boles MD, 04/25/23, 1:38 PM EDT.

## 2023-04-25 NOTE — THERAPY TREATMENT NOTE
Acute Care - Physical Therapy Treatment Note   [facility]     Patient Name: Cristi Maloney  : 1940  MRN: [id number]  Today's Date: 2023   Onset of Illness/Injury or Date of Surgery: 23  Visit Dx:     ICD-10-CM ICD-9-CM   1. Atrial fibrillation with rapid ventricular response  I48.91 427.31   2. Septic shock  A41.9 038.9    R65.21 785.52     995.92   3. Pneumonia of both lower lobes due to infectious organism  J18.9 486   4. Hypoxia  R09.02 799.02   5. Diabetic ketoacidosis without coma associated with other specified diabetes mellitus  E13.10 250.12   6. Acute respiratory failure with hypoxia  J96.01 518.81   7. Dysphagia, oropharyngeal  R13.12 787.22   8. Difficulty in walking  R26.2 719.7   9. Decreased activities of daily living (ADL)  Z78.9 V49.89     Patient Active Problem List   Diagnosis   • Sepsis     Past Medical History:   Diagnosis Date   • CHF (congestive heart failure)    • Elevated cholesterol    • Hypertension      Past Surgical History:   Procedure Laterality Date   • JOINT REPLACEMENT       PT Assessment (last 12 hours)     PT Evaluation and Treatment     Row Name 23 1452          Physical Therapy Time and Intention    Subjective Information no complaints (P)   -JF     Document Type therapy note (daily note) (P)   -JF     Mode of Treatment individual therapy;physical therapy (P)   -JF     Patient Effort good (P)   -JF     Symptoms Noted During/After Treatment fatigue (P)   -JF     Row Name 23 1452          General Information    Patient Profile Reviewed yes (P)   -JF     Patient Observations alert;cooperative;agree to therapy (P)   -JF     Existing Precautions/Restrictions fall;shoulder;non-weight bearing;oxygen therapy device and L/min (P)   -JF     Barriers to Rehab none identified (P)   -JF     Row Name 23 0902          Mobility    Extremity Weight-bearing Status left upper extremity (P)   -JF     Left Upper Extremity (Weight-bearing Status) non weight-bearing  (NWB) (P)   Shoulder surgery 4/10/23  -[facility] Name 04/25/23 1452          Bed Mobility    Bed Mobility supine-sit (P)   -     Supine-Sit Kegley (Bed Mobility) moderate assist (50% patient effort);1 person assist (P)   -     Bed Mobility, Safety Issues decreased use of arms for pushing/pulling;decreased use of legs for bridging/pushing (P)   -     Assistive Device (Bed Mobility) draw sheet;head of bed elevated (P)   -[facility] Name 04/25/23 1452          Transfers    Transfers sit-stand transfer (P)   x2  -[facility] Name 04/25/23 1452          Sit-Stand Transfer    Sit-Stand Kegley (Transfers) moderate assist (50% patient effort);1 person assist (P)   -     Assistive Device (Sit-Stand Transfers) other (see comments) (P)   none, guarded patient from the front  -JF     Row Name 04/25/23 1452          Gait/Stairs (Locomotion)    Gait/Stairs Locomotion gait/ambulation independence;gait/ambulation assistive device (P)   -     Kegley Level (Gait) not tested (P)   Deferred since patient had increased pain and weakness in his lower extremities.  -[facility] Name 04/25/23 1452          Safety Issues, Functional Mobility    Impairments Affecting Function (Mobility) balance;endurance/activity tolerance;strength (P)   -JF     Row Name 04/25/23 1452          Balance    Balance Assessment standing static balance (P)   -     Static Standing Balance moderate assist;1-person assist (P)   -JF     Row Name 04/25/23 1452          Motor Skills    Therapeutic Exercise hip;knee;ankle (P)   -JF     Row Name 04/25/23 1452          Hip (Therapeutic Exercise)    Hip (Therapeutic Exercise) strengthening exercise (P)   -     Hip Strengthening (Therapeutic Exercise) bilateral;marching while seated;sitting;20 repititions (P)   -JF     Row Name 04/25/23 1452          Knee (Therapeutic Exercise)    Knee (Therapeutic Exercise) AROM (active range of motion) (P)   -     Knee AROM (Therapeutic Exercise)  bilateral;LAQ (long arc quad);sitting;10 repetitions (P)   -     Row Name 04/25/23 1452          Ankle (Therapeutic Exercise)    Ankle (Therapeutic Exercise) AROM (active range of motion) (P)   -     Ankle AROM (Therapeutic Exercise) bilateral;dorsiflexion;plantarflexion;sitting;20 repititions (P)   -     Row Name 04/25/23 1452          Progress Summary (PT)    Progress Toward Functional Goals (PT) progress toward functional goals is good (P)   -     Daily Progress Summary (PT) Patient performed two sit to stand transfers today with moderate assistance and tolerated therapeutic exercise sitting EOB. Educated the patient on exercises he can do in bed throughout the day. He will continue to benefit from physical therapy services. (P)   -           User Key  (r) = Recorded By, (t) = Taken By, (c) = Cosigned By    Initials Name Provider Type    Coy De Anda, PT Student PT Student                Physical Therapy Education     Title: PT OT SLP Therapies (Done)     Topic: Physical Therapy (Done)     Point: Mobility training (Done)     Learning Progress Summary           Patient Acceptance, E, VU by AV at 4/24/2023 0951    Comment: MAKAYLA PAYAN  shoulder precautions/ immobilizer    Acceptance, E,TB, VU by DW at 4/23/2023 1256   Significant Other Acceptance, E, VU by AV at 4/24/2023 0951    Comment: MAKAYLA PAYAN  shoulder precautions/ immobilizer                   Point: Home exercise program (Done)     Learning Progress Summary           Patient Acceptance, E, VU by AV at 4/24/2023 0951    Comment: MAKAYLA PAYAN  shoulder precautions/ immobilizer    Acceptance, E,TB, VU by DW at 4/23/2023 1256   Significant Other Acceptance, E, VU by AV at 4/24/2023 0951    Comment: MAKAYLA PAYAN  shoulder precautions/ immobilizer                   Point: Body mechanics (Done)     Learning Progress Summary           Patient Acceptance, E, VU by AV at 4/24/2023 0951    Comment: MAKAYLA PAYAN  shoulder precautions/ immobilizer    Acceptance, E,TB, VU by  DW at 4/23/2023 1256   Significant Other Acceptance, E, VU by AV at 4/24/2023 0951    Comment: NWB LUE  shoulder precautions/ immobilizer                   Point: Precautions (Done)     Learning Progress Summary           Patient Acceptance, E, VU by AV at 4/24/2023 0951    Comment: NWB LUE  shoulder precautions/ immobilizer    Acceptance, E,TB, VU by DW at 4/23/2023 1256   Significant Other Acceptance, E, VU by AV at 4/24/2023 0951    Comment: NWB LUE  shoulder precautions/ immobilizer                               User Key     Initials Effective Dates Name Provider Type Discipline    AV 06/16/21 -  Rajan Seymour, OT Occupational Therapist OT    ARI 04/25/21 -  Jose M Cutler, PT Physical Therapist PT              PT Recommendation and Plan     Progress Summary (PT)  Progress Toward Functional Goals (PT): (P) progress toward functional goals is good  Daily Progress Summary (PT): (P) Patient performed two sit to stand transfers today with moderate assistance and tolerated therapeutic exercise sitting EOB. Educated the patient on exercises he can do in bed throughout the day. He will continue to benefit from physical therapy services.   Outcome Measures     Row Name 04/25/23 1500 04/24/23 1500 04/23/23 1255       How much help from another person do you currently need...    Turning from your back to your side while in flat bed without using bedrails? 2 (P)   -JF 2  -DANGELO (r) JF (t) DANGELO (c) 2  -DW    Moving from lying on back to sitting on the side of a flat bed without bedrails? 2 (P)   -JF 2  -DANGELO (r) JF (t) DANGELO (c) 2  -DW    Moving to and from a bed to a chair (including a wheelchair)? 2 (P)   -JF 2  -DANGELO (r) JF (t) DANGELO (c) 2  -DW    Standing up from a chair using your arms (e.g., wheelchair, bedside chair)? 2 (P)   -JF 2  -DANGELO (r) JF (t) DANGELO (c) 2  -DW    Climbing 3-5 steps with a railing? 1 (P)   -JF 1  -DANGELO (r) JF (t) DANGELO (c) 1  -DW    To walk in hospital room? 2 (P)   -JF 2  -DANGELO (r) JF (t) DANGELO (c) 1  -DW    AM-PAC 6  Clicks Score (PT) 11 (P)   -JF 11  -DANGELO (r) JF (t) 10  -DW       Functional Assessment    Outcome Measure Options AM-PAC 6 Clicks Basic Mobility (PT) (P)   -JF AM-PAC 6 Clicks Basic Mobility (PT)  -DANGELO (r) JF (t) DANGELO (c) AM-PAC 6 Clicks Basic Mobility (PT)  -DW          User Key  (r) = Recorded By, (t) = Taken By, (c) = Cosigned By    Initials Name Provider Type    Lawrence Franco, PT Physical Therapist    Jose M Rojas, PT Physical Therapist    Coy De Anda, PT Student PT Student                 Time Calculation:    PT Charges     Row Name 04/25/23 1501             Time Calculation    PT Received On 04/25/23 (P)   -JF         Timed Charges    97188 - PT Therapeutic Exercise Minutes 15 (P)   -JF         Total Minutes    Timed Charges Total Minutes 15 (P)   -JF       Total Minutes 15 (P)   -JF            User Key  (r) = Recorded By, (t) = Taken By, (c) = Cosigned By    Initials Name Provider Type    Coy De Anda, PT Student PT Student              Therapy Charges for Today     Code Description Service Date Service Provider Modifiers Qty    38967716160 HC PT THER PROC EA 15 MIN 4/24/2023 Coy Flores, PT Student GP 1    57954320898 HC PT THER PROC EA 15 MIN 4/25/2023 Coy Flores, PT Student GP 1          PT G-Codes  Outcome Measure Options: (P) AM-PAC 6 Clicks Basic Mobility (PT)  AM-PAC 6 Clicks Score (PT): (P) 11  AM-PAC 6 Clicks Score (OT): 12    Coy Flores PT Student  4/25/2023

## 2023-04-25 NOTE — PROGRESS NOTES
[facility]   Hospitalist Progress Note  Date: 2023  Patient Name: Cristi Maloney  : 1940  MRN: [id number]  Date of admission: 2023      Subjective   Subjective     Chief complaint: Altered mental status, shortness of breath     Summary:  82-year-old male with history of diabetes, ex-smoker, with recent left shoulder surgery, was found down by his wife, short of air, tachycardic, tachypneic, found to be in DKA as well as A-fib with RVR, bilateral pneumonia, placed on BiPAP, hypotension, admitted to the ICU, critical care consulted, cardiology consulted, failed several rounds of cardioversion, concern for thyrotoxicosis, primary hypothyroidism, started on PTU with transition to methimazole.  Strep pneumo antigen positive, had to have increase in methimazole, adjustments in beta-blocker, calcium channel blocker to achieve adequate rate control, transferred out of ICU 2023, right ankle pain, x-ray ordered for evaluation     Interval follow-up: No events overnight.  Patient awaiting rehab placement.  Patient blood glucose more stable today.  Uric acid was 3.0.  Renal function was stable.  His white blood cell count continues to improve.  Replenish magnesium this morning, continue to monitor.    Review of systems:  All systems reviewed and negative except for generalized fatigue, generalized weakness, cough, shortness of breath with exertion.               Objective   Objective     Vitals:   Temp:  [98.1 °F (36.7 °C)-98.4 °F (36.9 °C)] 98.1 °F (36.7 °C)  Heart Rate:  [] 87  Resp:  [12-20] 18  BP: (116-132)/(54-65) 116/65  Flow (L/min):  [3-5] 3  Physical Exam:    Constitutional: Awake, laying in bed, appears weak              Eyes: Pupils equal, sclerae anicteric              HENT: NCAT, mucous membranes moist              Neck: Supple, full range of motion              Respiratory: Scattered rhonchi, on nasal cannula, no heave, no thrill   Gastrointestinal: Positive bowel sounds, soft,  nontender, nondistended              Musculoskeletal: left shoulder incision clean, no erythema              Psychiatric: Pleasant mood and affect               Neurologic: Responds to questioning, moves extremities, follows commands              Skin: No rashes    Result Review    Result Review:  I have personally reviewed the pertinent results from the past 24 hours to 4/25/2023 08:47 EDT and agree with these findings:  [x]  Laboratory   CBC        4/23/2023    02:36 4/24/2023    04:40 4/25/2023    05:32   CBC   WBC 10.41   12.07   11.89     RBC 3.67   3.58   3.59     Hemoglobin 11.5   11.3   11.3     Hematocrit 34.4   33.9   34.0     MCV 93.7   94.7   94.7     MCH 31.3   31.6   31.5     MCHC 33.4   33.3   33.2     RDW 14.3   13.8   13.8     Platelets 239   256   275       BMP        4/23/2023    02:36 4/24/2023    04:40 4/25/2023    05:32   BMP   BUN 14   13   16     Creatinine 0.52   0.56   0.58     Sodium 136   135   135     Potassium 4.0   3.7   4.0     Chloride 100   99   100     CO2 24.2   27.1   26.7     Calcium 9.3   9.2   9.1       LIVER FUNCTION TESTS:      Lab 04/25/23  0532 04/24/23  0440 04/23/23  0236 04/22/23  0335 04/20/23  2349 04/19/23  2331 04/19/23  0630   TOTAL PROTEIN 6.1 5.9* 6.1 5.6* 4.8* 5.0* 6.9   ALBUMIN 2.8* 2.7* 2.7* 2.5* 2.1* 2.4* 3.3*   GLOBULIN  --   --   --   --   --  2.6 3.6   ALT (SGPT) 21 21 23 22 14 10 11   AST (SGOT) 19 19 25 31 25 19 10   BILIRUBIN 0.6 0.6 0.8 0.9 0.8 0.7 1.3*   INDIRECT BILIRUBIN 0.4 0.4 0.6 0.6 0.5  --   --    BILIRUBIN DIRECT 0.2 0.2 0.2 0.3 0.3  --   --    ALK PHOS 78 78 76 67 52 46 68       [x]  Microbiology   Blood Culture   Date Value Ref Range Status   04/19/2023 No growth at 2 days  Preliminary   04/19/2023 No growth at 2 days  Preliminary     No results found for: BCIDPCR, CXREFLEX, CSFCX, CULTURETIS  No results found for: CULTURES, HSVCX, URCX  No results found for: EYECULTURE, GCCX, HSVCULTURE, LABHSV  No results found for: LEGIONELLA, MRSACX,  MUMPSCX, MYCOPLASCX  No results found for: NOCARDIACX, STOOLCX  No results found for: THROATCX, UNSTIMCULT, URINECX, CULTURE, VZVCULTUR  No results found for: VIRALCULTU, WOUNDCX    [x]  Radiology Adult Transthoracic Echo Complete W/ Cont if Necessary Per Protocol    Result Date: 4/20/2023  •  Technically difficult study. •  Left ventricular ejection fraction appears to be 51 - 55%.  No obvious regional wall motion abnormalities. •  Left ventricular diastolic function is consistent with (grade I) impaired relaxation and age. •  Mild aortic valve stenosis is present with max/mean pressure gradient 20/13 mmHg..     CT Chest With Contrast Diagnostic    Result Date: 4/19/2023  PROCEDURE: CT CHEST W CONTRAST DIAGNOSTIC  COMPARISON:  None INDICATIONS: shortness of breath  TECHNIQUE: After obtaining the patient's consent, CT images were obtained with non-ionic intravenous contrast material.   PROTOCOL:   Pulmonary embolism imaging protocol performed    RADIATION:   DLP: 525.7mGy*cm   Automated exposure control was utilized to minimize radiation dose. CONTRAST: 100cc Isovue 370 I.V.  FINDINGS:  There is advanced emphysematous lung disease.  There is airspace disease dependently in both lower lobes.  No nodules or masses are identified.  There is atherosclerotic disease in the aorta and coronary arteries.  There is no mediastinal lymphadenopathy.  There are enlarged left hilar nodes measuring 2.3 by 1.3 cm.  There are borderline enlarged right hilar nodes.  Densely calcified nodes are present in the left hilum.  No acute findings are present in the upper abdomen.  Degenerative changes are present in the dorsal spine without definite bone destruction.  There are postoperative changes of left shoulder arthroplasty.  The aorta is of normal caliber.  There is significant motion degradation in the lung bases.  There are no obvious filling defects within the main or proximal pulmonary arteries.        1. Significant motion  artifact is present in the bases.  No central pulmonary emboli are identified.  2. Extensive consolidation in both lower lobes raising the question of aspiration. 3. Advanced emphysematous lung disease 4. Extensive coronary artery atherosclerotic calcifications. 5. Left hilar lymphadenopathy with a 2.3 cm node.  There are densely calcified nodes in the left hilum as well.  Smaller nodes are present within the right hilum.  There is no mediastinal lymphadenopathy. 6. Postop changes of recent left shoulder arthroplasty.     Jordan Marrero MD       Electronically Signed and Approved By: Jordan Marrero MD on 2023 at 7:46             XR Chest 1 View    Result Date: 2023  PROCEDURE: XR CHEST 1 VW  COMPARISON: [facility], , CHEST AP/PA 1 VIEW, 2013, 16:00.  INDICATIONS: Dysrhythmia  FINDINGS:  Infiltrates are noted in the lung bases.  The cardiac silhouette and mediastinum are stable.  Aortic atherosclerosis is noted.  No acute osseous abnormalities are observed.        1. Bibasilar infiltrates are noted.  The findings may relate to bibasilar pneumonia.  Changes of CHF and pulmonary edema could also have this appearance.       ANAYA FELDMAN MD       Electronically Signed and Approved By: ANAYA FELDMAN MD on 2023 at 6:46             XR Shoulder 1 Vw LT    Result Date: 4/10/2023  REVIEWING YOUR TEST RESULTS IN [facility] IS NOT A SUBSTITUTE FOR DISCUSSING THOSE RESULTS WITH YOUR HEALTH CARE PROVIDER. PLEASE CONTACT YOUR PROVIDER [facility]IA [facility] TO DISCUSS ANY QUESTIONS OR CONCERNS YOU MAY HAVE REGARDING THESE TEST RESULTS.  RADIOLOGY REPORT FACILITY:  [facility] UNIT/AGE/GENDER: J.PERIOP  OP      AGE:82 Y          SEX:M PATIENT NAME/:  DAPHNE BUSBY E    1940 UNIT NUMBER:  [id number] ACCOUNT NUMBER:  [id number] ACCESSION NUMBER:  [id number] PORTABLE AP LEFT SHOULDER 1328 hours DATE: 04/10/2023 COMPARISON: 2023 INDICATION: Status post shoulder  joint replacement. IMPRESSION: Patient is status post reverse total left shoulder arthroplasty. Prosthesis is in expected position and alignment in this single view. Negative for periprosthetic fracture. Surgical drain in place. Dictated by: Marlo Winters M.D. Images and Report reviewed and interpreted by: Marlo Winters M.D. <PS><Electronically signed by: Marlo Winters M.D.> 04/10/2023 1349 D: 04/10/2023 1348 T: 04/10/2023 1348      [x]  EKG/Telemetry   []  Cardiology/Vascular   []  Pathology  [x]  Old records  []  Other:    Assessment & Plan   Assessment / Plan     Assessment:  Atrial fibrillation rapid ventricular response  Multifocal atrial tachycardia  Multifocal pneumonia with concern for pneumococcal infection  Acute hypoxemic respiratory failure  Diabetic ketoacidosis  JUNIOR  Anion gap metabolic acidosis  Thyrotoxicosis/hyperthyroidism  Hypophosphatemia  Hypokalemia  Acute metabolic encephalopathy multifactorial likely related to sepsis  COPD  Dyslipidemia  Chronic diastolic CHF   Aortic valve stenosis     Plan:  Labs and imaging reviewed  X-ray right ankle personally reviewed, no fractures or acute displacement noted  Continue telemetry monitoring while we titrate medications including Cardizem and metoprolol  Continue methimazole 10 mg 3 times daily  We will refer to endocrinology at discharge given history of thyrotoxicosis  Continue PT/OT  Continue to wean oxygen per tolerance  Continue with Levemir to 20 units twice daily, correctional insulin in place  Continue Plavix 75 mg daily  Cardiology recommendations appreciated  Discussed with critical care Dr. Boles, recommendations appreciated  Continue ceftriaxone 1 g daily to complete 7 days total  Repeat TSH and free T4 in a.m.  Tamsulosin 0.4 mg daily continued  CBC, CMP, magnesium, phosphorus reviewed, recheck in follow-up in a.m.  Full code  DVT prophylaxis with Lovenox  Clinical course dictate further management   Discussed with nurse at the  bedside      DVT prophylaxis:  Medical and mechanical DVT prophylaxis orders are present.    CODE STATUS:   Level Of Support Discussed With: Patient  Code Status (Patient has no pulse and is not breathing): CPR (Attempt to Resuscitate)  Medical Interventions (Patient has pulse or is breathing): Full Support  Release to patient: Routine Release      Electronically signed by Marin Chauhan DO, 04/25/23, 8:48 AM EDT.

## 2023-04-25 NOTE — THERAPY TREATMENT NOTE
Acute Care - Speech Language Pathology   Swallow Treatment Note JESSIE [facility]     Patient Name: Cristi Maloney  : 1940  MRN: [id number]  Today's Date: 2023  Onset of Illness/Injury or Date of Surgery: 23     Referring Physician: Daphne Washington      Admit Date: 2023    Visit Dx:     ICD-10-CM ICD-9-CM   1. Atrial fibrillation with rapid ventricular response  I48.91 427.31   2. Septic shock  A41.9 038.9    R65.21 785.52     995.92   3. Pneumonia of both lower lobes due to infectious organism  J18.9 486   4. Hypoxia  R09.02 799.02   5. Diabetic ketoacidosis without coma associated with other specified diabetes mellitus  E13.10 250.12   6. Acute respiratory failure with hypoxia  J96.01 518.81   7. Dysphagia, oropharyngeal  R13.12 787.22   8. Difficulty in walking  R26.2 719.7   9. Decreased activities of daily living (ADL)  Z78.9 V49.89     Patient Active Problem List   Diagnosis   • Sepsis     Past Medical History:   Diagnosis Date   • CHF (congestive heart failure)    • Elevated cholesterol    • Hypertension      Past Surgical History:   Procedure Laterality Date   • JOINT REPLACEMENT     SPEECH PATHOLOGY DYSPHAGIA TREATMENT    Subjective/Behavioral Observations: Patient seen for dysphagia tx.     Current Diet: Mechanical soft  Nectar thick liquids    Treatment received: Patient seen at bedside.  Patient seen at noon meal.  Patient tolerating mechanical soft consistencies well without clinical sign or symptom of aspiration.  Denies globus sensation after completion of swallow.  Therapeutic trials of thin liquids were completed.  No clinical signs or symptoms of aspiration noted.    Results of treatment: As stated    Progress toward goals: Progressing    Barriers to Achieving goals: Medical status    Plan of care:/changes in plan:   Upgrade diet to :   Mechanical soft diet- single sips of thin liquids  90 degrees upright  Slow rate, small bites/drinks            EDUCATION  The patient has been  educated in the following areas:   Dysphagia (Swallowing Impairment).       Savannah Major, SLP  4/25/2023

## 2023-04-26 ENCOUNTER — READMISSION MANAGEMENT (OUTPATIENT)
Dept: CALL CENTER | Facility: HOSPITAL | Age: 83
End: 2023-04-26
Payer: MEDICARE

## 2023-04-26 VITALS
BODY MASS INDEX: 26.7 KG/M2 | HEIGHT: 70 IN | OXYGEN SATURATION: 91 % | RESPIRATION RATE: 18 BRPM | TEMPERATURE: 98.6 F | SYSTOLIC BLOOD PRESSURE: 123 MMHG | WEIGHT: 186.51 LBS | DIASTOLIC BLOOD PRESSURE: 51 MMHG | HEART RATE: 90 BPM

## 2023-04-26 PROBLEM — I50.22 CHRONIC SYSTOLIC HEART FAILURE: Status: ACTIVE | Noted: 2023-04-26

## 2023-04-26 LAB
ALBUMIN SERPL-MCNC: 2.8 G/DL (ref 3.5–5.2)
ALBUMIN/GLOB SERPL: 1 G/DL
ALP SERPL-CCNC: 81 U/L (ref 39–117)
ALT SERPL W P-5'-P-CCNC: 19 U/L (ref 1–41)
ANION GAP SERPL CALCULATED.3IONS-SCNC: 6.5 MMOL/L (ref 5–15)
AST SERPL-CCNC: 18 U/L (ref 1–40)
BASOPHILS # BLD AUTO: 0.07 10*3/MM3 (ref 0–0.2)
BASOPHILS NFR BLD AUTO: 0.7 % (ref 0–1.5)
BILIRUB SERPL-MCNC: 0.6 MG/DL (ref 0–1.2)
BUN SERPL-MCNC: 16 MG/DL (ref 8–23)
BUN/CREAT SERPL: 30.8 (ref 7–25)
CALCIUM SPEC-SCNC: 8.8 MG/DL (ref 8.6–10.5)
CHLORIDE SERPL-SCNC: 101 MMOL/L (ref 98–107)
CO2 SERPL-SCNC: 28.5 MMOL/L (ref 22–29)
CREAT SERPL-MCNC: 0.52 MG/DL (ref 0.76–1.27)
DEPRECATED RDW RBC AUTO: 47.8 FL (ref 37–54)
EGFRCR SERPLBLD CKD-EPI 2021: 101.3 ML/MIN/1.73
EOSINOPHIL # BLD AUTO: 0.19 10*3/MM3 (ref 0–0.4)
EOSINOPHIL NFR BLD AUTO: 1.8 % (ref 0.3–6.2)
ERYTHROCYTE [DISTWIDTH] IN BLOOD BY AUTOMATED COUNT: 13.7 % (ref 12.3–15.4)
GLOBULIN UR ELPH-MCNC: 2.9 GM/DL
GLUCOSE BLDC GLUCOMTR-MCNC: 162 MG/DL (ref 70–99)
GLUCOSE BLDC GLUCOMTR-MCNC: 330 MG/DL (ref 70–99)
GLUCOSE SERPL-MCNC: 179 MG/DL (ref 65–99)
HCT VFR BLD AUTO: 33.3 % (ref 37.5–51)
HGB BLD-MCNC: 10.9 G/DL (ref 13–17.7)
IMM GRANULOCYTES # BLD AUTO: 0.66 10*3/MM3 (ref 0–0.05)
IMM GRANULOCYTES NFR BLD AUTO: 6.4 % (ref 0–0.5)
LYMPHOCYTES # BLD AUTO: 1.55 10*3/MM3 (ref 0.7–3.1)
LYMPHOCYTES NFR BLD AUTO: 15 % (ref 19.6–45.3)
MAGNESIUM SERPL-MCNC: 1.7 MG/DL (ref 1.6–2.4)
MCH RBC QN AUTO: 31.6 PG (ref 26.6–33)
MCHC RBC AUTO-ENTMCNC: 32.7 G/DL (ref 31.5–35.7)
MCV RBC AUTO: 96.5 FL (ref 79–97)
MONOCYTES # BLD AUTO: 0.78 10*3/MM3 (ref 0.1–0.9)
MONOCYTES NFR BLD AUTO: 7.6 % (ref 5–12)
NEUTROPHILS NFR BLD AUTO: 68.5 % (ref 42.7–76)
NEUTROPHILS NFR BLD AUTO: 7.08 10*3/MM3 (ref 1.7–7)
NRBC BLD AUTO-RTO: 0 /100 WBC (ref 0–0.2)
PHOSPHATE SERPL-MCNC: 2.8 MG/DL (ref 2.5–4.5)
PLATELET # BLD AUTO: 273 10*3/MM3 (ref 140–450)
PMV BLD AUTO: 10.2 FL (ref 6–12)
POTASSIUM SERPL-SCNC: 4 MMOL/L (ref 3.5–5.2)
PROT SERPL-MCNC: 5.7 G/DL (ref 6–8.5)
RBC # BLD AUTO: 3.45 10*6/MM3 (ref 4.14–5.8)
SODIUM SERPL-SCNC: 136 MMOL/L (ref 136–145)
T4 FREE SERPL-MCNC: 1.48 NG/DL (ref 0.93–1.7)
TSH SERPL DL<=0.05 MIU/L-ACNC: 0.18 UIU/ML (ref 0.27–4.2)
WBC NRBC COR # BLD: 10.33 10*3/MM3 (ref 3.4–10.8)

## 2023-04-26 PROCEDURE — 99232 SBSQ HOSP IP/OBS MODERATE 35: CPT | Performed by: STUDENT IN AN ORGANIZED HEALTH CARE EDUCATION/TRAINING PROGRAM

## 2023-04-26 PROCEDURE — 83735 ASSAY OF MAGNESIUM: CPT | Performed by: STUDENT IN AN ORGANIZED HEALTH CARE EDUCATION/TRAINING PROGRAM

## 2023-04-26 PROCEDURE — 25010000002 CEFTRIAXONE PER 250 MG: Performed by: INTERNAL MEDICINE

## 2023-04-26 PROCEDURE — 92526 ORAL FUNCTION THERAPY: CPT

## 2023-04-26 PROCEDURE — 82962 GLUCOSE BLOOD TEST: CPT

## 2023-04-26 PROCEDURE — 84439 ASSAY OF FREE THYROXINE: CPT | Performed by: STUDENT IN AN ORGANIZED HEALTH CARE EDUCATION/TRAINING PROGRAM

## 2023-04-26 PROCEDURE — 94799 UNLISTED PULMONARY SVC/PX: CPT

## 2023-04-26 PROCEDURE — 80053 COMPREHEN METABOLIC PANEL: CPT | Performed by: STUDENT IN AN ORGANIZED HEALTH CARE EDUCATION/TRAINING PROGRAM

## 2023-04-26 PROCEDURE — 85025 COMPLETE CBC W/AUTO DIFF WBC: CPT | Performed by: STUDENT IN AN ORGANIZED HEALTH CARE EDUCATION/TRAINING PROGRAM

## 2023-04-26 PROCEDURE — 94664 DEMO&/EVAL PT USE INHALER: CPT

## 2023-04-26 PROCEDURE — 99239 HOSP IP/OBS DSCHRG MGMT >30: CPT | Performed by: STUDENT IN AN ORGANIZED HEALTH CARE EDUCATION/TRAINING PROGRAM

## 2023-04-26 PROCEDURE — 63710000001 INSULIN DETEMIR PER 5 UNITS: Performed by: FAMILY MEDICINE

## 2023-04-26 PROCEDURE — 63710000001 INSULIN LISPRO (HUMAN) PER 5 UNITS: Performed by: INTERNAL MEDICINE

## 2023-04-26 PROCEDURE — 84443 ASSAY THYROID STIM HORMONE: CPT | Performed by: STUDENT IN AN ORGANIZED HEALTH CARE EDUCATION/TRAINING PROGRAM

## 2023-04-26 PROCEDURE — 84100 ASSAY OF PHOSPHORUS: CPT | Performed by: STUDENT IN AN ORGANIZED HEALTH CARE EDUCATION/TRAINING PROGRAM

## 2023-04-26 PROCEDURE — 25010000002 ENOXAPARIN PER 10 MG: Performed by: INTERNAL MEDICINE

## 2023-04-26 RX ORDER — BUDESONIDE AND FORMOTEROL FUMARATE DIHYDRATE 80; 4.5 UG/1; UG/1
2 AEROSOL RESPIRATORY (INHALATION)
Refills: 12
Start: 2023-04-26

## 2023-04-26 RX ORDER — METHIMAZOLE 10 MG/1
10 TABLET ORAL EVERY 8 HOURS SCHEDULED
Qty: 90 TABLET | Refills: 0 | Status: SHIPPED | OUTPATIENT
Start: 2023-04-26 | End: 2023-05-26

## 2023-04-26 RX ORDER — LISINOPRIL 2.5 MG/1
2.5 TABLET ORAL DAILY
Qty: 30 TABLET | Refills: 0 | Status: SHIPPED | OUTPATIENT
Start: 2023-04-26 | End: 2023-05-26

## 2023-04-26 RX ORDER — METOPROLOL SUCCINATE 50 MG/1
50 TABLET, EXTENDED RELEASE ORAL EVERY 12 HOURS SCHEDULED
Qty: 60 TABLET | Refills: 0 | Status: SHIPPED | OUTPATIENT
Start: 2023-04-26 | End: 2023-05-26

## 2023-04-26 RX ORDER — ALBUTEROL SULFATE 90 UG/1
2 AEROSOL, METERED RESPIRATORY (INHALATION) EVERY 4 HOURS PRN
Start: 2023-04-26

## 2023-04-26 RX ORDER — ATORVASTATIN CALCIUM 40 MG/1
40 TABLET, FILM COATED ORAL NIGHTLY
Qty: 30 TABLET | Refills: 0 | Status: SHIPPED | OUTPATIENT
Start: 2023-04-26 | End: 2023-05-26

## 2023-04-26 RX ADMIN — METOPROLOL SUCCINATE 50 MG: 50 TABLET, EXTENDED RELEASE ORAL at 08:19

## 2023-04-26 RX ADMIN — DILTIAZEM HYDROCHLORIDE 30 MG: 30 TABLET, FILM COATED ORAL at 06:23

## 2023-04-26 RX ADMIN — INSULIN LISPRO 2 UNITS: 100 INJECTION, SOLUTION INTRAVENOUS; SUBCUTANEOUS at 08:20

## 2023-04-26 RX ADMIN — INSULIN LISPRO 7 UNITS: 100 INJECTION, SOLUTION INTRAVENOUS; SUBCUTANEOUS at 12:21

## 2023-04-26 RX ADMIN — Medication 4 ML: at 06:57

## 2023-04-26 RX ADMIN — DILTIAZEM HYDROCHLORIDE 30 MG: 30 TABLET, FILM COATED ORAL at 12:22

## 2023-04-26 RX ADMIN — METHIMAZOLE 10 MG: 5 TABLET ORAL at 06:23

## 2023-04-26 RX ADMIN — TAMSULOSIN HYDROCHLORIDE 0.4 MG: 0.4 CAPSULE ORAL at 08:19

## 2023-04-26 RX ADMIN — LEVALBUTEROL HYDROCHLORIDE 1.25 MG: 1.25 SOLUTION RESPIRATORY (INHALATION) at 00:07

## 2023-04-26 RX ADMIN — ARFORMOTEROL TARTRATE 15 MCG: 15 SOLUTION RESPIRATORY (INHALATION) at 06:57

## 2023-04-26 RX ADMIN — INSULIN DETEMIR 20 UNITS: 100 INJECTION, SOLUTION SUBCUTANEOUS at 08:20

## 2023-04-26 RX ADMIN — CLOPIDOGREL BISULFATE 75 MG: 75 TABLET ORAL at 08:19

## 2023-04-26 RX ADMIN — CEFTRIAXONE SODIUM 1 G: 1 INJECTION, SOLUTION INTRAVENOUS at 10:39

## 2023-04-26 RX ADMIN — ENOXAPARIN SODIUM 40 MG: 100 INJECTION SUBCUTANEOUS at 10:39

## 2023-04-26 RX ADMIN — SENNOSIDES AND DOCUSATE SODIUM 2 TABLET: 8.6; 5 TABLET ORAL at 08:19

## 2023-04-26 RX ADMIN — METHIMAZOLE 10 MG: 5 TABLET ORAL at 14:28

## 2023-04-26 RX ADMIN — Medication 10 ML: at 09:40

## 2023-04-26 RX ADMIN — LEVALBUTEROL HYDROCHLORIDE 1.25 MG: 1.25 SOLUTION RESPIRATORY (INHALATION) at 06:57

## 2023-04-26 RX ADMIN — BUDESONIDE 0.5 MG: 0.5 SUSPENSION RESPIRATORY (INHALATION) at 06:57

## 2023-04-26 RX ADMIN — Medication 10 ML: at 09:41

## 2023-04-26 RX ADMIN — DILTIAZEM HYDROCHLORIDE 30 MG: 30 TABLET, FILM COATED ORAL at 01:00

## 2023-04-26 NOTE — SIGNIFICANT NOTE
04/26/23 0947   Plan   Plan [facility] has been approved at [facility]. SW discussed with MD. Patient will DC to the facility today.   Final Discharge Disposition Code 03 - skilled nursing facility (SNF)

## 2023-04-26 NOTE — THERAPY TREATMENT NOTE
Acute Care - Speech Language Pathology   Swallow Treatment Note JESSIE Pickett     Patient Name: Cristi Maloney  : 1940  MRN: [id number]  Today's Date: 2023  Onset of Illness/Injury or Date of Surgery: 23     Referring Physician: Daphne Washington      Admit Date: 2023    Visit Dx:     ICD-10-CM ICD-9-CM   1. Atrial fibrillation with rapid ventricular response  I48.91 427.31   2. Septic shock  A41.9 038.9    R65.21 785.52     995.92   3. Pneumonia of both lower lobes due to infectious organism  J18.9 486   4. Hypoxia  R09.02 799.02   5. Diabetic ketoacidosis without coma associated with other specified diabetes mellitus  E13.10 250.12   6. Acute respiratory failure with hypoxia  J96.01 518.81   7. Dysphagia, oropharyngeal  R13.12 787.22   8. Difficulty in walking  R26.2 719.7   9. Decreased activities of daily living (ADL)  Z78.9 V49.89     Patient Active Problem List   Diagnosis   • Sepsis   • Chronic systolic heart failure (CMS/HCC)     Past Medical History:   Diagnosis Date   • CHF (congestive heart failure)    • Elevated cholesterol    • Hypertension      Past Surgical History:   Procedure Laterality Date   • JOINT REPLACEMENT         SPEECH PATHOLOGY DYSPHAGIA TREATMENT    Subjective/Behavioral Observations: Patient seen for dysphagia tx.     Current Diet: Mechanical soft  Nectar thick liquids    Treatment received: Patient seen at bedside. Tolerates soft solid/whole foods without s/s of aspiration.  No oral residue.  Tolerating thin liquids well. No s/ s of aspiration.  Denies globus sensation after completion of swallow.  Good overall intake    Results of treatment: As stated    Progress toward goals: Progressing    Barriers to Achieving goals: Medical status    Plan of care:/changes in plan:     Mechanical soft diet- single sips of thin liquids  90 degrees upright  Slow rate, small bites/drinks        No further dysphagia tx indicated at this time.     EDUCATION  The patient has been educated  in the following areas:   Dysphagia (Swallowing Impairment).       Savannah Major, SLP  4/26/2023

## 2023-04-26 NOTE — DISCHARGE SUMMARY
[facility]         HOSPITALIST  DISCHARGE SUMMARY    Patient Name: Cristi Maloney  : 1940  MRN: [id number]    Date of Admission: 2023  Date of Discharge:  2023  Primary Care Physician: Gonzalo Nugent MD    Consults     Date and Time Order Name Status Description    2023  9:24 AM Inpatient Hospitalist Consult      2023  9:22 AM Pulmonology (on-call MD unless specified) Completed     2023  9:22 AM Cardiology (on-call MD unless specified)            Active and Resolved Hospital Problems:  Active Hospital Problems    Diagnosis POA   • **Sepsis [A41.9] Yes   • Chronic systolic heart failure (CMS/HCC) [I50.22] Unknown      Resolved Hospital Problems   No resolved problems to display.     Assessment:  Atrial fibrillation rapid ventricular response -resolved  Multifocal atrial tachycardia-resolved  Multifocal pneumonia with concern for pneumococcal infection-completed antibiotic course  Diabetic ketoacidosis-resolved  JUNIOR-resolved  Thyrotoxicosis/hyperthyroidism-improving  COPD with centrilobular emphysema  Chronic diastolic CHF   Aortic valve stenosis  Dyslipidemia      Hospital Course     Hospital Course:  Cristi Maloney is a 82 y.o. male with history of diabetes, ex-smoker, with recent left shoulder surgery, was found down by his wife, short of air, tachycardic, tachypneic, found to be in DKA as well as A-fib with RVR, bilateral pneumonia, placed on BiPAP, hypotension, admitted to the ICU, critical care consulted, cardiology consulted, failed several rounds of cardioversion, concern for thyrotoxicosis, primary hypothyroidism, started on PTU with transition to methimazole, currently on methimazole.  Patient is currently on sinus rhythm, rate controlled on diltiazem, metoprolol succinate XL.  T4 within the normal limits, TSH below the normal limits. patient has been treated for multifocal pneumonia, completed antibiotic course with ceftriaxone and azithromycin.  Patient  has been evaluated by pulmonology and recommended to follow-up in 2 weeks as an outpatient and will be scheduled for outpatient PFTs.  Started on Symbicort scheduled, albuterol as needed inhaler therapy while awaiting to be followed up with outpatient pulmonology.  Patient is currently hemodynamically stable and has been discharged to the SNF/inpatient rehab facility.    DISCHARGE Follow Up Recommendations for labs and diagnostics:   Follow-up with endocrinology as an outpatient in 2 weeks  Follow-up with pulmonology outpatient in 2 weeks  Follow-up with PCP within 1 week  Continue medications as prescribed  Started on atorvastatin, diltiazem, lisinopril, methimazole, metoprolol succinate XL Symbicort inhaler    Day of Discharge     Vital Signs:  Temp:  [97.7 °F (36.5 °C)-98.6 °F (37 °C)] 98 °F (36.7 °C)  Heart Rate:  [82-92] 92  Resp:  [18-20] 18  BP: (130-138)/(52-58) 130/53  Flow (L/min):  [1-2] 1  Physical Exam:                    Constitutional: Awake, alert, not in distress              Eyes: Pupils equal, sclerae anicteric              HENT: NCAT, mucous membranes moist              Neck: Supple, full range of motion              Cardiac: Regular, normal rate, no murmurs rubs or gallops              Respiratory: Bilateral air entry present, mild scattered rhonchi, no heave, no thrill              Gastrointestinal: Positive bowel sounds, soft, nontender, nondistended              Musculoskeletal: left shoulder incision clean, no erythema              Psychiatric: Pleasant mood and affect               Neurologic: Responds to questioning, moves extremities, follows commands              Skin: No rashes                   Discharge Details        Discharge Medications      New Medications      Instructions Start Date   albuterol sulfate  (90 Base) MCG/ACT inhaler  Commonly known as: PROVENTIL HFA;VENTOLIN HFA;PROAIR HFA   2 puffs, Inhalation, Every 4 Hours PRN      atorvastatin 40 MG tablet  Commonly known  as: Lipitor   40 mg, Oral, Nightly      budesonide-formoterol 80-4.5 MCG/ACT inhaler  Commonly known as: Symbicort   2 puffs, Inhalation, 2 Times Daily - RT      dilTIAZem 30 MG tablet  Commonly known as: CARDIZEM   30 mg, Oral, Every 6 Hours Scheduled      lisinopril 2.5 MG tablet  Commonly known as: Zestril   2.5 mg, Oral, Daily      methIMAzole 10 MG tablet  Commonly known as: TAPAZOLE   10 mg, Oral, Every 8 Hours Scheduled      metoprolol succinate XL 50 MG 24 hr tablet  Commonly known as: TOPROL-XL   50 mg, Oral, Every 12 Hours Scheduled         Continue These Medications      Instructions Start Date   ascorbic acid 250 MG tablet  Commonly known as: VITAMIN C   250 mg, Oral, Daily      clopidogrel 75 MG tablet  Commonly known as: PLAVIX   75 mg, Oral, Daily      glipizide 10 MG tablet  Commonly known as: GLUCOTROL   10 mg, Oral, 2 Times Daily      metFORMIN 500 MG tablet  Commonly known as: GLUCOPHAGE   500 mg, Oral, 5 Times Daily      montelukast 10 MG tablet  Commonly known as: SINGULAIR   10 mg, Oral, Daily      multivitamin-minerals tablet tablet   1 tablet, Oral, Daily      ondansetron 4 MG tablet  Commonly known as: ZOFRAN   4 mg, Oral, Every 4 Hours PRN      oxyCODONE-acetaminophen 5-325 MG per tablet  Commonly known as: PERCOCET   1 tablet, Oral, Every 4 Hours PRN      pioglitazone 30 MG tablet  Commonly known as: ACTOS   30 mg, Oral, Daily      tamsulosin 0.4 MG capsule 24 hr capsule  Commonly known as: FLOMAX   0.4 mg, Oral, Daily      tiZANidine 2 MG tablet  Commonly known as: ZANAFLEX   2 mg, Oral, Every 6 Hours PRN         Stop These Medications    atenolol 25 MG tablet  Commonly known as: TENORMIN            No Known Allergies    Discharge Disposition:  Home or Self Care    Diet:  Hospital:  Diet Order   Procedures   • Diet: Diabetic Diets; Consistent Carbohydrate; Texture: Soft to Chew (NDD 3); Soft to Chew: Whole Meat; Fluid Consistency: Thin (IDDSI 0)       Discharge Activity:       CODE  STATUS:  Code Status and Medical Interventions:   Ordered at: 04/20/23 0752     Level Of Support Discussed With:    Patient     Code Status (Patient has no pulse and is not breathing):    CPR (Attempt to Resuscitate)     Medical Interventions (Patient has pulse or is breathing):    Full Support     Release to patient:    Routine Release         No future appointments.    Additional Instructions for the Follow-ups that You Need to Schedule     Discharge Follow-up with PCP   As directed       Currently Documented PCP:    Gonzalo Nugent MD    PCP Phone Number:    [phone]     Follow Up Details: 1 wk         Discharge Follow-up with Specified Provider: Patient needs follow up with endocrinology for hyperthyroidism.   As directed      To: Patient needs follow up with endocrinology for hyperthyroidism.         Discharge Follow-up with Specified Provider: Pulmonary as recommended.   As directed      To: Pulmonary as recommended.               Pertinent  and/or Most Recent Results     PROCEDURES:       LAB RESULTS:      Lab 04/26/23  0438 04/25/23  0532 04/24/23  0440 04/23/23  0236 04/22/23  0335 04/19/23  2331 04/19/23  1610   WBC 10.33 11.89* 12.07* 10.41 13.02*   < >  --    HEMOGLOBIN 10.9* 11.3* 11.3* 11.5* 10.8*   < >  --    HEMATOCRIT 33.3* 34.0* 33.9* 34.4* 31.5*   < >  --    PLATELETS 273 275 256 239 203   < >  --    NEUTROS ABS 7.08* 8.25* 8.34* 7.45* 11.09*   < >  --    IMMATURE GRANS (ABS) 0.66* 0.78* 0.63* 0.66* 0.29*   < >  --    LYMPHS ABS 1.55 1.70 1.86 1.44 1.23   < >  --    MONOS ABS 0.78 0.94* 1.00* 0.74 0.36   < >  --    EOS ABS 0.19 0.16 0.10 0.06 0.02   < >  --    MCV 96.5 94.7 94.7 93.7 93.5   < >  --    PROCALCITONIN  --   --  1.88*  --   --   --   --    LACTATE  --   --   --   --   --   --  2.0    < > = values in this interval not displayed.         Lab 04/26/23  0438 04/25/23  0532 04/24/23  0440 04/23/23  0236 04/22/23  0335   SODIUM 136 135* 135* 136 137   POTASSIUM 4.0 4.0 3.7 4.0 4.4    CHLORIDE 101 100 99 100 103   CO2 28.5 26.7 27.1 24.2 20.6*   ANION GAP 6.5 8.3 8.9 11.8 13.4   BUN 16 16 13 14 15   CREATININE 0.52* 0.58* 0.56* 0.52* 0.53*   EGFR 101.3 98.0 99.0 101.3 100.7   GLUCOSE 179* 189* 255* 187* 145*   CALCIUM 8.8 9.1 9.2 9.3 9.2   MAGNESIUM 1.7 1.6 1.7 1.8 1.9   PHOSPHORUS 2.8 2.6 3.6 3.9 3.1   TSH 0.181*  --   --  0.058* 0.027*         Lab 04/26/23  0438 04/25/23  0532 04/24/23  0440 04/23/23  0236 04/22/23  0335 04/20/23  2349 04/19/23  2331   TOTAL PROTEIN 5.7* 6.1 5.9* 6.1 5.6* 4.8* 5.0*   ALBUMIN 2.8* 2.8* 2.7* 2.7* 2.5* 2.1* 2.4*   GLOBULIN 2.9  --   --   --   --   --  2.6   ALT (SGPT) 19 21 21 23 22 14 10   AST (SGOT) 18 19 19 25 31 25 19   BILIRUBIN 0.6 0.6 0.6 0.8 0.9 0.8 0.7   INDIRECT BILIRUBIN  --  0.4 0.4 0.6 0.6 0.5  --    BILIRUBIN DIRECT  --  0.2 0.2 0.2 0.3 0.3  --    ALK PHOS 81 78 78 76 67 52 46                     Brief Urine Lab Results  (Last result in the past 365 days)      Color   Clarity   Blood   Leuk Est   Nitrite   Protein   CREAT   Urine HCG        04/19/23 1258 Yellow   Cloudy   Small (1+)   Negative   Negative   30 mg/dL (1+)               Microbiology Results (last 10 days)     Procedure Component Value - Date/Time    MRSA Screen, PCR (Inpatient) - Swab, Nares [[id number]]  (Normal) Collected: 04/19/23 1440    Lab Status: Final result Specimen: Swab from Nares Updated: 04/19/23 1605     MRSA PCR No MRSA Detected    Narrative:      The negative predictive value of this diagnostic test is high and should only be used to consider de-escalating anti-MRSA therapy. A positive result may indicate colonization with MRSA and must be correlated clinically.    S. Pneumo Ag Urine or CSF - Urine, Urine, Clean Catch [[id number]]  (Abnormal) Collected: 04/19/23 1300    Lab Status: Final result Specimen: Urine, Clean Catch Updated: 04/19/23 1351     Strep Pneumo Ag Positive    Legionella Antigen, Urine - Urine, Urine, Clean Catch [[id number]]  (Normal) Collected: 04/19/23  1300    Lab Status: Final result Specimen: Urine, Clean Catch Updated: 04/19/23 1350     LEGIONELLA ANTIGEN, URINE Negative    Blood Culture - Blood, Arm, Right [[id number]]  (Normal) Collected: 04/19/23 0630    Lab Status: Final result Specimen: Blood from Arm, Right Updated: 04/24/23 0645     Blood Culture No growth at 5 days    Blood Culture - Blood, Arm, Left [[id number]]  (Normal) Collected: 04/19/23 0630    Lab Status: Final result Specimen: Blood from Arm, Left Updated: 04/24/23 0645     Blood Culture No growth at 5 days          XR Ankle 3+ View Right    Result Date: 4/24/2023  Impression:   Right ankle series demonstrating no fractures.  Degenerative changes consistent with osteoarthritis.  Deformity of the anterior process of the calcaneus suggests significant prior injury.      SREEDHAR VALDEZ MD       Electronically Signed and Approved By: SREEDHAR VALDEZ MD on 4/24/2023 at 19:02             CT Chest With Contrast Diagnostic    Result Date: 4/19/2023  Impression:   1. Significant motion artifact is present in the bases.  No central pulmonary emboli are identified.  2. Extensive consolidation in both lower lobes raising the question of aspiration. 3. Advanced emphysematous lung disease 4. Extensive coronary artery atherosclerotic calcifications. 5. Left hilar lymphadenopathy with a 2.3 cm node.  There are densely calcified nodes in the left hilum as well.  Smaller nodes are present within the right hilum.  There is no mediastinal lymphadenopathy. 6. Postop changes of recent left shoulder arthroplasty.     Jordan Marrero MD       Electronically Signed and Approved By: Jordan Marrero MD on 4/19/2023 at 7:46             XR Chest 1 View    Result Date: 4/25/2023  Impression:  Favorable progression is suggested radiographically with improved aeration of the left lung base since 4/22/2023 at 0859 hours.      Please note that portions of this note were completed with a voice recognition program.  EDMUND MELENDEZ JR  MD       Electronically Signed and Approved By: EDMUND MELENDEZ JR, MD on 4/25/2023 at 6:15              XR Chest 1 View    Result Date: 4/19/2023  Impression:   1. Bibasilar infiltrates are noted.  The findings may relate to bibasilar pneumonia.  Changes of CHF and pulmonary edema could also have this appearance.       ANAYA FELDMAN MD       Electronically Signed and Approved By: ANAYA FELDMAN MD on 4/19/2023 at 6:46                       Results for orders placed during the hospital encounter of 04/19/23    Adult Transthoracic Echo Complete W/ Cont if Necessary Per Protocol    Interpretation Summary  •  Technically difficult study.  •  Left ventricular ejection fraction appears to be 51 - 55%.  No obvious regional wall motion abnormalities.  •  Left ventricular diastolic function is consistent with (grade I) impaired relaxation and age.  •  Mild aortic valve stenosis is present with max/mean pressure gradient 20/13 mmHg..      Labs Pending at Discharge:        Time spent on Discharge including face to face service:  35 minutes    Electronically signed by Leny Roberts MD, 04/26/23, 1:09 PM EDT.

## 2023-04-26 NOTE — CONSULTS
"Nutrition Services    Patient Name: Cristi Maloney  YOB: 1940  MRN: [id number]  Admission date: 4/19/2023      CLINICAL NUTRITION ASSESSMENT      Reason for Assessment  LOS   H&P:    Past Medical History:   Diagnosis Date   • CHF (congestive heart failure)    • Elevated cholesterol    • Hypertension         Current Problems:   Active Hospital Problems    Diagnosis    • **Sepsis         Nutrition/Diet History         Narrative     Pt followed by RD for LOS x 7 days. Over the past 5 months, pt w/ 3.1% decrease in wt. Per nursing documentation, pt w/ 100% meal and snack intake. Pt is at low risk per nutrition risk screening. No acute nutrition concerns or interventions at this time. RD will continue to follow and monitor per protocol.     Anthropometrics        Current Height, Weight Height: 177.8 cm (70\")  Weight: 84.6 kg (186 lb 8.2 oz)   Current BMI Body mass index is 26.76 kg/m².       Weight Hx  Wt Readings from Last 30 Encounters:   04/26/23 0526 84.6 kg (186 lb 8.2 oz)   04/25/23 0556 82.7 kg (182 lb 5.1 oz)   04/24/23 0314 82.9 kg (182 lb 12.2 oz)   04/23/23 1127 80.8 kg (178 lb 2.1 oz)   04/23/23 0500 84.4 kg (186 lb 1.1 oz)   04/22/23 0600 86.3 kg (190 lb 4.1 oz)   04/19/23 0640 80.4 kg (177 lb 4 oz)   11/07/22 1504 87.1 kg (192 lb 0.3 oz)            Wt Change Observation 3.1% decrease x 5 months     Estimated/Assessed Needs       Energy Requirements 25-30 kcal/kg adj BW   EST Needs (kcal/day) 5512-8176 kcal       Protein Requirements 0.8-1.0 g/kg adj BW   EST Daily Needs (g/day) 61-76 g       Fluid Requirements 1 ml/kcal    Estimated Needs (mL/day) 0569-4676 ml     Labs/Medications         Pertinent Labs Reviewed.   Results from last 7 days   Lab Units 04/26/23  0438 04/25/23  0532 04/24/23  0440   SODIUM mmol/L 136 135* 135*   POTASSIUM mmol/L 4.0 4.0 3.7   CHLORIDE mmol/L 101 100 99   CO2 mmol/L 28.5 26.7 27.1   BUN mg/dL 16 16 13   CREATININE mg/dL 0.52* 0.58* 0.56*   CALCIUM mg/dL 8.8 9.1 " 9.2   BILIRUBIN mg/dL 0.6 0.6 0.6   ALK PHOS U/L 81 78 78   ALT (SGPT) U/L 19 21 21   AST (SGOT) U/L 18 19 19   GLUCOSE mg/dL 179* 189* 255*     Results from last 7 days   Lab Units 04/26/23  0438 04/25/23  0532 04/24/23  0440   MAGNESIUM mg/dL 1.7 1.6 1.7   PHOSPHORUS mg/dL 2.8 2.6 3.6   HEMOGLOBIN g/dL 10.9* 11.3* 11.3*   HEMATOCRIT % 33.3* 34.0* 33.9*     No results found for: COVID19  Lab Results   Component Value Date    HGBA1C 8.90 (H) 04/19/2023         Pertinent Medications Reviewed.     Current Nutrition Orders & Evaluation of Intake       Oral Nutrition     Current PO Diet Diet: Diabetic Diets; Consistent Carbohydrate; Texture: Mechanical Ground (NDD 2); Fluid Consistency: Thin (IDDSI 0)   Supplement Orders Placed This Encounter      Dietary Nutrition Supplements Novasource Renal (Nepro)      Dietary Nutrition Supplements Ensure Pre-Surgery; strawberry       Malnutrition Severity Assessment                Nutrition Diagnosis         Nutrition Dx Problem 1 No nutrition diagnosis at this time.     Nutrition Intervention         No intervention indicated.      Medical Nutrition Therapy/Nutrition Education          Learner     Readiness N/A  N/A     Method     Response N/A  N/A     Monitor/Evaluation        Monitor Per protocol.     Nutrition Discharge Plan         No nutrition needs identified at this time.     Electronically signed by:  Guillermina Graves RD  04/26/23 08:36 EDT

## 2023-04-26 NOTE — PROGRESS NOTES
[facility]   Hospitalist Progress Note  Date: 2023  Patient Name: Cristi Maloney  : 1940  MRN: [id number]  Date of admission: 2023      Subjective   Subjective     Chief complaint: Altered mental status, shortness of breath     Summary:  82-year-old male with history of diabetes, ex-smoker, with recent left shoulder surgery, was found down by his wife, short of air, tachycardic, tachypneic, found to be in DKA as well as A-fib with RVR, bilateral pneumonia, placed on BiPAP, hypotension, admitted to the ICU, critical care consulted, cardiology consulted, failed several rounds of cardioversion, concern for thyrotoxicosis, primary hypothyroidism, started on PTU with transition to methimazole.  Strep pneumo antigen positive, had to have increase in methimazole, adjustments in beta-blocker, calcium channel blocker to achieve adequate rate control, transferred out of ICU 2023, right ankle pain, x-ray ordered for evaluation     Interval follow-up:    Review of systems:  All systems reviewed and negative except for generalized fatigue, generalized weakness, cough, shortness of breath with exertion.               Objective   Objective     Vitals:   Temp:  [97.7 °F (36.5 °C)-98.6 °F (37 °C)] 98.2 °F (36.8 °C)  Heart Rate:  [82-96] 87  Resp:  [18-20] 18  BP: (119-138)/(52-58) 131/52  Flow (L/min):  [1-2] 1  Physical Exam:    Constitutional: Awake, laying in bed, appears weak              Eyes: Pupils equal, sclerae anicteric              HENT: NCAT, mucous membranes moist              Neck: Supple, full range of motion              Respiratory: Scattered rhonchi, on nasal cannula, no heave, no thrill   Gastrointestinal: Positive bowel sounds, soft, nontender, nondistended              Musculoskeletal: left shoulder incision clean, no erythema              Psychiatric: Pleasant mood and affect               Neurologic: Responds to questioning, moves extremities, follows commands              Skin: No  keisha    Result Review    Result Review:  I have personally reviewed the pertinent results from the past 24 hours to 4/26/2023 08:40 EDT and agree with these findings:  [x]  Laboratory   CBC        4/24/2023    04:40 4/25/2023    05:32 4/26/2023    04:38   CBC   WBC 12.07   11.89   10.33     RBC 3.58   3.59   3.45     Hemoglobin 11.3   11.3   10.9     Hematocrit 33.9   34.0   33.3     MCV 94.7   94.7   96.5     MCH 31.6   31.5   31.6     MCHC 33.3   33.2   32.7     RDW 13.8   13.8   13.7     Platelets 256   275   273       BMP        4/24/2023    04:40 4/25/2023    05:32 4/26/2023    04:38   BMP   BUN 13   16   16     Creatinine 0.56   0.58   0.52     Sodium 135   135   136     Potassium 3.7   4.0   4.0     Chloride 99   100   101     CO2 27.1   26.7   28.5     Calcium 9.2   9.1   8.8       LIVER FUNCTION TESTS:      Lab 04/26/23  0438 04/25/23  0532 04/24/23  0440 04/23/23  0236 04/22/23  0335 04/20/23  2349 04/19/23  2331   TOTAL PROTEIN 5.7* 6.1 5.9* 6.1 5.6* 4.8* 5.0*   ALBUMIN 2.8* 2.8* 2.7* 2.7* 2.5* 2.1* 2.4*   GLOBULIN 2.9  --   --   --   --   --  2.6   ALT (SGPT) 19 21 21 23 22 14 10   AST (SGOT) 18 19 19 25 31 25 19   BILIRUBIN 0.6 0.6 0.6 0.8 0.9 0.8 0.7   INDIRECT BILIRUBIN  --  0.4 0.4 0.6 0.6 0.5  --    BILIRUBIN DIRECT  --  0.2 0.2 0.2 0.3 0.3  --    ALK PHOS 81 78 78 76 67 52 46       [x]  Microbiology   Blood Culture   Date Value Ref Range Status   04/19/2023 No growth at 2 days  Preliminary   04/19/2023 No growth at 2 days  Preliminary     No results found for: BCIDPCR, CXREFLEX, CSFCX, CULTURETIS  No results found for: CULTURES, HSVCX, URCX  No results found for: EYECULTURE, GCCX, HSVCULTURE, LABHSV  No results found for: LEGIONELLA, MRSACX, MUMPSCX, MYCOPLASCX  No results found for: NOCARDIACX, STOOLCX  No results found for: THROATCX, UNSTIMCULT, URINECX, CULTURE, VZVCULTUR  No results found for: VIRALCULTU, WOUNDCX    [x]  Radiology Adult Transthoracic Echo Complete W/ Cont if Necessary Per  Protocol    Result Date: 4/20/2023  •  Technically difficult study. •  Left ventricular ejection fraction appears to be 51 - 55%.  No obvious regional wall motion abnormalities. •  Left ventricular diastolic function is consistent with (grade I) impaired relaxation and age. •  Mild aortic valve stenosis is present with max/mean pressure gradient 20/13 mmHg..     CT Chest With Contrast Diagnostic    Result Date: 4/19/2023  PROCEDURE: CT CHEST W CONTRAST DIAGNOSTIC  COMPARISON:  None INDICATIONS: shortness of breath  TECHNIQUE: After obtaining the patient's consent, CT images were obtained with non-ionic intravenous contrast material.   PROTOCOL:   Pulmonary embolism imaging protocol performed    RADIATION:   DLP: 525.7mGy*cm   Automated exposure control was utilized to minimize radiation dose. CONTRAST: 100cc Isovue 370 I.V.  FINDINGS:  There is advanced emphysematous lung disease.  There is airspace disease dependently in both lower lobes.  No nodules or masses are identified.  There is atherosclerotic disease in the aorta and coronary arteries.  There is no mediastinal lymphadenopathy.  There are enlarged left hilar nodes measuring 2.3 by 1.3 cm.  There are borderline enlarged right hilar nodes.  Densely calcified nodes are present in the left hilum.  No acute findings are present in the upper abdomen.  Degenerative changes are present in the dorsal spine without definite bone destruction.  There are postoperative changes of left shoulder arthroplasty.  The aorta is of normal caliber.  There is significant motion degradation in the lung bases.  There are no obvious filling defects within the main or proximal pulmonary arteries.        1. Significant motion artifact is present in the bases.  No central pulmonary emboli are identified.  2. Extensive consolidation in both lower lobes raising the question of aspiration. 3. Advanced emphysematous lung disease 4. Extensive coronary artery atherosclerotic calcifications.  5. Left hilar lymphadenopathy with a 2.3 cm node.  There are densely calcified nodes in the left hilum as well.  Smaller nodes are present within the right hilum.  There is no mediastinal lymphadenopathy. 6. Postop changes of recent left shoulder arthroplasty.     Jordan Marrero MD       Electronically Signed and Approved By: Jordan Marrero MD on 2023 at 7:46             XR Chest 1 View    Result Date: 2023  PROCEDURE: XR CHEST 1 VW  COMPARISON: [facility], , CHEST AP/PA 1 VIEW, 2013, 16:00.  INDICATIONS: Dysrhythmia  FINDINGS:  Infiltrates are noted in the lung bases.  The cardiac silhouette and mediastinum are stable.  Aortic atherosclerosis is noted.  No acute osseous abnormalities are observed.        1. Bibasilar infiltrates are noted.  The findings may relate to bibasilar pneumonia.  Changes of CHF and pulmonary edema could also have this appearance.       ANAYA FELDMAN MD       Electronically Signed and Approved By: ANAYA FELDMAN MD on 2023 at 6:46             XR Shoulder 1 Vw LT    Result Date: 4/10/2023  REVIEWING YOUR TEST RESULTS IN [facility] IS NOT A SUBSTITUTE FOR DISCUSSING THOSE RESULTS WITH YOUR HEALTH CARE PROVIDER. PLEASE CONTACT YOUR PROVIDER [facility]IA [facility] TO DISCUSS ANY QUESTIONS OR CONCERNS YOU MAY HAVE REGARDING THESE TEST RESULTS.  RADIOLOGY REPORT FACILITY:  [facility] UNIT/AGE/GENDER: J.PERIOP  OP      AGE:82 Y          SEX:M PATIENT NAME/:  DAPHNE BUSBY E    1940 UNIT NUMBER:  [id number] ACCOUNT NUMBER:  [id number] ACCESSION NUMBER:  [id number] PORTABLE AP LEFT SHOULDER 1328 hours DATE: 04/10/2023 COMPARISON: 2023 INDICATION: Status post shoulder joint replacement. IMPRESSION: Patient is status post reverse total left shoulder arthroplasty. Prosthesis is in expected position and alignment in this single view. Negative for periprosthetic fracture. Surgical drain in place. Dictated by: Marlo Winters,  M.D. Images and Report reviewed and interpreted by: Marlo Winters M.D. <PS><Electronically signed by: Marlo Winters M.D.> 04/10/2023 1349 D: 04/10/2023 1348 T: 04/10/2023 1348      [x]  EKG/Telemetry   []  Cardiology/Vascular   []  Pathology  [x]  Old records  []  Other:    Assessment & Plan   Assessment / Plan     Assessment:  Atrial fibrillation rapid ventricular response  Multifocal atrial tachycardia  Multifocal pneumonia with concern for pneumococcal infection  Acute hypoxemic respiratory failure  Diabetic ketoacidosis  JUNIOR  Anion gap metabolic acidosis  Thyrotoxicosis/hyperthyroidism  Hypophosphatemia  Hypokalemia  Acute metabolic encephalopathy multifactorial likely related to sepsis  COPD  Dyslipidemia  Chronic diastolic CHF   Aortic valve stenosis     Plan:  Labs and imaging reviewed  X-ray right ankle personally reviewed, no fractures or acute displacement noted  Continue telemetry monitoring while we titrate medications including Cardizem and metoprolol  Continue methimazole 10 mg 3 times daily  We will refer to endocrinology at discharge given history of thyrotoxicosis  Continue PT/OT  Continue to wean oxygen per tolerance  Continue with Levemir to 20 units twice daily, correctional insulin in place  Continue Plavix 75 mg daily  Cardiology recommendations appreciated  Discussed with critical care Dr. Boles, recommendations appreciated  Continue ceftriaxone 1 g daily to complete 7 days total  Repeat TSH and free T4 in a.m.  Tamsulosin 0.4 mg daily continued  CBC, CMP, magnesium, phosphorus reviewed, recheck in follow-up in a.m.  Full code  DVT prophylaxis with Lovenox  Clinical course dictate further management   Discussed with nurse at the bedside      DVT prophylaxis:  Medical and mechanical DVT prophylaxis orders are present.    CODE STATUS:   Level Of Support Discussed With: Patient  Code Status (Patient has no pulse and is not breathing): CPR (Attempt to Resuscitate)  Medical Interventions  (Patient has pulse or is breathing): Full Support  Release to patient: Routine Release      Electronically signed by Marin Chauhan DO, 04/26/23, 8:43 AM EDT.

## 2023-04-26 NOTE — PLAN OF CARE
Problem: Adult Inpatient Plan of Care  Goal: Plan of Care Review  Outcome: Met  Flowsheets (Taken 4/26/2023 1441)  Plan of Care Reviewed With:   patient   spouse  Outcome Evaluation: No significant changes. Currently on room air. Blood sugar monitored and sliding scale insulin administered per MAR. Patient is being discharged to [facility].   Goal Outcome Evaluation:  Plan of Care Reviewed With: patient, spouse           Outcome Evaluation: No significant changes. Currently on room air. Blood sugar monitored and sliding scale insulin administered per MAR. Patient is being discharged to [facility].

## 2023-04-26 NOTE — PLAN OF CARE
Goal Outcome Evaluation:  Plan of Care Reviewed With: patient        Progress: improving  Outcome Evaluation: No acute distress this shift, pt was asking for a bath and to be shaved. Care provided. VSS, remains on 3L NC. Continue with POC.

## 2023-04-26 NOTE — OUTREACH NOTE
Prep Survey    Flowsheet Row Responses   Sabianism facility patient discharged from? [facility]   Is LACE score < 7 ? No   Eligibility Not Eligible   What are the reasons patient is not eligible? [facility]   Does the patient have one of the following disease processes/diagnoses(primary or secondary)? Other   Prep survey completed? Yes          Brianna PEREZ - Registered Nurse

## 2023-04-30 LAB — QT INTERVAL: 301 MS

## 2023-06-02 ENCOUNTER — TRANSCRIBE ORDERS (OUTPATIENT)
Dept: ADMINISTRATIVE | Facility: HOSPITAL | Age: 83
End: 2023-06-02
Payer: MEDICARE

## 2023-06-02 ENCOUNTER — HOSPITAL ENCOUNTER (OUTPATIENT)
Dept: GENERAL RADIOLOGY | Facility: HOSPITAL | Age: 83
Discharge: HOME | End: 2023-06-02

## 2023-06-02 DIAGNOSIS — J18.9 PNEUMONIA OF BOTH LUNGS DUE TO INFECTIOUS ORGANISM, UNSPECIFIED PART OF LUNG: ICD-10-CM

## 2023-06-02 DIAGNOSIS — J18.9 PNEUMONIA OF BOTH LUNGS DUE TO INFECTIOUS ORGANISM, UNSPECIFIED PART OF LUNG: Primary | ICD-10-CM

## 2023-06-02 PROCEDURE — 71046 X-RAY EXAM CHEST 2 VIEWS: CPT

## 2023-08-10 NOTE — PROGRESS NOTES
Reason for consultation critical care management    Patient critically ill in the intensive care unit  Heart rate better  Mental status improved yes  Much more alert today  Still with difficulty swallowing        Vitals:    04/21/23 0800 04/21/23 0900 04/21/23 1000 04/21/23 1212   BP:   126/63    BP Location:       Patient Position:       Pulse: 92 91 89 89   Resp:    18   Temp:       TempSrc:       SpO2: 94% 95% 96% 96%   Weight:       Height:           Physical Exam  Acutely ill male on nasal cannula  Mental status much improved  Heart rate much improved  Abdomen is soft      Brief Patient Summary:  Cristi Maloney is a 82 y.o. male who critically ill in the intensive care unit with respiratory failure due to multifocal pneumonia, sepsis and rapid A-fib with DKA     Active Hospital Problems:       Active Hospital Problems     Diagnosis     • **Sepsis     Rapid atrial fibrillation with RVR  Multifocal pneumonia concern for pneumococcal pneumonia  Acute hypoxic respiratory failure  Diabetic ketoacidosis  Acute kidney injury likely prerenal  Anion gap metabolic acidosis  Primary hyperhyroidism/thyrotoxicosis  Hypophosphatemia  Hypokalemia  Altered mental status likely metabolic encephalopathy from sepsis  COPD with centrilobular emphysema  Plan:   Transition to p.o. diltiazem  We will plan on removing Henry catheter  Removed triple-lumen catheter  PT OT consult  Transition off insulin drip  Start patient on Levemir and sliding scale insulin  Patient has given a 5 mg IV bolus of metoprolol and then 10 mg of diltiazem with improvement heart rate  If patient is able to swallow today we will start Cardizem 15 every 6 hours cardiology recommendation however at this time speech is recommending n.p.o. still for at least another day  Continue antibiotics for 5 days  Continue methimazole  Repeat T4       Patient is critically ill in ICU with  sepsis, rapid atrial fibrillation, multifocal pneumonia, acute hypoxic respiratory  failure, acute kidney injury, thyrotoxicosis  I spent 32 minutes of critical care time excluding any procedure notes, spent in review, analysis, obtaining history and physical, formulating care plan, and I led multi-disciplinary critical care rounds with bedside nurse, respiratory therapist, clinical pharmacist and other allied services. I have discussed the case with primary service and other consultants as well.    Electronically signed by Maurice Meier DO, 04/21/23, 12:44 PM EDT.     Zygomaticofacial Flap Text: Given the location of the defect, shape of the defect and the proximity to free margins a zygomaticofacial flap was deemed most appropriate for repair. Using a sterile surgical marker, the appropriate flap was drawn incorporating the defect and placing the expected incisions within the relaxed skin tension lines where possible. The area thus outlined was incised deep to adipose tissue with a #15 scalpel blade with preservation of a vascular pedicle.  The skin margins were undermined to an appropriate distance in all directions utilizing iris scissors. The flap was then carried over into the defect and anchored with interrupted buried subcutaneous sutures.

## 2025-05-30 NOTE — PROGRESS NOTES
"[facility] Clinical Pharmacy Services: Vancomycin Pharmacokinetic Initial Consult Note    Cristi Maloney is a 82 y.o. male who is on day 1 of pharmacy to dose vancomycin for Sepsis.    Consult Information  Consulting Provider: Susan  Planned Duration of Therapy: 7 days  Was Patient Receiving Prior to Admission/Consult?: No  Loading Dose Ordered or Given: 1500 mg on 4/ at 1500  PK/PD Target: -600 mg/L.hr   Relevant ID History:   Other Antimicrobials: Cefepime    Imaging Reviewed?: Yes    Microbiology Data  MRSA PCR performed: 23; Result: Pending  Culture/Source:    S.pneumo AG +     Vitals/Labs  Ht: 177.8 cm (70\"); Wt: 80.4 kg (177 lb 4 oz)  Temp (24hrs), Av.9 °F (37.2 °C), Min:97.8 °F (36.6 °C), Max:100 °F (37.8 °C)   Estimated Creatinine Clearance: 59.4 mL/min (by C-G formula based on SCr of 1.11 mg/dL).       Results from last 7 days   Lab Units 23  1300 23  1114 23  1010 23  0630   CREATININE mg/dL 1.11 1.21  --  1.45*   WBC 10*3/mm3  --   --  16.05* 17.84*     Assessment/Plan:    Vancomycin Dose:  1250 mg IV every 24 hours to start tomorrow following vancomycin 1500mg IV loading dose given today ; which provides the following predicted parameters:  AUC24,ss: 461 mg/L.hr  PAUC*: 67 %  Ctrough,ss: 13.7 mg/L  Pconc*: 16 %  Tox.: 9 %  Vanc Random ordered for  at 0600  Patient has order for Basic Metabolic Panel    Pharmacy will follow patient's kidney function and will adjust doses and obtain levels as necessary. Thank you for involving pharmacy in this patient's care. Please contact pharmacy with any questions or concerns.                           Courtney Santiago [facility]  Clinical Pharmacist    " [facility] for Tobacco Control website --- [email or web address] --- [email or web address]